# Patient Record
Sex: FEMALE | Race: WHITE | NOT HISPANIC OR LATINO | Employment: OTHER | URBAN - METROPOLITAN AREA
[De-identification: names, ages, dates, MRNs, and addresses within clinical notes are randomized per-mention and may not be internally consistent; named-entity substitution may affect disease eponyms.]

---

## 2017-07-17 ENCOUNTER — APPOINTMENT (EMERGENCY)
Dept: CT IMAGING | Facility: HOSPITAL | Age: 62
End: 2017-07-17
Payer: COMMERCIAL

## 2017-07-17 ENCOUNTER — APPOINTMENT (EMERGENCY)
Dept: RADIOLOGY | Facility: HOSPITAL | Age: 62
End: 2017-07-17
Payer: COMMERCIAL

## 2017-07-17 ENCOUNTER — HOSPITAL ENCOUNTER (EMERGENCY)
Facility: HOSPITAL | Age: 62
Discharge: HOME | End: 2017-07-18
Attending: EMERGENCY MEDICINE | Admitting: EMERGENCY MEDICINE
Payer: COMMERCIAL

## 2017-07-17 DIAGNOSIS — M62.830 MUSCLE SPASM OF BACK: ICD-10-CM
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
## 2017-07-17 LAB
ANION GAP SERPL CALCULATED.3IONS-SCNC: 6 MMOL/L (ref 4–13)
BUN SERPL-MCNC: 11 MG/DL (ref 5–25)
CALCIUM SERPL-MCNC: 10.2 MG/DL (ref 8.3–10.1)
CHLORIDE SERPL-SCNC: 100 MMOL/L (ref 100–108)
CO2 SERPL-SCNC: 28 MMOL/L (ref 21–32)
CREAT SERPL-MCNC: 0.57 MG/DL (ref 0.6–1.3)
GFR SERPL CREATININE-BSD FRML MDRD: >60 ML/MIN/1.73SQ M
GLUCOSE SERPL-MCNC: 109 MG/DL (ref 65–140)
POTASSIUM SERPL-SCNC: 3.4 MMOL/L (ref 3.5–5.3)
SODIUM SERPL-SCNC: 134 MMOL/L (ref 136–145)

## 2017-07-17 PROCEDURE — 72072 X-RAY EXAM THORAC SPINE 3VWS: CPT

## 2017-07-17 PROCEDURE — 70496 CT ANGIOGRAPHY HEAD: CPT

## 2017-07-17 PROCEDURE — 96375 TX/PRO/DX INJ NEW DRUG ADDON: CPT

## 2017-07-17 PROCEDURE — 36415 COLL VENOUS BLD VENIPUNCTURE: CPT | Performed by: EMERGENCY MEDICINE

## 2017-07-17 PROCEDURE — 80048 BASIC METABOLIC PNL TOTAL CA: CPT | Performed by: EMERGENCY MEDICINE

## 2017-07-17 PROCEDURE — 70498 CT ANGIOGRAPHY NECK: CPT

## 2017-07-17 PROCEDURE — 96374 THER/PROPH/DIAG INJ IV PUSH: CPT

## 2017-07-17 PROCEDURE — 72100 X-RAY EXAM L-S SPINE 2/3 VWS: CPT

## 2017-07-17 RX ORDER — ONDANSETRON 2 MG/ML
4 INJECTION INTRAMUSCULAR; INTRAVENOUS ONCE
Status: COMPLETED | OUTPATIENT
Start: 2017-07-17 | End: 2017-07-17

## 2017-07-17 RX ORDER — MORPHINE SULFATE 4 MG/ML
4 INJECTION, SOLUTION INTRAMUSCULAR; INTRAVENOUS ONCE
Status: COMPLETED | OUTPATIENT
Start: 2017-07-17 | End: 2017-07-17

## 2017-07-17 RX ORDER — GABAPENTIN 100 MG/1
100 CAPSULE ORAL DAILY
COMMUNITY
End: 2018-07-05 | Stop reason: SDUPTHER

## 2017-07-17 RX ORDER — LOSARTAN POTASSIUM 100 MG/1
100 TABLET ORAL DAILY
COMMUNITY
End: 2018-07-05 | Stop reason: SDUPTHER

## 2017-07-17 RX ADMIN — IOHEXOL 85 ML: 350 INJECTION, SOLUTION INTRAVENOUS at 22:28

## 2017-07-17 RX ADMIN — ONDANSETRON 4 MG: 2 INJECTION INTRAMUSCULAR; INTRAVENOUS at 21:45

## 2017-07-17 RX ADMIN — MORPHINE SULFATE 4 MG: 4 INJECTION, SOLUTION INTRAMUSCULAR; INTRAVENOUS at 21:47

## 2017-07-18 VITALS
OXYGEN SATURATION: 99 % | TEMPERATURE: 99 F | BODY MASS INDEX: 27.89 KG/M2 | WEIGHT: 188.27 LBS | HEART RATE: 78 BPM | DIASTOLIC BLOOD PRESSURE: 67 MMHG | HEIGHT: 69 IN | SYSTOLIC BLOOD PRESSURE: 146 MMHG | RESPIRATION RATE: 19 BRPM

## 2017-07-18 PROCEDURE — 99284 EMERGENCY DEPT VISIT MOD MDM: CPT

## 2017-07-18 RX ORDER — DIAZEPAM 5 MG/1
5 TABLET ORAL ONCE
Status: COMPLETED | OUTPATIENT
Start: 2017-07-18 | End: 2017-07-18

## 2017-07-18 RX ORDER — DIAZEPAM 5 MG/1
5 TABLET ORAL EVERY 8 HOURS PRN
Qty: 10 TABLET | Refills: 0 | Status: SHIPPED | OUTPATIENT
Start: 2017-07-18 | End: 2018-07-05

## 2017-07-18 RX ORDER — NAPROXEN 500 MG/1
500 TABLET ORAL 2 TIMES DAILY WITH MEALS
Qty: 15 TABLET | Refills: 0 | Status: SHIPPED | OUTPATIENT
Start: 2017-07-18 | End: 2018-07-05

## 2017-07-18 RX ADMIN — DIAZEPAM 5 MG: 5 TABLET ORAL at 00:54

## 2018-07-05 ENCOUNTER — OFFICE VISIT (OUTPATIENT)
Dept: INTERNAL MEDICINE CLINIC | Facility: CLINIC | Age: 63
End: 2018-07-05
Payer: COMMERCIAL

## 2018-07-05 VITALS
HEIGHT: 65 IN | BODY MASS INDEX: 31.59 KG/M2 | WEIGHT: 189.6 LBS | DIASTOLIC BLOOD PRESSURE: 90 MMHG | SYSTOLIC BLOOD PRESSURE: 150 MMHG | HEART RATE: 80 BPM | RESPIRATION RATE: 12 BRPM

## 2018-07-05 DIAGNOSIS — M79.10 MYALGIA: ICD-10-CM

## 2018-07-05 DIAGNOSIS — R25.2 MUSCLE CRAMPS: ICD-10-CM

## 2018-07-05 DIAGNOSIS — E66.9 OBESITY (BMI 30-39.9): ICD-10-CM

## 2018-07-05 DIAGNOSIS — E78.2 MIXED HYPERLIPIDEMIA: ICD-10-CM

## 2018-07-05 DIAGNOSIS — I10 ESSENTIAL HYPERTENSION: Primary | ICD-10-CM

## 2018-07-05 DIAGNOSIS — R60.0 BILATERAL EDEMA OF LOWER EXTREMITY: ICD-10-CM

## 2018-07-05 PROBLEM — M17.2 BILATERAL POST-TRAUMATIC OSTEOARTHRITIS OF KNEE: Status: ACTIVE | Noted: 2018-07-05

## 2018-07-05 PROCEDURE — 99204 OFFICE O/P NEW MOD 45 MIN: CPT | Performed by: INTERNAL MEDICINE

## 2018-07-05 PROCEDURE — 1036F TOBACCO NON-USER: CPT | Performed by: INTERNAL MEDICINE

## 2018-07-05 PROCEDURE — 3008F BODY MASS INDEX DOCD: CPT | Performed by: INTERNAL MEDICINE

## 2018-07-05 RX ORDER — FUROSEMIDE 20 MG/1
20 TABLET ORAL DAILY
Qty: 90 TABLET | Refills: 3 | Status: SHIPPED | OUTPATIENT
Start: 2018-07-05 | End: 2019-01-17 | Stop reason: SDUPTHER

## 2018-07-05 RX ORDER — FUROSEMIDE 20 MG/1
20 TABLET ORAL DAILY
COMMUNITY
End: 2018-07-05 | Stop reason: SDUPTHER

## 2018-07-05 RX ORDER — LOSARTAN POTASSIUM 100 MG/1
100 TABLET ORAL DAILY
Qty: 90 TABLET | Refills: 3 | Status: SHIPPED | OUTPATIENT
Start: 2018-07-05 | End: 2019-01-17 | Stop reason: SDUPTHER

## 2018-07-05 RX ORDER — GABAPENTIN 100 MG/1
100 CAPSULE ORAL DAILY
Qty: 90 CAPSULE | Refills: 3 | Status: SHIPPED | OUTPATIENT
Start: 2018-07-05 | End: 2019-01-17

## 2018-07-05 RX ORDER — ROSUVASTATIN CALCIUM 20 MG/1
20 TABLET, COATED ORAL DAILY
Qty: 90 TABLET | Refills: 3 | Status: SHIPPED | OUTPATIENT
Start: 2018-07-05 | End: 2019-01-17 | Stop reason: SDUPTHER

## 2018-07-05 RX ORDER — ROSUVASTATIN CALCIUM 20 MG/1
20 TABLET, COATED ORAL DAILY
COMMUNITY
End: 2018-07-05 | Stop reason: SDUPTHER
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
## 2018-07-09 ENCOUNTER — APPOINTMENT (OUTPATIENT)
Dept: LAB | Facility: CLINIC | Age: 63
End: 2018-07-09
Payer: COMMERCIAL

## 2018-07-09 DIAGNOSIS — M79.10 MYALGIA: ICD-10-CM

## 2018-07-09 DIAGNOSIS — E78.2 MIXED HYPERLIPIDEMIA: ICD-10-CM

## 2018-07-09 DIAGNOSIS — I10 ESSENTIAL HYPERTENSION: ICD-10-CM

## 2018-07-09 LAB
25(OH)D3 SERPL-MCNC: 27.4 NG/ML (ref 30–100)
ALBUMIN SERPL BCP-MCNC: 3.7 G/DL (ref 3.5–5)
ALP SERPL-CCNC: 77 U/L (ref 46–116)
ALT SERPL W P-5'-P-CCNC: 31 U/L (ref 12–78)
ANION GAP SERPL CALCULATED.3IONS-SCNC: 6 MMOL/L (ref 4–13)
AST SERPL W P-5'-P-CCNC: 19 U/L (ref 5–45)
BASOPHILS # BLD AUTO: 0.06 THOUSANDS/ΜL (ref 0–0.1)
BASOPHILS NFR BLD AUTO: 1 % (ref 0–1)
BILIRUB SERPL-MCNC: 0.58 MG/DL (ref 0.2–1)
BUN SERPL-MCNC: 18 MG/DL (ref 5–25)
CALCIUM SERPL-MCNC: 9.4 MG/DL (ref 8.3–10.1)
CHLORIDE SERPL-SCNC: 105 MMOL/L (ref 100–108)
CHOLEST SERPL-MCNC: 172 MG/DL (ref 50–200)
CO2 SERPL-SCNC: 27 MMOL/L (ref 21–32)
CREAT SERPL-MCNC: 0.69 MG/DL (ref 0.6–1.3)
EOSINOPHIL # BLD AUTO: 0.19 THOUSAND/ΜL (ref 0–0.61)
EOSINOPHIL NFR BLD AUTO: 4 % (ref 0–6)
ERYTHROCYTE [DISTWIDTH] IN BLOOD BY AUTOMATED COUNT: 12.8 % (ref 11.6–15.1)
GFR SERPL CREATININE-BSD FRML MDRD: 94 ML/MIN/1.73SQ M
GLUCOSE P FAST SERPL-MCNC: 87 MG/DL (ref 65–99)
HCT VFR BLD AUTO: 39 % (ref 34.8–46.1)
HDLC SERPL-MCNC: 52 MG/DL (ref 40–60)
HGB BLD-MCNC: 12.6 G/DL (ref 11.5–15.4)
IMM GRANULOCYTES # BLD AUTO: 0.01 THOUSAND/UL (ref 0–0.2)
IMM GRANULOCYTES NFR BLD AUTO: 0 % (ref 0–2)
LDLC SERPL CALC-MCNC: 94 MG/DL (ref 0–100)
LYMPHOCYTES # BLD AUTO: 1.58 THOUSANDS/ΜL (ref 0.6–4.47)
LYMPHOCYTES NFR BLD AUTO: 32 % (ref 14–44)
MCH RBC QN AUTO: 28.8 PG (ref 26.8–34.3)
MCHC RBC AUTO-ENTMCNC: 32.3 G/DL (ref 31.4–37.4)
MCV RBC AUTO: 89 FL (ref 82–98)
MONOCYTES # BLD AUTO: 0.52 THOUSAND/ΜL (ref 0.17–1.22)
MONOCYTES NFR BLD AUTO: 11 % (ref 4–12)
NEUTROPHILS # BLD AUTO: 2.6 THOUSANDS/ΜL (ref 1.85–7.62)
NEUTS SEG NFR BLD AUTO: 52 % (ref 43–75)
NONHDLC SERPL-MCNC: 120 MG/DL
NRBC BLD AUTO-RTO: 0 /100 WBCS
PLATELET # BLD AUTO: 262 THOUSANDS/UL (ref 149–390)
PMV BLD AUTO: 10.8 FL (ref 8.9–12.7)
POTASSIUM SERPL-SCNC: 4 MMOL/L (ref 3.5–5.3)
PROT SERPL-MCNC: 7.4 G/DL (ref 6.4–8.2)
RBC # BLD AUTO: 4.37 MILLION/UL (ref 3.81–5.12)
SODIUM SERPL-SCNC: 138 MMOL/L (ref 136–145)
TRIGL SERPL-MCNC: 131 MG/DL
TSH SERPL DL<=0.05 MIU/L-ACNC: 2.25 UIU/ML (ref 0.36–3.74)
WBC # BLD AUTO: 4.96 THOUSAND/UL (ref 4.31–10.16)

## 2018-07-09 PROCEDURE — 36415 COLL VENOUS BLD VENIPUNCTURE: CPT

## 2018-07-09 PROCEDURE — 84443 ASSAY THYROID STIM HORMONE: CPT

## 2018-07-09 PROCEDURE — 80061 LIPID PANEL: CPT

## 2018-07-09 PROCEDURE — 82306 VITAMIN D 25 HYDROXY: CPT

## 2018-07-09 PROCEDURE — 80053 COMPREHEN METABOLIC PANEL: CPT

## 2018-07-09 PROCEDURE — 85025 COMPLETE CBC W/AUTO DIFF WBC: CPT

## 2018-07-23 ENCOUNTER — TELEPHONE (OUTPATIENT)
Dept: INTERNAL MEDICINE CLINIC | Facility: CLINIC | Age: 63
End: 2018-07-23

## 2018-07-23 NOTE — TELEPHONE ENCOUNTER
Patient calling about her lab work done on Monday July 9- the results- results in chart    Informed her that Dr Nasreen Clark was out of the office for 2 wks      Please call with results      P#: [phone]

## 2018-11-29 ENCOUNTER — TELEPHONE (OUTPATIENT)
Dept: INTERNAL MEDICINE CLINIC | Facility: CLINIC | Age: 63
End: 2018-11-29

## 2019-01-04 ENCOUNTER — APPOINTMENT (OUTPATIENT)
Dept: LAB | Facility: CLINIC | Age: 64
End: 2019-01-04
Payer: COMMERCIAL

## 2019-01-04 DIAGNOSIS — E78.2 MIXED HYPERLIPIDEMIA: ICD-10-CM

## 2019-01-04 DIAGNOSIS — I10 ESSENTIAL HYPERTENSION: ICD-10-CM

## 2019-01-04 LAB
ALBUMIN SERPL BCP-MCNC: 3.4 G/DL (ref 3.5–5)
ALP SERPL-CCNC: 73 U/L (ref 46–116)
ALT SERPL W P-5'-P-CCNC: 35 U/L (ref 12–78)
ANION GAP SERPL CALCULATED.3IONS-SCNC: 5 MMOL/L (ref 4–13)
AST SERPL W P-5'-P-CCNC: 19 U/L (ref 5–45)
BASOPHILS # BLD AUTO: 0.06 THOUSANDS/ΜL (ref 0–0.1)
BASOPHILS NFR BLD AUTO: 1 % (ref 0–1)
BILIRUB SERPL-MCNC: 0.4 MG/DL (ref 0.2–1)
BUN SERPL-MCNC: 16 MG/DL (ref 5–25)
CALCIUM SERPL-MCNC: 9.9 MG/DL (ref 8.3–10.1)
CHLORIDE SERPL-SCNC: 107 MMOL/L (ref 100–108)
CHOLEST SERPL-MCNC: 155 MG/DL (ref 50–200)
CO2 SERPL-SCNC: 26 MMOL/L (ref 21–32)
CREAT SERPL-MCNC: 0.67 MG/DL (ref 0.6–1.3)
EOSINOPHIL # BLD AUTO: 0.15 THOUSAND/ΜL (ref 0–0.61)
EOSINOPHIL NFR BLD AUTO: 3 % (ref 0–6)
ERYTHROCYTE [DISTWIDTH] IN BLOOD BY AUTOMATED COUNT: 12.9 % (ref 11.6–15.1)
GFR SERPL CREATININE-BSD FRML MDRD: 95 ML/MIN/1.73SQ M
GLUCOSE P FAST SERPL-MCNC: 88 MG/DL (ref 65–99)
HCT VFR BLD AUTO: 40.2 % (ref 34.8–46.1)
HDLC SERPL-MCNC: 57 MG/DL (ref 40–60)
HGB BLD-MCNC: 13.3 G/DL (ref 11.5–15.4)
IMM GRANULOCYTES # BLD AUTO: 0.01 THOUSAND/UL (ref 0–0.2)
IMM GRANULOCYTES NFR BLD AUTO: 0 % (ref 0–2)
LDLC SERPL CALC-MCNC: 69 MG/DL (ref 0–100)
LYMPHOCYTES # BLD AUTO: 1.31 THOUSANDS/ΜL (ref 0.6–4.47)
LYMPHOCYTES NFR BLD AUTO: 28 % (ref 14–44)
MCH RBC QN AUTO: 29.3 PG (ref 26.8–34.3)
MCHC RBC AUTO-ENTMCNC: 33.1 G/DL (ref 31.4–37.4)
MCV RBC AUTO: 89 FL (ref 82–98)
MONOCYTES # BLD AUTO: 0.48 THOUSAND/ΜL (ref 0.17–1.22)
MONOCYTES NFR BLD AUTO: 10 % (ref 4–12)
NEUTROPHILS # BLD AUTO: 2.75 THOUSANDS/ΜL (ref 1.85–7.62)
NEUTS SEG NFR BLD AUTO: 58 % (ref 43–75)
NONHDLC SERPL-MCNC: 98 MG/DL
NRBC BLD AUTO-RTO: 0 /100 WBCS
PLATELET # BLD AUTO: 253 THOUSANDS/UL (ref 149–390)
PMV BLD AUTO: 11.5 FL (ref 8.9–12.7)
POTASSIUM SERPL-SCNC: 4 MMOL/L (ref 3.5–5.3)
PROT SERPL-MCNC: 7.3 G/DL (ref 6.4–8.2)
RBC # BLD AUTO: 4.54 MILLION/UL (ref 3.81–5.12)
SODIUM SERPL-SCNC: 138 MMOL/L (ref 136–145)
TRIGL SERPL-MCNC: 145 MG/DL
TSH SERPL DL<=0.05 MIU/L-ACNC: 1.87 UIU/ML (ref 0.36–3.74)
WBC # BLD AUTO: 4.76 THOUSAND/UL (ref 4.31–10.16)

## 2019-01-04 PROCEDURE — 80061 LIPID PANEL: CPT

## 2019-01-04 PROCEDURE — 85025 COMPLETE CBC W/AUTO DIFF WBC: CPT

## 2019-01-04 PROCEDURE — 36415 COLL VENOUS BLD VENIPUNCTURE: CPT

## 2019-01-04 PROCEDURE — 84443 ASSAY THYROID STIM HORMONE: CPT

## 2019-01-04 PROCEDURE — 80053 COMPREHEN METABOLIC PANEL: CPT

## 2019-01-17 ENCOUNTER — OFFICE VISIT (OUTPATIENT)
Dept: INTERNAL MEDICINE CLINIC | Facility: CLINIC | Age: 64
End: 2019-01-17
Payer: COMMERCIAL

## 2019-01-17 VITALS
SYSTOLIC BLOOD PRESSURE: 160 MMHG | HEART RATE: 66 BPM | OXYGEN SATURATION: 96 % | DIASTOLIC BLOOD PRESSURE: 96 MMHG | HEIGHT: 66 IN | WEIGHT: 195.4 LBS | BODY MASS INDEX: 31.4 KG/M2

## 2019-01-17 DIAGNOSIS — G60.9 IDIOPATHIC PERIPHERAL NEUROPATHY: ICD-10-CM

## 2019-01-17 DIAGNOSIS — Z00.00 HEALTHCARE MAINTENANCE: ICD-10-CM

## 2019-01-17 DIAGNOSIS — E66.9 OBESITY (BMI 30-39.9): ICD-10-CM

## 2019-01-17 DIAGNOSIS — Z12.39 SCREENING FOR MALIGNANT NEOPLASM OF BREAST: ICD-10-CM

## 2019-01-17 DIAGNOSIS — J01.01 ACUTE RECURRENT MAXILLARY SINUSITIS: ICD-10-CM

## 2019-01-17 DIAGNOSIS — Z00.00 MEDICARE ANNUAL WELLNESS VISIT, SUBSEQUENT: ICD-10-CM

## 2019-01-17 DIAGNOSIS — I10 ESSENTIAL HYPERTENSION: Primary | ICD-10-CM

## 2019-01-17 DIAGNOSIS — Z12.11 SCREEN FOR COLON CANCER: ICD-10-CM

## 2019-01-17 DIAGNOSIS — E78.2 MIXED HYPERLIPIDEMIA: ICD-10-CM

## 2019-01-17 DIAGNOSIS — R60.0 BILATERAL EDEMA OF LOWER EXTREMITY: ICD-10-CM

## 2019-01-17 PROCEDURE — 1036F TOBACCO NON-USER: CPT | Performed by: INTERNAL MEDICINE

## 2019-01-17 PROCEDURE — 3008F BODY MASS INDEX DOCD: CPT | Performed by: INTERNAL MEDICINE

## 2019-01-17 PROCEDURE — 99214 OFFICE O/P EST MOD 30 MIN: CPT | Performed by: INTERNAL MEDICINE

## 2019-01-17 RX ORDER — FUROSEMIDE 20 MG/1
20 TABLET ORAL DAILY
Qty: 90 TABLET | Refills: 1 | Status: SHIPPED | OUTPATIENT
Start: 2019-01-17 | End: 2019-07-03 | Stop reason: SDUPTHER

## 2019-01-17 RX ORDER — AMOXICILLIN 875 MG/1
875 TABLET, COATED ORAL 2 TIMES DAILY
Qty: 14 TABLET | Refills: 0 | Status: SHIPPED | OUTPATIENT
Start: 2019-01-17 | End: 2019-01-24

## 2019-01-17 RX ORDER — LOSARTAN POTASSIUM 100 MG/1
100 TABLET ORAL DAILY
Qty: 90 TABLET | Refills: 1 | Status: SHIPPED | OUTPATIENT
Start: 2019-01-17 | End: 2019-07-03 | Stop reason: SDUPTHER

## 2019-01-17 RX ORDER — ROSUVASTATIN CALCIUM 20 MG/1
20 TABLET, COATED ORAL DAILY
Qty: 90 TABLET | Refills: 1 | Status: SHIPPED | OUTPATIENT
Start: 2019-01-17 | End: 2019-07-03 | Stop reason: SDUPTHER

## 2019-01-17 RX ORDER — GABAPENTIN 600 MG/1
600 TABLET ORAL 2 TIMES DAILY
Qty: 180 TABLET | Refills: 1 | Status: SHIPPED | OUTPATIENT
Start: 2019-01-17 | End: 2020-03-19
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
## 2019-03-20 DIAGNOSIS — Z12.39 SCREENING FOR MALIGNANT NEOPLASM OF BREAST: ICD-10-CM

## 2019-05-14 ENCOUNTER — CONSULT (OUTPATIENT)
Dept: INTERNAL MEDICINE CLINIC | Facility: CLINIC | Age: 64
End: 2019-05-14
Payer: COMMERCIAL

## 2019-05-14 VITALS
HEIGHT: 66 IN | BODY MASS INDEX: 30.86 KG/M2 | DIASTOLIC BLOOD PRESSURE: 94 MMHG | SYSTOLIC BLOOD PRESSURE: 140 MMHG | OXYGEN SATURATION: 97 % | HEART RATE: 66 BPM | WEIGHT: 192 LBS

## 2019-05-14 DIAGNOSIS — Z01.818 PRE-OP EXAMINATION: Primary | ICD-10-CM

## 2019-05-14 DIAGNOSIS — E78.2 MIXED HYPERLIPIDEMIA: ICD-10-CM

## 2019-05-14 DIAGNOSIS — G60.9 IDIOPATHIC PERIPHERAL NEUROPATHY: ICD-10-CM

## 2019-05-14 DIAGNOSIS — I10 ESSENTIAL HYPERTENSION: ICD-10-CM

## 2019-05-14 DIAGNOSIS — H25.9 AGE-RELATED CATARACT OF BOTH EYES, UNSPECIFIED AGE-RELATED CATARACT TYPE: ICD-10-CM

## 2019-05-14 PROCEDURE — 99214 OFFICE O/P EST MOD 30 MIN: CPT | Performed by: INTERNAL MEDICINE

## 2019-06-19 ENCOUNTER — TELEPHONE (OUTPATIENT)
Dept: INTERNAL MEDICINE CLINIC | Facility: CLINIC | Age: 64
End: 2019-06-19

## 2019-06-20 ENCOUNTER — TELEPHONE (OUTPATIENT)
Dept: INTERNAL MEDICINE CLINIC | Facility: CLINIC | Age: 64
End: 2019-06-20

## 2019-06-21 DIAGNOSIS — R19.5 POSITIVE COLORECTAL CANCER SCREENING USING COLOGUARD TEST: Primary | ICD-10-CM

## 2019-06-24 ENCOUNTER — OFFICE VISIT (OUTPATIENT)
Dept: OBGYN CLINIC | Facility: CLINIC | Age: 64
End: 2019-06-24
Payer: COMMERCIAL

## 2019-06-24 VITALS
DIASTOLIC BLOOD PRESSURE: 92 MMHG | BODY MASS INDEX: 30.76 KG/M2 | HEIGHT: 66 IN | WEIGHT: 191.38 LBS | SYSTOLIC BLOOD PRESSURE: 140 MMHG

## 2019-06-24 DIAGNOSIS — Z01.419 ENCOUNTER FOR GYNECOLOGICAL EXAMINATION WITHOUT ABNORMAL FINDING: Primary | ICD-10-CM

## 2019-06-24 DIAGNOSIS — Z12.31 ENCOUNTER FOR SCREENING MAMMOGRAM FOR MALIGNANT NEOPLASM OF BREAST: ICD-10-CM

## 2019-06-24 PROCEDURE — G0145 SCR C/V CYTO,THINLAYER,RESCR: HCPCS | Performed by: OBSTETRICS & GYNECOLOGY

## 2019-06-24 PROCEDURE — S0610 ANNUAL GYNECOLOGICAL EXAMINA: HCPCS | Performed by: OBSTETRICS & GYNECOLOGY

## 2019-06-24 PROCEDURE — 87624 HPV HI-RISK TYP POOLED RSLT: CPT | Performed by: OBSTETRICS & GYNECOLOGY

## 2019-06-25 ENCOUNTER — APPOINTMENT (OUTPATIENT)
Dept: LAB | Facility: CLINIC | Age: 64
End: 2019-06-25
Payer: COMMERCIAL

## 2019-06-25 DIAGNOSIS — I10 ESSENTIAL HYPERTENSION: ICD-10-CM

## 2019-06-25 DIAGNOSIS — E78.2 MIXED HYPERLIPIDEMIA: ICD-10-CM

## 2019-06-25 LAB
ALBUMIN SERPL BCP-MCNC: 3.8 G/DL (ref 3.5–5)
ALP SERPL-CCNC: 74 U/L (ref 46–116)
ALT SERPL W P-5'-P-CCNC: 31 U/L (ref 12–78)
ANION GAP SERPL CALCULATED.3IONS-SCNC: 5 MMOL/L (ref 4–13)
AST SERPL W P-5'-P-CCNC: 21 U/L (ref 5–45)
BASOPHILS # BLD AUTO: 0.05 THOUSANDS/ΜL (ref 0–0.1)
BASOPHILS NFR BLD AUTO: 1 % (ref 0–1)
BILIRUB SERPL-MCNC: 0.72 MG/DL (ref 0.2–1)
BUN SERPL-MCNC: 23 MG/DL (ref 5–25)
CALCIUM ALBUM COR SERPL-MCNC: 10.5 MG/DL (ref 8.3–10.1)
CALCIUM SERPL-MCNC: 10.3 MG/DL (ref 8.3–10.1)
CHLORIDE SERPL-SCNC: 108 MMOL/L (ref 100–108)
CHOLEST SERPL-MCNC: 245 MG/DL (ref 50–200)
CO2 SERPL-SCNC: 26 MMOL/L (ref 21–32)
CREAT SERPL-MCNC: 0.74 MG/DL (ref 0.6–1.3)
EOSINOPHIL # BLD AUTO: 0.24 THOUSAND/ΜL (ref 0–0.61)
EOSINOPHIL NFR BLD AUTO: 5 % (ref 0–6)
ERYTHROCYTE [DISTWIDTH] IN BLOOD BY AUTOMATED COUNT: 12.9 % (ref 11.6–15.1)
GFR SERPL CREATININE-BSD FRML MDRD: 86 ML/MIN/1.73SQ M
GLUCOSE P FAST SERPL-MCNC: 82 MG/DL (ref 65–99)
HCT VFR BLD AUTO: 40.8 % (ref 34.8–46.1)
HDLC SERPL-MCNC: 57 MG/DL (ref 40–60)
HGB BLD-MCNC: 13.3 G/DL (ref 11.5–15.4)
IMM GRANULOCYTES # BLD AUTO: 0.01 THOUSAND/UL (ref 0–0.2)
IMM GRANULOCYTES NFR BLD AUTO: 0 % (ref 0–2)
LDLC SERPL CALC-MCNC: 165 MG/DL (ref 0–100)
LYMPHOCYTES # BLD AUTO: 1.92 THOUSANDS/ΜL (ref 0.6–4.47)
LYMPHOCYTES NFR BLD AUTO: 36 % (ref 14–44)
MCH RBC QN AUTO: 29.8 PG (ref 26.8–34.3)
MCHC RBC AUTO-ENTMCNC: 32.6 G/DL (ref 31.4–37.4)
MCV RBC AUTO: 92 FL (ref 82–98)
MONOCYTES # BLD AUTO: 0.56 THOUSAND/ΜL (ref 0.17–1.22)
MONOCYTES NFR BLD AUTO: 11 % (ref 4–12)
NEUTROPHILS # BLD AUTO: 2.56 THOUSANDS/ΜL (ref 1.85–7.62)
NEUTS SEG NFR BLD AUTO: 47 % (ref 43–75)
NONHDLC SERPL-MCNC: 188 MG/DL
NRBC BLD AUTO-RTO: 0 /100 WBCS
PLATELET # BLD AUTO: 289 THOUSANDS/UL (ref 149–390)
PMV BLD AUTO: 10.5 FL (ref 8.9–12.7)
POTASSIUM SERPL-SCNC: 4.2 MMOL/L (ref 3.5–5.3)
PROT SERPL-MCNC: 7.5 G/DL (ref 6.4–8.2)
RBC # BLD AUTO: 4.46 MILLION/UL (ref 3.81–5.12)
SODIUM SERPL-SCNC: 139 MMOL/L (ref 136–145)
TRIGL SERPL-MCNC: 117 MG/DL
TSH SERPL DL<=0.05 MIU/L-ACNC: 2.23 UIU/ML (ref 0.36–3.74)
WBC # BLD AUTO: 5.34 THOUSAND/UL (ref 4.31–10.16)

## 2019-06-25 PROCEDURE — 80053 COMPREHEN METABOLIC PANEL: CPT

## 2019-06-25 PROCEDURE — 84443 ASSAY THYROID STIM HORMONE: CPT

## 2019-06-25 PROCEDURE — 80061 LIPID PANEL: CPT

## 2019-06-25 PROCEDURE — 36415 COLL VENOUS BLD VENIPUNCTURE: CPT

## 2019-06-25 PROCEDURE — 85025 COMPLETE CBC W/AUTO DIFF WBC: CPT

## 2019-06-27 LAB
HPV HR 12 DNA CVX QL NAA+PROBE: NEGATIVE
HPV16 DNA CVX QL NAA+PROBE: NEGATIVE
HPV18 DNA CVX QL NAA+PROBE: NEGATIVE
LAB AP GYN PRIMARY INTERPRETATION: NORMAL
Lab: NORMAL

## 2019-06-28 ENCOUNTER — TELEPHONE (OUTPATIENT)
Dept: OBGYN CLINIC | Facility: CLINIC | Age: 64
End: 2019-06-28

## 2019-07-03 ENCOUNTER — OFFICE VISIT (OUTPATIENT)
Dept: INTERNAL MEDICINE CLINIC | Facility: CLINIC | Age: 64
End: 2019-07-03
Payer: COMMERCIAL

## 2019-07-03 VITALS
SYSTOLIC BLOOD PRESSURE: 130 MMHG | WEIGHT: 189.6 LBS | BODY MASS INDEX: 30.47 KG/M2 | OXYGEN SATURATION: 96 % | HEART RATE: 74 BPM | HEIGHT: 66 IN | DIASTOLIC BLOOD PRESSURE: 94 MMHG

## 2019-07-03 DIAGNOSIS — G60.9 IDIOPATHIC PERIPHERAL NEUROPATHY: ICD-10-CM

## 2019-07-03 DIAGNOSIS — I10 ESSENTIAL HYPERTENSION: Primary | ICD-10-CM

## 2019-07-03 DIAGNOSIS — R60.0 BILATERAL EDEMA OF LOWER EXTREMITY: ICD-10-CM

## 2019-07-03 DIAGNOSIS — E78.2 MIXED HYPERLIPIDEMIA: ICD-10-CM

## 2019-07-03 DIAGNOSIS — R19.5 POSITIVE COLORECTAL CANCER SCREENING USING COLOGUARD TEST: ICD-10-CM

## 2019-07-03 DIAGNOSIS — Z11.59 NEED FOR HEPATITIS C SCREENING TEST: ICD-10-CM

## 2019-07-03 PROCEDURE — 99214 OFFICE O/P EST MOD 30 MIN: CPT | Performed by: INTERNAL MEDICINE

## 2019-07-03 RX ORDER — FUROSEMIDE 20 MG/1
20 TABLET ORAL DAILY
Qty: 90 TABLET | Refills: 1 | Status: SHIPPED | OUTPATIENT
Start: 2019-07-03 | End: 2020-06-19 | Stop reason: SDUPTHER

## 2019-07-03 RX ORDER — ROSUVASTATIN CALCIUM 20 MG/1
20 TABLET, COATED ORAL DAILY
Qty: 90 TABLET | Refills: 1 | Status: SHIPPED | OUTPATIENT
Start: 2019-07-03 | End: 2020-06-19 | Stop reason: SDUPTHER

## 2019-07-03 RX ORDER — LOSARTAN POTASSIUM 100 MG/1
100 TABLET ORAL DAILY
Qty: 90 TABLET | Refills: 1 | Status: SHIPPED | OUTPATIENT
Start: 2019-07-03 | End: 2019-12-30 | Stop reason: SDUPTHER
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
## 2019-07-08 ENCOUNTER — OFFICE VISIT (OUTPATIENT)
Dept: INTERNAL MEDICINE CLINIC | Facility: CLINIC | Age: 64
End: 2019-07-08
Payer: COMMERCIAL

## 2019-07-08 VITALS
BODY MASS INDEX: 30.7 KG/M2 | HEIGHT: 66 IN | OXYGEN SATURATION: 98 % | TEMPERATURE: 98.1 F | HEART RATE: 67 BPM | SYSTOLIC BLOOD PRESSURE: 142 MMHG | DIASTOLIC BLOOD PRESSURE: 80 MMHG | WEIGHT: 191 LBS

## 2019-07-08 DIAGNOSIS — J06.9 UPPER RESPIRATORY TRACT INFECTION, UNSPECIFIED TYPE: Primary | ICD-10-CM

## 2019-07-08 PROCEDURE — 99213 OFFICE O/P EST LOW 20 MIN: CPT | Performed by: INTERNAL MEDICINE

## 2019-07-08 PROCEDURE — 3008F BODY MASS INDEX DOCD: CPT | Performed by: INTERNAL MEDICINE

## 2019-07-08 RX ORDER — AMOXICILLIN 875 MG/1
875 TABLET, COATED ORAL 2 TIMES DAILY
Qty: 14 TABLET | Refills: 0 | Status: SHIPPED | OUTPATIENT
Start: 2019-07-08 | End: 2019-07-15

## 2019-07-08 RX ORDER — FLUTICASONE PROPIONATE 50 MCG
2 SPRAY, SUSPENSION (ML) NASAL DAILY
Qty: 16 G | Refills: 3 | Status: SHIPPED | OUTPATIENT
Start: 2019-07-08 | End: 2020-01-13

## 2019-07-08 NOTE — PROGRESS NOTES
INTERNAL MEDICINE FOLLOW-UP OFFICE VISIT  [facility]    NAME: David Shabazz  AGE: 62 y o  SEX: female  : 1955   MRN: [id number]    DATE: 2019  TIME: 11:56 AM    Assessment and Plan     Diagnoses and all orders for this visit:    Upper respiratory tract infection, unspecified type  -     amoxicillin (AMOXIL) 875 mg tablet; Take 1 tablet (875 mg total) by mouth 2 (two) times a day for 7 days  -     fluticasone (FLONASE) 50 mcg/act nasal spray; 2 sprays into each nostril daily        - Counseling Documentation: patient was counseled regarding: instructions for management, risk factor reductions, prognosis, patient and family education, impressions, risks and benefits of treatment options and importance of compliance with treatment  - Medication Side Effects: Adverse side effects of medications were reviewed with the patient/guardian today  Return to office in: as needed    Chief Complaint     Chief Complaint   Patient presents with    Cold Like Symptoms     since saturday  History of Present Illness     URI    This is a new problem  The current episode started in the past 7 days  The problem has been gradually worsening  There has been no fever  Associated symptoms include congestion, a plugged ear sensation, rhinorrhea, sneezing and a sore throat  Pertinent negatives include no abdominal pain, chest pain, coughing, diarrhea, dysuria, ear pain, headaches, nausea, neck pain, rash, sinus pain, vomiting or wheezing  She has tried nothing for the symptoms  The following portions of the patient's history were reviewed and updated as appropriate: allergies, current medications, past family history, past medical history, past social history, past surgical history and problem list     Review of Systems     Review of Systems   Constitutional: Negative for chills, diaphoresis, fatigue and fever     HENT: Positive for congestion, rhinorrhea, sneezing and sore throat  Negative for ear discharge, ear pain, hearing loss, postnasal drip, sinus pressure, sinus pain and voice change  Eyes: Negative for pain, discharge, redness and visual disturbance  Respiratory: Negative for cough, chest tightness, shortness of breath and wheezing  Cardiovascular: Negative for chest pain, palpitations and leg swelling  Gastrointestinal: Negative for abdominal distention, abdominal pain, blood in stool, constipation, diarrhea, nausea and vomiting  Endocrine: Negative for cold intolerance, heat intolerance, polydipsia, polyphagia and polyuria  Genitourinary: Negative for dysuria, flank pain, frequency, hematuria and urgency  Musculoskeletal: Negative for arthralgias, back pain, gait problem, joint swelling, myalgias, neck pain and neck stiffness  Skin: Negative for rash  Neurological: Negative for dizziness, tremors, syncope, facial asymmetry, speech difficulty, weakness, light-headedness, numbness and headaches  Hematological: Does not bruise/bleed easily  Psychiatric/Behavioral: Negative for behavioral problems, confusion and sleep disturbance  The patient is not nervous/anxious  Active Problem List     Patient Active Problem List   Diagnosis    Essential hypertension    Mixed hyperlipidemia    Bilateral edema of lower extremity    Muscle cramps    Obesity (BMI 30-39  9)    Bilateral post-traumatic osteoarthritis of knee    Idiopathic peripheral neuropathy    Encounter for gynecological examination without abnormal finding       Objective     /80 (BP Location: Left arm, Patient Position: Sitting, Cuff Size: Standard)   Pulse 67   Temp 98 1 °F (36 7 °C) (Tympanic)   Ht 5' 5 5" (1 664 m)   Wt 86 6 kg (191 lb)   LMP  (LMP Unknown)   SpO2 98%   BMI 31 30 kg/m²     Physical Exam   Constitutional: She is oriented to person, place, and time  She appears well-developed and well-nourished  No distress  HENT:   Head: Normocephalic and atraumatic  Right Ear: External ear normal    Left Ear: External ear normal    Nose: Nose normal     Throat congested   Eyes: Conjunctivae and EOM are normal  Right eye exhibits no discharge  Left eye exhibits no discharge  No scleral icterus  Neck: Normal range of motion  Neck supple  No JVD present  No tracheal deviation present  No thyromegaly present  Cardiovascular: Normal rate, regular rhythm, normal heart sounds and intact distal pulses  Exam reveals no gallop and no friction rub  No murmur heard  Pulmonary/Chest: Effort normal and breath sounds normal  No respiratory distress  She has no wheezes  She has no rales  She exhibits no tenderness  Abdominal: Soft  Bowel sounds are normal  She exhibits no distension  There is no tenderness  There is no rebound and no guarding  Musculoskeletal: Normal range of motion  She exhibits no edema or tenderness  Lymphadenopathy:     She has no cervical adenopathy  Neurological: She is alert and oriented to person, place, and time  No cranial nerve deficit  She exhibits normal muscle tone  Coordination normal    Skin: Skin is warm and dry  No rash noted  She is not diaphoretic  No erythema  Psychiatric: She has a normal mood and affect   Judgment normal            Current Medications       Current Outpatient Medications:     B Complex Vitamins (B COMPLEX 1 PO), Take by mouth, Disp: , Rfl:     furosemide (LASIX) 20 mg tablet, Take 1 tablet (20 mg total) by mouth daily, Disp: 90 tablet, Rfl: 1    gabapentin (NEURONTIN) 600 MG tablet, Take 1 tablet (600 mg total) by mouth 2 (two) times a day, Disp: 180 tablet, Rfl: 1    losartan (COZAAR) 100 MG tablet, Take 1 tablet (100 mg total) by mouth daily, Disp: 90 tablet, Rfl: 1    rosuvastatin (CRESTOR) 20 MG tablet, Take 1 tablet (20 mg total) by mouth daily, Disp: 90 tablet, Rfl: 1    amoxicillin (AMOXIL) 875 mg tablet, Take 1 tablet (875 mg total) by mouth 2 (two) times a day for 7 days, Disp: 14 tablet, Rfl: 0   fluticasone (FLONASE) 50 mcg/act nasal spray, 2 sprays into each nostril daily, Disp: 16 g, Rfl: 3    Health Maintenance     Health Maintenance   Topic Date Due    Hepatitis C Screening  1955    CRC Screening: Colonoscopy  1955    DTaP,Tdap,and Td Vaccines (1 - Tdap) 02/24/1977    INFLUENZA VACCINE  07/01/2019    DXA SCAN  01/10/2020    Depression Screening PHQ  01/17/2020    BMI: Followup Plan  01/17/2020    MAMMOGRAM  03/19/2020    BMI: Adult  07/03/2020    Pneumococcal Vaccine: 65+ Years (1 of 2 - PCV13) 02/24/2021    CRC Screening: Cologuard  06/10/2022    PAP SMEAR  06/24/2022    Pneumococcal Vaccine: Pediatrics (0 to 5 Years) and At-Risk Patients (6 to 59 Years)  Aged Out    HEPATITIS B VACCINES  Aged Out       There is no immunization history on file for this patient        Jonathan Landon MD  [address] [facility] [place]

## 2019-08-02 ENCOUNTER — OFFICE VISIT (OUTPATIENT)
Dept: GASTROENTEROLOGY | Facility: CLINIC | Age: 64
End: 2019-08-02
Payer: COMMERCIAL

## 2019-08-02 VITALS
BODY MASS INDEX: 31.63 KG/M2 | DIASTOLIC BLOOD PRESSURE: 90 MMHG | WEIGHT: 193 LBS | HEART RATE: 77 BPM | SYSTOLIC BLOOD PRESSURE: 162 MMHG

## 2019-08-02 DIAGNOSIS — R19.5 POSITIVE COLORECTAL CANCER SCREENING USING COLOGUARD TEST: Primary | ICD-10-CM

## 2019-08-02 DIAGNOSIS — K62.5 RECTAL BLEEDING: ICD-10-CM

## 2019-08-02 PROCEDURE — 99204 OFFICE O/P NEW MOD 45 MIN: CPT | Performed by: INTERNAL MEDICINE
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
## 2019-08-04 ENCOUNTER — ANESTHESIA EVENT (OUTPATIENT)
Dept: GASTROENTEROLOGY | Facility: HOSPITAL | Age: 64
End: 2019-08-04

## 2019-08-05 ENCOUNTER — HOSPITAL ENCOUNTER (OUTPATIENT)
Dept: GASTROENTEROLOGY | Facility: HOSPITAL | Age: 64
Setting detail: OUTPATIENT SURGERY
Discharge: HOME | End: 2019-08-05
Attending: INTERNAL MEDICINE | Admitting: INTERNAL MEDICINE
Payer: COMMERCIAL

## 2019-08-05 ENCOUNTER — ANESTHESIA (OUTPATIENT)
Dept: GASTROENTEROLOGY | Facility: HOSPITAL | Age: 64
End: 2019-08-05

## 2019-08-05 VITALS
HEART RATE: 53 BPM | WEIGHT: 184.75 LBS | RESPIRATION RATE: 16 BRPM | DIASTOLIC BLOOD PRESSURE: 57 MMHG | TEMPERATURE: 97.9 F | SYSTOLIC BLOOD PRESSURE: 112 MMHG | HEIGHT: 67 IN | OXYGEN SATURATION: 96 % | BODY MASS INDEX: 29 KG/M2

## 2019-08-05 DIAGNOSIS — R19.5 POSITIVE COLORECTAL CANCER SCREENING USING COLOGUARD TEST: ICD-10-CM

## 2019-08-05 DIAGNOSIS — K62.5 RECTAL BLEEDING: ICD-10-CM

## 2019-08-05 PROCEDURE — 88342 IMHCHEM/IMCYTCHM 1ST ANTB: CPT | Performed by: PATHOLOGY

## 2019-08-05 PROCEDURE — 88305 TISSUE EXAM BY PATHOLOGIST: CPT | Performed by: PATHOLOGY

## 2019-08-05 PROCEDURE — 45384 COLONOSCOPY W/LESION REMOVAL: CPT | Performed by: INTERNAL MEDICINE

## 2019-08-05 PROCEDURE — 88341 IMHCHEM/IMCYTCHM EA ADD ANTB: CPT | Performed by: PATHOLOGY

## 2019-08-05 PROCEDURE — 45385 COLONOSCOPY W/LESION REMOVAL: CPT | Performed by: INTERNAL MEDICINE

## 2019-08-05 RX ORDER — LIDOCAINE HYDROCHLORIDE 10 MG/ML
INJECTION, SOLUTION INFILTRATION; PERINEURAL AS NEEDED
Status: DISCONTINUED | OUTPATIENT
Start: 2019-08-05 | End: 2019-08-05 | Stop reason: SURG

## 2019-08-05 RX ORDER — PROPOFOL 10 MG/ML
INJECTION, EMULSION INTRAVENOUS AS NEEDED
Status: DISCONTINUED | OUTPATIENT
Start: 2019-08-05 | End: 2019-08-05 | Stop reason: SURG

## 2019-08-05 RX ORDER — SODIUM CHLORIDE, SODIUM LACTATE, POTASSIUM CHLORIDE, CALCIUM CHLORIDE 600; 310; 30; 20 MG/100ML; MG/100ML; MG/100ML; MG/100ML
125 INJECTION, SOLUTION INTRAVENOUS CONTINUOUS
Status: DISCONTINUED | OUTPATIENT
Start: 2019-08-05 | End: 2019-08-09 | Stop reason: HOSPADM

## 2019-08-05 RX ORDER — MELATONIN
1000 DAILY
COMMUNITY

## 2019-08-05 RX ORDER — SODIUM CHLORIDE, SODIUM LACTATE, POTASSIUM CHLORIDE, CALCIUM CHLORIDE 600; 310; 30; 20 MG/100ML; MG/100ML; MG/100ML; MG/100ML
INJECTION, SOLUTION INTRAVENOUS CONTINUOUS PRN
Status: DISCONTINUED | OUTPATIENT
Start: 2019-08-05 | End: 2019-08-05 | Stop reason: SURG

## 2019-08-05 RX ADMIN — PROPOFOL 100 MG: 10 INJECTION, EMULSION INTRAVENOUS at 11:21

## 2019-08-05 RX ADMIN — PROPOFOL 50 MG: 10 INJECTION, EMULSION INTRAVENOUS at 11:31

## 2019-08-05 RX ADMIN — LIDOCAINE HYDROCHLORIDE 30 MG: 10 INJECTION, SOLUTION INFILTRATION; PERINEURAL at 11:21

## 2019-08-05 RX ADMIN — PROPOFOL 50 MG: 10 INJECTION, EMULSION INTRAVENOUS at 11:24

## 2019-08-05 RX ADMIN — SODIUM CHLORIDE, SODIUM LACTATE, POTASSIUM CHLORIDE, AND CALCIUM CHLORIDE: .6; .31; .03; .02 INJECTION, SOLUTION INTRAVENOUS at 11:03

## 2019-08-05 RX ADMIN — PROPOFOL 50 MG: 10 INJECTION, EMULSION INTRAVENOUS at 11:26

## 2019-08-05 NOTE — DISCHARGE INSTRUCTIONS
Colonoscopy   WHAT YOU NEED TO KNOW:   A colonoscopy is a procedure to examine the inside of your colon (intestine) with a scope  Polyps or tissue growths may have been removed during your colonoscopy  It is normal to feel bloated and to have some abdominal discomfort  You should be passing gas  If you have hemorrhoids or you had polyps removed, you may have a small amount of bleeding  DISCHARGE INSTRUCTIONS:   Seek care immediately if:   · You have a large amount of bright red blood in your bowel movements  · Your abdomen is hard and firm and you have severe pain  · You have sudden trouble breathing  Contact your healthcare provider if:   · You develop a rash or hives  · You have a fever within 24 hours of your procedure  · You have not had a bowel movement for 3 days after your procedure  · You have questions or concerns about your condition or care  Activity:   · Do not lift, strain, or run  for 3 days after your procedure  · Rest after your procedure  You have been given medicine to relax you  Do not  drive or make important decisions until the day after your procedure  Return to your normal activity as directed  · Relieve gas and discomfort from bloating  by lying on your right side with a heating pad on your abdomen  You may need to take short walks to help the gas move out  Eat small meals until bloating is relieved  If you had polyps removed: For 7 days after your procedure:  · Do not  take aspirin  · Do not  go on long car rides  Help prevent constipation:   · Eat a variety of healthy foods  Healthy foods include fruit, vegetables, whole-grain breads, low-fat dairy products, beans, lean meat, and fish  Ask if you need to be on a special diet  Your healthcare provider may recommend that you eat high-fiber foods such as cooked beans  Fiber helps you have regular bowel movements  · Drink liquids as directed    Adults should drink between 9 and 13 eight-ounce cups of liquid every day  Ask what amount is best for you  For most people, good liquids to drink are water, juice, and milk  · Exercise as directed  Talk to your healthcare provider about the best exercise plan for you  Exercise can help prevent constipation, decrease your blood pressure and improve your health  Follow up with your healthcare provider as directed:  Write down your questions so you remember to ask them during your visits  © 2017 [employer] Information is for End User's use only and may not be sold, redistributed or otherwise used for commercial purposes  All illustrations and images included in CareNotes® are the copyrighted property of [employer]  or [employer]  The above information is an  only  It is not intended as medical advice for individual conditions or treatments  Talk to your doctor, nurse or pharmacist before following any medical regimen to see if it is safe and effective for you

## 2019-08-05 NOTE — ANESTHESIA POSTPROCEDURE EVALUATION
Post-Op Assessment Note    CV Status:  Stable       Mental Status:  Arousable   Hydration Status:  Stable   PONV Controlled:  None   Airway Patency:  Patent   Post Op Vitals Reviewed: Yes      Staff: CRNA           BP   117/60   Temp      Pulse  54   Resp   22   SpO2   97%

## 2019-08-13 ENCOUNTER — TELEPHONE (OUTPATIENT)
Dept: GASTROENTEROLOGY | Facility: CLINIC | Age: 64
End: 2019-08-13

## 2019-10-14 ENCOUNTER — OFFICE VISIT (OUTPATIENT)
Dept: INTERNAL MEDICINE CLINIC | Facility: CLINIC | Age: 64
End: 2019-10-14
Payer: COMMERCIAL

## 2019-10-14 VITALS
HEART RATE: 62 BPM | DIASTOLIC BLOOD PRESSURE: 80 MMHG | OXYGEN SATURATION: 98 % | WEIGHT: 185 LBS | HEIGHT: 67 IN | BODY MASS INDEX: 29.03 KG/M2 | SYSTOLIC BLOOD PRESSURE: 142 MMHG

## 2019-10-14 DIAGNOSIS — R21 RASH: ICD-10-CM

## 2019-10-14 DIAGNOSIS — J01.11 ACUTE RECURRENT FRONTAL SINUSITIS: Primary | ICD-10-CM

## 2019-10-14 PROCEDURE — 99213 OFFICE O/P EST LOW 20 MIN: CPT | Performed by: INTERNAL MEDICINE

## 2019-10-14 PROCEDURE — 3008F BODY MASS INDEX DOCD: CPT | Performed by: INTERNAL MEDICINE

## 2019-10-14 RX ORDER — AMOXICILLIN 875 MG/1
875 TABLET, COATED ORAL 2 TIMES DAILY
Qty: 20 TABLET | Refills: 0 | Status: SHIPPED | OUTPATIENT
Start: 2019-10-14 | End: 2019-10-24

## 2019-10-14 RX ORDER — FLUTICASONE PROPIONATE 0.05 %
CREAM (GRAM) TOPICAL 2 TIMES DAILY
Qty: 30 G | Refills: 0 | Status: SHIPPED | OUTPATIENT
Start: 2019-10-14 | End: 2020-01-13

## 2019-10-14 NOTE — PROGRESS NOTES
INTERNAL MEDICINE FOLLOW-UP OFFICE VISIT  [facility]    NAME: Winnie Shabazz  AGE: 62 y o  SEX: female  : 1955   MRN: [id number]    DATE: 10/14/2019  TIME: 11:27 AM    Assessment and Plan     Diagnoses and all orders for this visit:    Acute recurrent frontal sinusitis  -     amoxicillin (AMOXIL) 875 mg tablet; Take 1 tablet (875 mg total) by mouth 2 (two) times a day for 10 days   she was told to continue the Flonase nasal spray once a day along with Claritin  Rash  -     fluticasone (CUTIVATE) 0 05 % cream; Apply topically 2 (two) times a day        - Counseling Documentation: patient was counseled regarding: instructions for management, risk factor reductions, prognosis, patient and family education, impressions, risks and benefits of treatment options and importance of compliance with treatment  - Medication Side Effects: Adverse side effects of medications were reviewed with the patient/guardian today  Return to office in: as needed    Chief Complaint     Chief Complaint   Patient presents with    Cough    Foot Injury     right       History of Present Illness     Sinusitis   This is a recurrent problem  The current episode started in the past 7 days  The problem has been gradually worsening since onset  There has been no fever  Her pain is at a severity of 3/10  The pain is mild  Associated symptoms include congestion, ear pain, headaches and sinus pressure  Pertinent negatives include no chills, coughing, diaphoresis, neck pain, shortness of breath, sneezing or sore throat  Past treatments include saline sprays  The treatment provided mild relief         The following portions of the patient's history were reviewed and updated as appropriate: allergies, current medications, past family history, past medical history, past social history, past surgical history and problem list     Review of Systems     Review of Systems   Constitutional: Negative for chills, diaphoresis, fatigue and fever  HENT: Positive for congestion, ear pain and sinus pressure  Negative for ear discharge, hearing loss, postnasal drip, rhinorrhea, sinus pain, sneezing, sore throat and voice change  Eyes: Negative for pain, discharge, redness and visual disturbance  Respiratory: Negative for cough, chest tightness, shortness of breath and wheezing  Cardiovascular: Negative for chest pain, palpitations and leg swelling  Gastrointestinal: Negative for abdominal distention, abdominal pain, blood in stool, constipation, diarrhea, nausea and vomiting  Endocrine: Negative for cold intolerance, heat intolerance, polydipsia, polyphagia and polyuria  Genitourinary: Negative for dysuria, flank pain, frequency, hematuria and urgency  Musculoskeletal: Negative for arthralgias, back pain, gait problem, joint swelling, myalgias, neck pain and neck stiffness  Skin: Negative for rash  Neurological: Positive for headaches  Negative for dizziness, tremors, syncope, facial asymmetry, speech difficulty, weakness, light-headedness and numbness  Hematological: Does not bruise/bleed easily  Psychiatric/Behavioral: Negative for behavioral problems, confusion and sleep disturbance  The patient is not nervous/anxious  Active Problem List     Patient Active Problem List   Diagnosis    Essential hypertension    Mixed hyperlipidemia    Bilateral edema of lower extremity    Muscle cramps    Obesity (BMI 30-39  9)    Bilateral post-traumatic osteoarthritis of knee    Idiopathic peripheral neuropathy    Encounter for gynecological examination without abnormal finding       Objective     /80 (BP Location: Left arm, Patient Position: Sitting, Cuff Size: Standard)   Pulse 62   Ht 5' 7" (1 702 m)   Wt 83 9 kg (185 lb)   LMP  (LMP Unknown)   SpO2 98%   BMI 28 98 kg/m²     Physical Exam   Constitutional: She is oriented to person, place, and time   She appears well-developed and well-nourished  No distress  HENT:   Head: Normocephalic and atraumatic  Right Ear: External ear normal    Left Ear: External ear normal    Nose: Nose normal     Has bilateral frontal sinus tenderness   Eyes: Conjunctivae and EOM are normal  Right eye exhibits no discharge  Left eye exhibits no discharge  No scleral icterus  Neck: Normal range of motion  Neck supple  No JVD present  No tracheal deviation present  No thyromegaly present  Cardiovascular: Normal rate, regular rhythm, normal heart sounds and intact distal pulses  Exam reveals no gallop and no friction rub  No murmur heard  Pulmonary/Chest: Effort normal and breath sounds normal  No respiratory distress  She has no wheezes  She has no rales  She exhibits no tenderness  Abdominal: Soft  Bowel sounds are normal  She exhibits no distension  There is no tenderness  There is no rebound and no guarding  Musculoskeletal: Normal range of motion  She exhibits no edema or tenderness  Lymphadenopathy:     She has no cervical adenopathy  Neurological: She is alert and oriented to person, place, and time  No cranial nerve deficit  She exhibits normal muscle tone  Coordination normal    Skin: Skin is warm and dry  No rash noted  She is not diaphoretic  No erythema  Psychiatric: She has a normal mood and affect   Judgment normal          Current Medications       Current Outpatient Medications:     B Complex Vitamins (B COMPLEX 1 PO), Take by mouth, Disp: , Rfl:     cholecalciferol (VITAMIN D3) 1,000 units tablet, Take 1,000 Units by mouth daily, Disp: , Rfl:     fluticasone (FLONASE) 50 mcg/act nasal spray, 2 sprays into each nostril daily, Disp: 16 g, Rfl: 3    furosemide (LASIX) 20 mg tablet, Take 1 tablet (20 mg total) by mouth daily, Disp: 90 tablet, Rfl: 1    gabapentin (NEURONTIN) 600 MG tablet, Take 1 tablet (600 mg total) by mouth 2 (two) times a day, Disp: 180 tablet, Rfl: 1    losartan (COZAAR) 100 MG tablet, Take 1 tablet (100 mg total) by mouth daily, Disp: 90 tablet, Rfl: 1    rosuvastatin (CRESTOR) 20 MG tablet, Take 1 tablet (20 mg total) by mouth daily, Disp: 90 tablet, Rfl: 1    amoxicillin (AMOXIL) 875 mg tablet, Take 1 tablet (875 mg total) by mouth 2 (two) times a day for 10 days, Disp: 20 tablet, Rfl: 0    fluticasone (CUTIVATE) 0 05 % cream, Apply topically 2 (two) times a day, Disp: 30 g, Rfl: 0    Health Maintenance     Health Maintenance   Topic Date Due    Hepatitis C Screening  1955    DTaP,Tdap,and Td Vaccines (1 - Tdap) 02/24/1977    INFLUENZA VACCINE  07/01/2019    DXA SCAN  01/10/2020    Depression Screening PHQ  01/17/2020    Medicare Annual Wellness Visit (AWV)  01/17/2020    BMI: Followup Plan  01/17/2020    MAMMOGRAM  03/19/2020    BMI: Adult  08/05/2020    Pneumococcal Vaccine: 65+ Years (1 of 2 - PCV13) 02/24/2021    Cervical Cancer Screening  06/24/2022    CRC Screening: Colonoscopy  08/05/2022    Pneumococcal Vaccine: Pediatrics (0 to 5 Years) and At-Risk Patients (6 to 59 Years)  Aged Out    HEPATITIS B VACCINES  Aged Out       There is no immunization history on file for this patient        Susanne Mcnally MD  [facility] [place] [facility] [place]

## 2019-12-24 ENCOUNTER — OFFICE VISIT (OUTPATIENT)
Dept: DERMATOLOGY | Facility: CLINIC | Age: 64
End: 2019-12-24
Payer: COMMERCIAL

## 2019-12-24 DIAGNOSIS — L23.2 ALLERGIC CONTACT DERMATITIS DUE TO COSMETICS: Primary | ICD-10-CM

## 2019-12-24 DIAGNOSIS — Z13.89 SCREENING FOR SKIN CONDITION: ICD-10-CM

## 2019-12-24 DIAGNOSIS — G90.523 COMPLEX REGIONAL PAIN SYNDROME TYPE 1 OF BOTH LOWER EXTREMITIES: ICD-10-CM

## 2019-12-24 PROCEDURE — 99204 OFFICE O/P NEW MOD 45 MIN: CPT | Performed by: DERMATOLOGY
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
## 2019-12-30 DIAGNOSIS — I10 ESSENTIAL HYPERTENSION: ICD-10-CM

## 2019-12-30 RX ORDER — LOSARTAN POTASSIUM 100 MG/1
TABLET ORAL
Qty: 90 TABLET | Refills: 4 | Status: SHIPPED | OUTPATIENT
Start: 2019-12-30 | End: 2020-06-19 | Stop reason: SDUPTHER

## 2020-01-03 ENCOUNTER — APPOINTMENT (OUTPATIENT)
Dept: LAB | Facility: CLINIC | Age: 65
End: 2020-01-03
Payer: COMMERCIAL

## 2020-01-03 DIAGNOSIS — E78.2 MIXED HYPERLIPIDEMIA: ICD-10-CM

## 2020-01-03 DIAGNOSIS — I10 ESSENTIAL HYPERTENSION: ICD-10-CM

## 2020-01-03 DIAGNOSIS — Z11.59 NEED FOR HEPATITIS C SCREENING TEST: ICD-10-CM

## 2020-01-03 LAB
ALBUMIN SERPL BCP-MCNC: 4 G/DL (ref 3.5–5)
ALP SERPL-CCNC: 79 U/L (ref 46–116)
ALT SERPL W P-5'-P-CCNC: 35 U/L (ref 12–78)
ANION GAP SERPL CALCULATED.3IONS-SCNC: 4 MMOL/L (ref 4–13)
AST SERPL W P-5'-P-CCNC: 21 U/L (ref 5–45)
BACTERIA UR QL AUTO: NORMAL /HPF
BASOPHILS # BLD AUTO: 0.06 THOUSANDS/ΜL (ref 0–0.1)
BASOPHILS NFR BLD AUTO: 1 % (ref 0–1)
BILIRUB SERPL-MCNC: 0.46 MG/DL (ref 0.2–1)
BILIRUB UR QL STRIP: NEGATIVE
BUN SERPL-MCNC: 23 MG/DL (ref 5–25)
CALCIUM ALBUM COR SERPL-MCNC: 10.6 MG/DL (ref 8.3–10.1)
CALCIUM SERPL-MCNC: 10.6 MG/DL (ref 8.3–10.1)
CHLORIDE SERPL-SCNC: 110 MMOL/L (ref 100–108)
CHOLEST SERPL-MCNC: 195 MG/DL (ref 50–200)
CLARITY UR: CLEAR
CO2 SERPL-SCNC: 29 MMOL/L (ref 21–32)
COLOR UR: YELLOW
CREAT SERPL-MCNC: 0.67 MG/DL (ref 0.6–1.3)
EOSINOPHIL # BLD AUTO: 0.17 THOUSAND/ΜL (ref 0–0.61)
EOSINOPHIL NFR BLD AUTO: 2 % (ref 0–6)
ERYTHROCYTE [DISTWIDTH] IN BLOOD BY AUTOMATED COUNT: 13.3 % (ref 11.6–15.1)
GFR SERPL CREATININE-BSD FRML MDRD: 94 ML/MIN/1.73SQ M
GLUCOSE P FAST SERPL-MCNC: 89 MG/DL (ref 65–99)
GLUCOSE UR STRIP-MCNC: NEGATIVE MG/DL
HCT VFR BLD AUTO: 39.2 % (ref 34.8–46.1)
HCV AB SER QL: NORMAL
HDLC SERPL-MCNC: 61 MG/DL
HGB BLD-MCNC: 12.7 G/DL (ref 11.5–15.4)
HGB UR QL STRIP.AUTO: NEGATIVE
HYALINE CASTS #/AREA URNS LPF: NORMAL /LPF
IMM GRANULOCYTES # BLD AUTO: 0.02 THOUSAND/UL (ref 0–0.2)
IMM GRANULOCYTES NFR BLD AUTO: 0 % (ref 0–2)
KETONES UR STRIP-MCNC: NEGATIVE MG/DL
LDLC SERPL CALC-MCNC: 105 MG/DL (ref 0–100)
LEUKOCYTE ESTERASE UR QL STRIP: NEGATIVE
LYMPHOCYTES # BLD AUTO: 1.73 THOUSANDS/ΜL (ref 0.6–4.47)
LYMPHOCYTES NFR BLD AUTO: 21 % (ref 14–44)
MCH RBC QN AUTO: 29.5 PG (ref 26.8–34.3)
MCHC RBC AUTO-ENTMCNC: 32.4 G/DL (ref 31.4–37.4)
MCV RBC AUTO: 91 FL (ref 82–98)
MONOCYTES # BLD AUTO: 0.64 THOUSAND/ΜL (ref 0.17–1.22)
MONOCYTES NFR BLD AUTO: 8 % (ref 4–12)
NEUTROPHILS # BLD AUTO: 5.76 THOUSANDS/ΜL (ref 1.85–7.62)
NEUTS SEG NFR BLD AUTO: 68 % (ref 43–75)
NITRITE UR QL STRIP: NEGATIVE
NON-SQ EPI CELLS URNS QL MICRO: NORMAL /HPF
NONHDLC SERPL-MCNC: 134 MG/DL
NRBC BLD AUTO-RTO: 0 /100 WBCS
PH UR STRIP.AUTO: 6 [PH]
PLATELET # BLD AUTO: 266 THOUSANDS/UL (ref 149–390)
PMV BLD AUTO: 11.6 FL (ref 8.9–12.7)
POTASSIUM SERPL-SCNC: 4.3 MMOL/L (ref 3.5–5.3)
PROT SERPL-MCNC: 7.5 G/DL (ref 6.4–8.2)
PROT UR STRIP-MCNC: NEGATIVE MG/DL
RBC # BLD AUTO: 4.3 MILLION/UL (ref 3.81–5.12)
RBC #/AREA URNS AUTO: NORMAL /HPF
SODIUM SERPL-SCNC: 143 MMOL/L (ref 136–145)
SP GR UR STRIP.AUTO: 1.01 (ref 1–1.03)
TRIGL SERPL-MCNC: 144 MG/DL
TSH SERPL DL<=0.05 MIU/L-ACNC: 2.19 UIU/ML (ref 0.36–3.74)
UROBILINOGEN UR QL STRIP.AUTO: 0.2 E.U./DL
WBC # BLD AUTO: 8.38 THOUSAND/UL (ref 4.31–10.16)
WBC #/AREA URNS AUTO: NORMAL /HPF

## 2020-01-03 PROCEDURE — 85025 COMPLETE CBC W/AUTO DIFF WBC: CPT

## 2020-01-03 PROCEDURE — 80053 COMPREHEN METABOLIC PANEL: CPT

## 2020-01-03 PROCEDURE — 86803 HEPATITIS C AB TEST: CPT

## 2020-01-03 PROCEDURE — 84443 ASSAY THYROID STIM HORMONE: CPT

## 2020-01-03 PROCEDURE — 36415 COLL VENOUS BLD VENIPUNCTURE: CPT

## 2020-01-03 PROCEDURE — 80061 LIPID PANEL: CPT

## 2020-01-03 PROCEDURE — 81001 URINALYSIS AUTO W/SCOPE: CPT

## 2020-01-13 ENCOUNTER — OFFICE VISIT (OUTPATIENT)
Dept: INTERNAL MEDICINE CLINIC | Facility: CLINIC | Age: 65
End: 2020-01-13
Payer: COMMERCIAL

## 2020-01-13 VITALS
BODY MASS INDEX: 29.03 KG/M2 | SYSTOLIC BLOOD PRESSURE: 132 MMHG | HEIGHT: 67 IN | DIASTOLIC BLOOD PRESSURE: 84 MMHG | WEIGHT: 185 LBS | OXYGEN SATURATION: 97 % | HEART RATE: 67 BPM

## 2020-01-13 DIAGNOSIS — E78.2 MIXED HYPERLIPIDEMIA: ICD-10-CM

## 2020-01-13 DIAGNOSIS — R60.0 BILATERAL EDEMA OF LOWER EXTREMITY: ICD-10-CM

## 2020-01-13 DIAGNOSIS — G60.9 IDIOPATHIC PERIPHERAL NEUROPATHY: ICD-10-CM

## 2020-01-13 DIAGNOSIS — E28.39 MENOPAUSE OVARIAN FAILURE: ICD-10-CM

## 2020-01-13 DIAGNOSIS — E55.9 VITAMIN D DEFICIENCY: ICD-10-CM

## 2020-01-13 DIAGNOSIS — E66.3 OVERWEIGHT (BMI 25.0-29.9): ICD-10-CM

## 2020-01-13 DIAGNOSIS — I10 ESSENTIAL HYPERTENSION: Primary | ICD-10-CM

## 2020-01-13 DIAGNOSIS — Z11.4 ENCOUNTER FOR SCREENING FOR HUMAN IMMUNODEFICIENCY VIRUS (HIV): ICD-10-CM

## 2020-01-13 PROBLEM — Z01.419 ENCOUNTER FOR GYNECOLOGICAL EXAMINATION WITHOUT ABNORMAL FINDING: Status: RESOLVED | Noted: 2019-06-24 | Resolved: 2020-01-13

## 2020-01-13 PROBLEM — R25.2 MUSCLE CRAMPS: Status: RESOLVED | Noted: 2018-07-05 | Resolved: 2020-01-13

## 2020-01-13 PROBLEM — E66.9 OBESITY (BMI 30-39.9): Status: RESOLVED | Noted: 2018-07-05 | Resolved: 2020-01-13

## 2020-01-13 PROCEDURE — 3079F DIAST BP 80-89 MM HG: CPT | Performed by: INTERNAL MEDICINE

## 2020-01-13 PROCEDURE — 99214 OFFICE O/P EST MOD 30 MIN: CPT | Performed by: INTERNAL MEDICINE

## 2020-01-13 PROCEDURE — 3075F SYST BP GE 130 - 139MM HG: CPT | Performed by: INTERNAL MEDICINE

## 2020-01-13 PROCEDURE — 3008F BODY MASS INDEX DOCD: CPT | Performed by: INTERNAL MEDICINE

## 2020-01-13 PROCEDURE — 1036F TOBACCO NON-USER: CPT | Performed by: INTERNAL MEDICINE
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
## 2020-03-18 DIAGNOSIS — G60.9 IDIOPATHIC PERIPHERAL NEUROPATHY: ICD-10-CM

## 2020-03-19 RX ORDER — GABAPENTIN 600 MG/1
TABLET ORAL
Qty: 180 TABLET | Refills: 3 | Status: SHIPPED | OUTPATIENT
Start: 2020-03-19 | End: 2020-06-19 | Stop reason: SDUPTHER

## 2020-05-29 ENCOUNTER — APPOINTMENT (OUTPATIENT)
Dept: LAB | Facility: HOSPITAL | Age: 65
End: 2020-05-29
Attending: INTERNAL MEDICINE
Payer: COMMERCIAL

## 2020-05-29 DIAGNOSIS — Z11.4 ENCOUNTER FOR SCREENING FOR HUMAN IMMUNODEFICIENCY VIRUS (HIV): ICD-10-CM

## 2020-05-29 DIAGNOSIS — E55.9 VITAMIN D DEFICIENCY: ICD-10-CM

## 2020-05-29 DIAGNOSIS — E78.2 MIXED HYPERLIPIDEMIA: ICD-10-CM

## 2020-05-29 DIAGNOSIS — I10 ESSENTIAL HYPERTENSION: ICD-10-CM

## 2020-05-29 LAB
25(OH)D3 SERPL-MCNC: 27.7 NG/ML (ref 30–100)
ALBUMIN SERPL BCP-MCNC: 3.7 G/DL (ref 3.5–5)
ALP SERPL-CCNC: 76 U/L (ref 46–116)
ALT SERPL W P-5'-P-CCNC: 51 U/L (ref 12–78)
ANION GAP SERPL CALCULATED.3IONS-SCNC: 9 MMOL/L (ref 4–13)
AST SERPL W P-5'-P-CCNC: 28 U/L (ref 5–45)
BASOPHILS # BLD AUTO: 0.07 THOUSANDS/ΜL (ref 0–0.1)
BASOPHILS NFR BLD AUTO: 1 % (ref 0–1)
BILIRUB SERPL-MCNC: 0.6 MG/DL (ref 0.2–1)
BUN SERPL-MCNC: 13 MG/DL (ref 5–25)
CALCIUM SERPL-MCNC: 9.6 MG/DL (ref 8.3–10.1)
CHLORIDE SERPL-SCNC: 109 MMOL/L (ref 100–108)
CHOLEST SERPL-MCNC: 133 MG/DL (ref 50–200)
CO2 SERPL-SCNC: 26 MMOL/L (ref 21–32)
CREAT SERPL-MCNC: 0.63 MG/DL (ref 0.6–1.3)
EOSINOPHIL # BLD AUTO: 0.23 THOUSAND/ΜL (ref 0–0.61)
EOSINOPHIL NFR BLD AUTO: 4 % (ref 0–6)
ERYTHROCYTE [DISTWIDTH] IN BLOOD BY AUTOMATED COUNT: 12.8 % (ref 11.6–15.1)
GFR SERPL CREATININE-BSD FRML MDRD: 95 ML/MIN/1.73SQ M
GLUCOSE P FAST SERPL-MCNC: 91 MG/DL (ref 65–99)
HCT VFR BLD AUTO: 40.1 % (ref 34.8–46.1)
HDLC SERPL-MCNC: 64 MG/DL
HGB BLD-MCNC: 13.2 G/DL (ref 11.5–15.4)
IMM GRANULOCYTES # BLD AUTO: 0.01 THOUSAND/UL (ref 0–0.2)
IMM GRANULOCYTES NFR BLD AUTO: 0 % (ref 0–2)
LDLC SERPL CALC-MCNC: 51 MG/DL (ref 0–100)
LYMPHOCYTES # BLD AUTO: 1.73 THOUSANDS/ΜL (ref 0.6–4.47)
LYMPHOCYTES NFR BLD AUTO: 30 % (ref 14–44)
MCH RBC QN AUTO: 29.3 PG (ref 26.8–34.3)
MCHC RBC AUTO-ENTMCNC: 32.9 G/DL (ref 31.4–37.4)
MCV RBC AUTO: 89 FL (ref 82–98)
MONOCYTES # BLD AUTO: 0.47 THOUSAND/ΜL (ref 0.17–1.22)
MONOCYTES NFR BLD AUTO: 8 % (ref 4–12)
NEUTROPHILS # BLD AUTO: 3.21 THOUSANDS/ΜL (ref 1.85–7.62)
NEUTS SEG NFR BLD AUTO: 57 % (ref 43–75)
NONHDLC SERPL-MCNC: 69 MG/DL
NRBC BLD AUTO-RTO: 0 /100 WBCS
PLATELET # BLD AUTO: 269 THOUSANDS/UL (ref 149–390)
PMV BLD AUTO: 10.3 FL (ref 8.9–12.7)
POTASSIUM SERPL-SCNC: 4.2 MMOL/L (ref 3.5–5.3)
PROT SERPL-MCNC: 7.4 G/DL (ref 6.4–8.2)
RBC # BLD AUTO: 4.51 MILLION/UL (ref 3.81–5.12)
SODIUM SERPL-SCNC: 144 MMOL/L (ref 136–145)
TRIGL SERPL-MCNC: 91 MG/DL
TSH SERPL DL<=0.05 MIU/L-ACNC: 2.01 UIU/ML (ref 0.36–3.74)
WBC # BLD AUTO: 5.72 THOUSAND/UL (ref 4.31–10.16)

## 2020-05-29 PROCEDURE — 84443 ASSAY THYROID STIM HORMONE: CPT

## 2020-05-29 PROCEDURE — 85025 COMPLETE CBC W/AUTO DIFF WBC: CPT

## 2020-05-29 PROCEDURE — 82306 VITAMIN D 25 HYDROXY: CPT

## 2020-05-29 PROCEDURE — 80053 COMPREHEN METABOLIC PANEL: CPT

## 2020-05-29 PROCEDURE — 80061 LIPID PANEL: CPT

## 2020-05-29 PROCEDURE — 87389 HIV-1 AG W/HIV-1&-2 AB AG IA: CPT

## 2020-05-29 PROCEDURE — 36415 COLL VENOUS BLD VENIPUNCTURE: CPT

## 2020-05-31 LAB — HIV 1+2 AB+HIV1 P24 AG SERPL QL IA: NORMAL

## 2020-06-01 DIAGNOSIS — E28.39 MENOPAUSE OVARIAN FAILURE: ICD-10-CM

## 2020-06-19 ENCOUNTER — OFFICE VISIT (OUTPATIENT)
Dept: INTERNAL MEDICINE CLINIC | Facility: CLINIC | Age: 65
End: 2020-06-19
Payer: COMMERCIAL

## 2020-06-19 ENCOUNTER — APPOINTMENT (OUTPATIENT)
Dept: LAB | Facility: CLINIC | Age: 65
End: 2020-06-19
Payer: COMMERCIAL

## 2020-06-19 VITALS
HEART RATE: 74 BPM | HEIGHT: 67 IN | BODY MASS INDEX: 30.26 KG/M2 | TEMPERATURE: 99.1 F | WEIGHT: 192.8 LBS | DIASTOLIC BLOOD PRESSURE: 84 MMHG | SYSTOLIC BLOOD PRESSURE: 134 MMHG | OXYGEN SATURATION: 97 %

## 2020-06-19 DIAGNOSIS — G60.9 IDIOPATHIC PERIPHERAL NEUROPATHY: ICD-10-CM

## 2020-06-19 DIAGNOSIS — Z20.822 EXPOSURE TO COVID-19 VIRUS: ICD-10-CM

## 2020-06-19 DIAGNOSIS — E55.9 VITAMIN D DEFICIENCY: ICD-10-CM

## 2020-06-19 DIAGNOSIS — E78.2 MIXED HYPERLIPIDEMIA: ICD-10-CM

## 2020-06-19 DIAGNOSIS — R60.0 BILATERAL EDEMA OF LOWER EXTREMITY: ICD-10-CM

## 2020-06-19 DIAGNOSIS — I10 ESSENTIAL HYPERTENSION: Primary | ICD-10-CM

## 2020-06-19 PROBLEM — M79.609 PAIN IN LIMB: Status: ACTIVE | Noted: 2020-06-19

## 2020-06-19 PROBLEM — E78.5 DYSLIPIDEMIA, GOAL TO BE DETERMINED: Status: ACTIVE | Noted: 2020-06-19

## 2020-06-19 PROCEDURE — 86769 SARS-COV-2 COVID-19 ANTIBODY: CPT

## 2020-06-19 PROCEDURE — 3008F BODY MASS INDEX DOCD: CPT | Performed by: INTERNAL MEDICINE

## 2020-06-19 PROCEDURE — 3075F SYST BP GE 130 - 139MM HG: CPT | Performed by: INTERNAL MEDICINE

## 2020-06-19 PROCEDURE — 99214 OFFICE O/P EST MOD 30 MIN: CPT | Performed by: INTERNAL MEDICINE

## 2020-06-19 PROCEDURE — 36415 COLL VENOUS BLD VENIPUNCTURE: CPT

## 2020-06-19 PROCEDURE — 3079F DIAST BP 80-89 MM HG: CPT | Performed by: INTERNAL MEDICINE

## 2020-06-19 PROCEDURE — 1036F TOBACCO NON-USER: CPT | Performed by: INTERNAL MEDICINE

## 2020-06-19 RX ORDER — GABAPENTIN 600 MG/1
600 TABLET ORAL 2 TIMES DAILY
Qty: 180 TABLET | Refills: 3 | Status: SHIPPED | OUTPATIENT
Start: 2020-06-19 | End: 2021-01-13 | Stop reason: SDUPTHER

## 2020-06-19 RX ORDER — LOSARTAN POTASSIUM 100 MG/1
100 TABLET ORAL DAILY
Qty: 90 TABLET | Refills: 3 | Status: SHIPPED | OUTPATIENT
Start: 2020-06-19 | End: 2021-01-12

## 2020-06-19 RX ORDER — FUROSEMIDE 20 MG/1
20 TABLET ORAL DAILY
Qty: 90 TABLET | Refills: 1 | Status: SHIPPED | OUTPATIENT
Start: 2020-06-19 | End: 2021-01-13 | Stop reason: SDUPTHER

## 2020-06-19 RX ORDER — ROSUVASTATIN CALCIUM 20 MG/1
20 TABLET, COATED ORAL DAILY
Qty: 90 TABLET | Refills: 3 | Status: SHIPPED | OUTPATIENT
Start: 2020-06-19 | End: 2021-01-13 | Stop reason: SDUPTHER

## 2020-06-20 ENCOUNTER — TELEPHONE (OUTPATIENT)
Dept: INTERNAL MEDICINE CLINIC | Facility: CLINIC | Age: 65
End: 2020-06-20

## 2020-06-20 LAB
SARS-COV-2 IGG SERPL QL IA: NEGATIVE
SARS-COV-2 IGM SERPL QL IA: NEGATIVE

## 2020-06-23 ENCOUNTER — TELEPHONE (OUTPATIENT)
Dept: ADMINISTRATIVE | Facility: OTHER | Age: 65
End: 2020-06-23

## 2020-06-23 ENCOUNTER — TELEPHONE (OUTPATIENT)
Dept: OBGYN CLINIC | Facility: CLINIC | Age: 65
End: 2020-06-23

## 2021-01-05 ENCOUNTER — LAB (OUTPATIENT)
Dept: LAB | Facility: CLINIC | Age: 66
End: 2021-01-05
Payer: MEDICARE

## 2021-01-05 DIAGNOSIS — I10 ESSENTIAL HYPERTENSION: ICD-10-CM

## 2021-01-05 DIAGNOSIS — E78.2 MIXED HYPERLIPIDEMIA: ICD-10-CM

## 2021-01-05 DIAGNOSIS — E55.9 VITAMIN D DEFICIENCY: ICD-10-CM

## 2021-01-05 LAB
25(OH)D3 SERPL-MCNC: 30.3 NG/ML (ref 30–100)
ALBUMIN SERPL BCP-MCNC: 4 G/DL (ref 3.5–5)
ALP SERPL-CCNC: 83 U/L (ref 46–116)
ALT SERPL W P-5'-P-CCNC: 35 U/L (ref 12–78)
ANION GAP SERPL CALCULATED.3IONS-SCNC: 3 MMOL/L (ref 4–13)
AST SERPL W P-5'-P-CCNC: 22 U/L (ref 5–45)
BASOPHILS # BLD AUTO: 0.07 THOUSANDS/ΜL (ref 0–0.1)
BASOPHILS NFR BLD AUTO: 1 % (ref 0–1)
BILIRUB SERPL-MCNC: 0.8 MG/DL (ref 0.2–1)
BUN SERPL-MCNC: 20 MG/DL (ref 5–25)
CALCIUM SERPL-MCNC: 10.9 MG/DL (ref 8.3–10.1)
CHLORIDE SERPL-SCNC: 111 MMOL/L (ref 100–108)
CHOLEST SERPL-MCNC: 150 MG/DL (ref 50–200)
CO2 SERPL-SCNC: 27 MMOL/L (ref 21–32)
CREAT SERPL-MCNC: 0.7 MG/DL (ref 0.6–1.3)
EOSINOPHIL # BLD AUTO: 0.2 THOUSAND/ΜL (ref 0–0.61)
EOSINOPHIL NFR BLD AUTO: 3 % (ref 0–6)
ERYTHROCYTE [DISTWIDTH] IN BLOOD BY AUTOMATED COUNT: 13.3 % (ref 11.6–15.1)
GFR SERPL CREATININE-BSD FRML MDRD: 92 ML/MIN/1.73SQ M
GLUCOSE P FAST SERPL-MCNC: 91 MG/DL (ref 65–99)
HCT VFR BLD AUTO: 41.8 % (ref 34.8–46.1)
HDLC SERPL-MCNC: 63 MG/DL
HGB BLD-MCNC: 13.4 G/DL (ref 11.5–15.4)
IMM GRANULOCYTES # BLD AUTO: 0.01 THOUSAND/UL (ref 0–0.2)
IMM GRANULOCYTES NFR BLD AUTO: 0 % (ref 0–2)
LDLC SERPL CALC-MCNC: 62 MG/DL (ref 0–100)
LYMPHOCYTES # BLD AUTO: 1.88 THOUSANDS/ΜL (ref 0.6–4.47)
LYMPHOCYTES NFR BLD AUTO: 29 % (ref 14–44)
MCH RBC QN AUTO: 28.9 PG (ref 26.8–34.3)
MCHC RBC AUTO-ENTMCNC: 32.1 G/DL (ref 31.4–37.4)
MCV RBC AUTO: 90 FL (ref 82–98)
MONOCYTES # BLD AUTO: 0.59 THOUSAND/ΜL (ref 0.17–1.22)
MONOCYTES NFR BLD AUTO: 9 % (ref 4–12)
NEUTROPHILS # BLD AUTO: 3.78 THOUSANDS/ΜL (ref 1.85–7.62)
NEUTS SEG NFR BLD AUTO: 58 % (ref 43–75)
NONHDLC SERPL-MCNC: 87 MG/DL
NRBC BLD AUTO-RTO: 0 /100 WBCS
PLATELET # BLD AUTO: 376 THOUSANDS/UL (ref 149–390)
PMV BLD AUTO: 10.9 FL (ref 8.9–12.7)
POTASSIUM SERPL-SCNC: 4.3 MMOL/L (ref 3.5–5.3)
PROT SERPL-MCNC: 8.2 G/DL (ref 6.4–8.2)
RBC # BLD AUTO: 4.64 MILLION/UL (ref 3.81–5.12)
SODIUM SERPL-SCNC: 141 MMOL/L (ref 136–145)
TRIGL SERPL-MCNC: 127 MG/DL
TSH SERPL DL<=0.05 MIU/L-ACNC: 2.08 UIU/ML (ref 0.36–3.74)
WBC # BLD AUTO: 6.53 THOUSAND/UL (ref 4.31–10.16)

## 2021-01-05 PROCEDURE — 80061 LIPID PANEL: CPT

## 2021-01-05 PROCEDURE — 36415 COLL VENOUS BLD VENIPUNCTURE: CPT

## 2021-01-05 PROCEDURE — 85025 COMPLETE CBC W/AUTO DIFF WBC: CPT

## 2021-01-05 PROCEDURE — 84443 ASSAY THYROID STIM HORMONE: CPT

## 2021-01-05 PROCEDURE — 82306 VITAMIN D 25 HYDROXY: CPT

## 2021-01-05 PROCEDURE — 80053 COMPREHEN METABOLIC PANEL: CPT

## 2021-01-12 ENCOUNTER — APPOINTMENT (OUTPATIENT)
Dept: LAB | Facility: CLINIC | Age: 66
End: 2021-01-12
Payer: MEDICARE

## 2021-01-12 ENCOUNTER — OFFICE VISIT (OUTPATIENT)
Dept: INTERNAL MEDICINE CLINIC | Facility: CLINIC | Age: 66
End: 2021-01-12
Payer: MEDICARE

## 2021-01-12 ENCOUNTER — TELEPHONE (OUTPATIENT)
Dept: INTERNAL MEDICINE CLINIC | Facility: CLINIC | Age: 66
End: 2021-01-12

## 2021-01-12 VITALS
WEIGHT: 193 LBS | SYSTOLIC BLOOD PRESSURE: 160 MMHG | HEIGHT: 67 IN | DIASTOLIC BLOOD PRESSURE: 70 MMHG | HEART RATE: 78 BPM | BODY MASS INDEX: 30.29 KG/M2 | TEMPERATURE: 97.1 F | OXYGEN SATURATION: 97 %

## 2021-01-12 DIAGNOSIS — E83.52 HYPERCALCEMIA: ICD-10-CM

## 2021-01-12 DIAGNOSIS — E55.9 VITAMIN D DEFICIENCY: ICD-10-CM

## 2021-01-12 DIAGNOSIS — I10 ESSENTIAL HYPERTENSION: Primary | ICD-10-CM

## 2021-01-12 DIAGNOSIS — R79.89 ELEVATED PTHRP LEVEL: Primary | ICD-10-CM

## 2021-01-12 DIAGNOSIS — E78.2 MIXED HYPERLIPIDEMIA: ICD-10-CM

## 2021-01-12 DIAGNOSIS — E66.9 OBESITY (BMI 30-39.9): ICD-10-CM

## 2021-01-12 PROBLEM — E66.3 OVERWEIGHT (BMI 25.0-29.9): Status: RESOLVED | Noted: 2020-01-13 | Resolved: 2021-01-12

## 2021-01-12 LAB
CA-I BLD-SCNC: 1.39 MMOL/L (ref 1.12–1.32)
PHOSPHATE SERPL-MCNC: 2.4 MG/DL (ref 2.3–4.1)
PTH-INTACT SERPL-MCNC: 87.4 PG/ML (ref 18.4–80.1)

## 2021-01-12 PROCEDURE — 36415 COLL VENOUS BLD VENIPUNCTURE: CPT

## 2021-01-12 PROCEDURE — 83970 ASSAY OF PARATHORMONE: CPT

## 2021-01-12 PROCEDURE — 82330 ASSAY OF CALCIUM: CPT

## 2021-01-12 PROCEDURE — 99214 OFFICE O/P EST MOD 30 MIN: CPT | Performed by: INTERNAL MEDICINE

## 2021-01-12 PROCEDURE — 84100 ASSAY OF PHOSPHORUS: CPT

## 2021-01-12 RX ORDER — OLMESARTAN MEDOXOMIL 40 MG/1
40 TABLET ORAL DAILY
Qty: 90 TABLET | Refills: 1 | Status: SHIPPED | OUTPATIENT
Start: 2021-01-12 | End: 2021-04-20 | Stop reason: SDUPTHER

## 2021-01-12 NOTE — TELEPHONE ENCOUNTER
----- Message from Misty Garcia MD sent at 1/12/2021  6:03 PM EST -----  Labs done today were abnormal   Please inform the patient to get an ultrasound of the parathyroid gland done, ordered
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
## 2021-01-13 ENCOUNTER — TELEPHONE (OUTPATIENT)
Dept: INTERNAL MEDICINE CLINIC | Facility: CLINIC | Age: 66
End: 2021-01-13

## 2021-01-13 ENCOUNTER — OFFICE VISIT (OUTPATIENT)
Dept: DERMATOLOGY | Facility: CLINIC | Age: 66
End: 2021-01-13
Payer: MEDICARE

## 2021-01-13 VITALS — TEMPERATURE: 96.1 F

## 2021-01-13 DIAGNOSIS — R60.0 BILATERAL EDEMA OF LOWER EXTREMITY: ICD-10-CM

## 2021-01-13 DIAGNOSIS — B35.3 TINEA PEDIS OF BOTH FEET: Primary | ICD-10-CM

## 2021-01-13 DIAGNOSIS — E78.2 MIXED HYPERLIPIDEMIA: ICD-10-CM

## 2021-01-13 DIAGNOSIS — G60.9 IDIOPATHIC PERIPHERAL NEUROPATHY: ICD-10-CM

## 2021-01-13 PROCEDURE — 99212 OFFICE O/P EST SF 10 MIN: CPT | Performed by: DERMATOLOGY

## 2021-01-13 RX ORDER — GABAPENTIN 600 MG/1
600 TABLET ORAL 2 TIMES DAILY
Qty: 180 TABLET | Refills: 3 | Status: SHIPPED | OUTPATIENT
Start: 2021-01-13 | End: 2022-06-06

## 2021-01-13 RX ORDER — PRENATAL VIT 91/IRON/FOLIC/DHA 28-975-200
COMBINATION PACKAGE (EA) ORAL 2 TIMES DAILY
Qty: 30 G | Refills: 2 | Status: SHIPPED | OUTPATIENT
Start: 2021-01-13 | End: 2021-04-20

## 2021-01-13 RX ORDER — FUROSEMIDE 20 MG/1
20 TABLET ORAL DAILY
Qty: 90 TABLET | Refills: 1 | Status: SHIPPED | OUTPATIENT
Start: 2021-01-13 | End: 2021-04-20

## 2021-01-13 RX ORDER — ROSUVASTATIN CALCIUM 20 MG/1
20 TABLET, COATED ORAL DAILY
Qty: 90 TABLET | Refills: 3 | Status: SHIPPED | OUTPATIENT
Start: 2021-01-13 | End: 2022-01-03

## 2021-01-13 NOTE — TELEPHONE ENCOUNTER
PT SA[facility] DR Fred Dubose YESTERDAY - 1/12/21    NEEDS REFILLS SENT TO [facility] -     FUROSEMIDE 20MG TABS  90 DAY W/REFILL  GABAPENTIN 600 MG TABS  90 DAY W/REFILL  ROSUVASTATIN 20MG TABS  90 DAY W/REFILL    ANY PROBS - CALL PT
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
## 2021-01-13 NOTE — PATIENT INSTRUCTIONS
At present patient does have a dermatophyte infection go ahead and treat with terbinafine cream if no improvement patient is to let me know I am not sure if the symptomatology that she has at this time is related to this are not but will see if this will improve with treatment

## 2021-01-19 ENCOUNTER — HOSPITAL ENCOUNTER (OUTPATIENT)
Dept: ULTRASOUND IMAGING | Facility: HOSPITAL | Age: 66
Discharge: HOME | End: 2021-01-19
Attending: INTERNAL MEDICINE
Payer: MEDICARE

## 2021-01-19 DIAGNOSIS — R79.89 ELEVATED PTHRP LEVEL: ICD-10-CM

## 2021-01-19 PROCEDURE — 76536 US EXAM OF HEAD AND NECK: CPT

## 2021-01-25 ENCOUNTER — TELEPHONE (OUTPATIENT)
Dept: INTERNAL MEDICINE CLINIC | Facility: CLINIC | Age: 66
End: 2021-01-25

## 2021-01-25 DIAGNOSIS — R79.89 ELEVATED PTHRP LEVEL: Primary | ICD-10-CM

## 2021-01-25 NOTE — TELEPHONE ENCOUNTER
Spoke to the patient and gave her the ultrasound results    Also told her to get a CT of the neck done find out about the parathyroid gland

## 2021-01-25 NOTE — TELEPHONE ENCOUNTER
ANALI Calderon finding on US of head and neck    Carlos La Place from [address] [place] [address] Radiology [phone]

## 2021-02-15 ENCOUNTER — HOSPITAL ENCOUNTER (OUTPATIENT)
Dept: CT IMAGING | Facility: HOSPITAL | Age: 66
Discharge: HOME | End: 2021-02-15
Attending: INTERNAL MEDICINE
Payer: MEDICARE

## 2021-02-15 DIAGNOSIS — R79.89 ELEVATED PTHRP LEVEL: ICD-10-CM

## 2021-02-15 PROCEDURE — G1004 CDSM NDSC: HCPCS

## 2021-02-15 PROCEDURE — 70492 CT SFT TSUE NCK W/O & W/DYE: CPT

## 2021-02-15 RX ADMIN — IOHEXOL 85 ML: 350 INJECTION, SOLUTION INTRAVENOUS at 14:09

## 2021-02-16 ENCOUNTER — TELEPHONE (OUTPATIENT)
Dept: INTERNAL MEDICINE CLINIC | Facility: CLINIC | Age: 66
End: 2021-02-16

## 2021-02-16 DIAGNOSIS — D35.1 PARATHYROID ADENOMA: Primary | ICD-10-CM

## 2021-02-16 NOTE — TELEPHONE ENCOUNTER
----- Message from Enoch Estrada MD sent at 2/16/2021 12:40 PM EST -----  Spoke to the patient about the abnormal CT results  She wants to follow up with Dr Yakov Bernal, ENT    I told her that there is a referral for Dr Foreign Branham in her chart if she wants to consult him

## 2021-03-02 ENCOUNTER — TRANSCRIBE ORDERS (OUTPATIENT)
Dept: ADMINISTRATIVE | Facility: HOSPITAL | Age: 66
End: 2021-03-02

## 2021-03-02 ENCOUNTER — TELEPHONE (OUTPATIENT)
Dept: INTERNAL MEDICINE CLINIC | Facility: CLINIC | Age: 66
End: 2021-03-02

## 2021-03-02 DIAGNOSIS — E21.5 PARATHYROID DISEASE (HCC): Primary | ICD-10-CM

## 2021-03-02 NOTE — TELEPHONE ENCOUNTER
Patient is at [facility] office ENT--  Requesting we fax over 1/3 labs    Fax # [phone]    cb # [phone]

## 2021-03-22 ENCOUNTER — HOSPITAL ENCOUNTER (OUTPATIENT)
Dept: NUCLEAR MEDICINE | Facility: HOSPITAL | Age: 66
Discharge: HOME | End: 2021-03-22
Payer: MEDICARE

## 2021-03-22 DIAGNOSIS — E21.5 PARATHYROID DISEASE (HCC): ICD-10-CM

## 2021-03-22 PROCEDURE — A9500 TC99M SESTAMIBI: HCPCS

## 2021-03-22 PROCEDURE — 78071 PARATHYRD PLANAR W/WO SUBTRJ: CPT

## 2021-03-22 PROCEDURE — G1004 CDSM NDSC: HCPCS

## 2021-03-24 ENCOUNTER — APPOINTMENT (OUTPATIENT)
Dept: LAB | Facility: CLINIC | Age: 66
End: 2021-03-24
Payer: MEDICARE

## 2021-03-24 DIAGNOSIS — E21.5 PARATHYROID DISEASE (HCC): ICD-10-CM

## 2021-03-24 LAB — PTH-INTACT SERPL-MCNC: 101.3 PG/ML (ref 18.4–80.1)

## 2021-03-24 PROCEDURE — 83970 ASSAY OF PARATHORMONE: CPT

## 2021-03-24 PROCEDURE — 36415 COLL VENOUS BLD VENIPUNCTURE: CPT

## 2021-04-19 ENCOUNTER — APPOINTMENT (OUTPATIENT)
Dept: LAB | Facility: CLINIC | Age: 66
End: 2021-04-19
Payer: MEDICARE

## 2021-04-19 ENCOUNTER — TRANSCRIBE ORDERS (OUTPATIENT)
Dept: LAB | Facility: CLINIC | Age: 66
End: 2021-04-19

## 2021-04-19 DIAGNOSIS — E21.5 PARATHYROID DISEASE (HCC): ICD-10-CM

## 2021-04-19 DIAGNOSIS — E21.5 PARATHYROID DISEASE (HCC): Primary | ICD-10-CM

## 2021-04-19 LAB
CALCIUM SERPL-MCNC: 9.2 MG/DL (ref 8.3–10.1)
PTH-INTACT SERPL-MCNC: 45.2 PG/ML (ref 18.4–80.1)

## 2021-04-19 PROCEDURE — 36415 COLL VENOUS BLD VENIPUNCTURE: CPT

## 2021-04-19 PROCEDURE — 83970 ASSAY OF PARATHORMONE: CPT

## 2021-04-19 PROCEDURE — 82310 ASSAY OF CALCIUM: CPT

## 2021-04-20 ENCOUNTER — OFFICE VISIT (OUTPATIENT)
Dept: INTERNAL MEDICINE CLINIC | Facility: CLINIC | Age: 66
End: 2021-04-20
Payer: MEDICARE

## 2021-04-20 VITALS
DIASTOLIC BLOOD PRESSURE: 100 MMHG | HEIGHT: 67 IN | TEMPERATURE: 97 F | HEART RATE: 78 BPM | WEIGHT: 192 LBS | BODY MASS INDEX: 30.13 KG/M2 | OXYGEN SATURATION: 97 % | SYSTOLIC BLOOD PRESSURE: 158 MMHG

## 2021-04-20 DIAGNOSIS — R60.0 BILATERAL EDEMA OF LOWER EXTREMITY: ICD-10-CM

## 2021-04-20 DIAGNOSIS — E21.3 HYPERPARATHYROIDISM (HCC): ICD-10-CM

## 2021-04-20 DIAGNOSIS — I10 ESSENTIAL HYPERTENSION: Primary | ICD-10-CM

## 2021-04-20 PROCEDURE — 99214 OFFICE O/P EST MOD 30 MIN: CPT | Performed by: INTERNAL MEDICINE

## 2021-04-20 RX ORDER — HYDROCHLOROTHIAZIDE 25 MG/1
25 TABLET ORAL DAILY
Qty: 90 TABLET | Refills: 3 | Status: SHIPPED | OUTPATIENT
Start: 2021-04-20 | End: 2021-11-01 | Stop reason: SDUPTHER

## 2021-04-20 RX ORDER — LATANOPROST 50 UG/ML
SOLUTION/ DROPS OPHTHALMIC
COMMUNITY
Start: 2021-04-15

## 2021-04-20 RX ORDER — OLMESARTAN MEDOXOMIL 40 MG/1
40 TABLET ORAL DAILY
Qty: 90 TABLET | Refills: 3 | Status: SHIPPED | OUTPATIENT
Start: 2021-04-20 | End: 2022-03-30
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
## 2021-04-27 ENCOUNTER — TELEPHONE (OUTPATIENT)
Dept: INTERNAL MEDICINE CLINIC | Facility: CLINIC | Age: 66
End: 2021-04-27

## 2021-04-27 DIAGNOSIS — G60.9 IDIOPATHIC PERIPHERAL NEUROPATHY: Primary | ICD-10-CM

## 2021-04-27 NOTE — TELEPHONE ENCOUNTER
PT HAS APPT W/DR HERO BARAHONA  - [facility]    APPT IS 5/22/21    PLEASE PUT DR RSOS IN - CALL PT WHEN READY TO

## 2021-05-06 ENCOUNTER — TELEPHONE (OUTPATIENT)
Dept: INTERNAL MEDICINE CLINIC | Facility: CLINIC | Age: 66
End: 2021-05-06

## 2021-05-06 ENCOUNTER — OFFICE VISIT (OUTPATIENT)
Dept: INTERNAL MEDICINE CLINIC | Facility: CLINIC | Age: 66
End: 2021-05-06
Payer: MEDICARE

## 2021-05-06 ENCOUNTER — HOSPITAL ENCOUNTER (OUTPATIENT)
Dept: RADIOLOGY | Facility: HOSPITAL | Age: 66
Discharge: HOME | End: 2021-05-06
Attending: INTERNAL MEDICINE
Payer: MEDICARE

## 2021-05-06 VITALS
DIASTOLIC BLOOD PRESSURE: 78 MMHG | OXYGEN SATURATION: 97 % | TEMPERATURE: 97 F | SYSTOLIC BLOOD PRESSURE: 122 MMHG | HEIGHT: 67 IN | WEIGHT: 189 LBS | BODY MASS INDEX: 29.66 KG/M2 | HEART RATE: 91 BPM

## 2021-05-06 DIAGNOSIS — M79.642 BILATERAL HAND PAIN: ICD-10-CM

## 2021-05-06 DIAGNOSIS — G89.29 CHRONIC PAIN OF BOTH SHOULDERS: ICD-10-CM

## 2021-05-06 DIAGNOSIS — M25.511 CHRONIC PAIN OF BOTH SHOULDERS: ICD-10-CM

## 2021-05-06 DIAGNOSIS — M79.641 BILATERAL HAND PAIN: ICD-10-CM

## 2021-05-06 DIAGNOSIS — I10 ESSENTIAL HYPERTENSION: ICD-10-CM

## 2021-05-06 DIAGNOSIS — M79.642 BILATERAL HAND PAIN: Primary | ICD-10-CM

## 2021-05-06 DIAGNOSIS — M17.2 BILATERAL POST-TRAUMATIC OSTEOARTHRITIS OF KNEE: ICD-10-CM

## 2021-05-06 DIAGNOSIS — M79.641 BILATERAL HAND PAIN: Primary | ICD-10-CM

## 2021-05-06 DIAGNOSIS — M19.90 ARTHRITIS: Primary | ICD-10-CM

## 2021-05-06 DIAGNOSIS — M25.512 CHRONIC PAIN OF BOTH SHOULDERS: ICD-10-CM

## 2021-05-06 PROCEDURE — 73030 X-RAY EXAM OF SHOULDER: CPT

## 2021-05-06 PROCEDURE — 99214 OFFICE O/P EST MOD 30 MIN: CPT | Performed by: INTERNAL MEDICINE

## 2021-05-06 PROCEDURE — 73130 X-RAY EXAM OF HAND: CPT
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
## 2021-05-06 NOTE — TELEPHONE ENCOUNTER
Josye Bradley is requesting an order for OT for the hand  They will keep the PT order for Shoulder and Knee      FAX to: [phone]

## 2021-05-07 ENCOUNTER — APPOINTMENT (OUTPATIENT)
Dept: LAB | Facility: HOSPITAL | Age: 66
End: 2021-05-07
Attending: INTERNAL MEDICINE
Payer: MEDICARE

## 2021-05-07 DIAGNOSIS — I10 ESSENTIAL HYPERTENSION: ICD-10-CM

## 2021-05-07 DIAGNOSIS — M19.90 ARTHRITIS: ICD-10-CM

## 2021-05-07 LAB
ALBUMIN SERPL BCP-MCNC: 3.5 G/DL (ref 3.5–5)
ALP SERPL-CCNC: 81 U/L (ref 46–116)
ALT SERPL W P-5'-P-CCNC: 24 U/L (ref 12–78)
ANION GAP SERPL CALCULATED.3IONS-SCNC: 9 MMOL/L (ref 4–13)
AST SERPL W P-5'-P-CCNC: 17 U/L (ref 5–45)
BASOPHILS # BLD AUTO: 0.05 THOUSANDS/ΜL (ref 0–0.1)
BASOPHILS NFR BLD AUTO: 1 % (ref 0–1)
BILIRUB SERPL-MCNC: 0.43 MG/DL (ref 0.2–1)
BUN SERPL-MCNC: 16 MG/DL (ref 5–25)
CALCIUM SERPL-MCNC: 9.2 MG/DL (ref 8.3–10.1)
CHLORIDE SERPL-SCNC: 106 MMOL/L (ref 100–108)
CO2 SERPL-SCNC: 28 MMOL/L (ref 21–32)
CREAT SERPL-MCNC: 0.72 MG/DL (ref 0.6–1.3)
EOSINOPHIL # BLD AUTO: 0.17 THOUSAND/ΜL (ref 0–0.61)
EOSINOPHIL NFR BLD AUTO: 2 % (ref 0–6)
ERYTHROCYTE [DISTWIDTH] IN BLOOD BY AUTOMATED COUNT: 12.6 % (ref 11.6–15.1)
GFR SERPL CREATININE-BSD FRML MDRD: 88 ML/MIN/1.73SQ M
GLUCOSE P FAST SERPL-MCNC: 96 MG/DL (ref 65–99)
HCT VFR BLD AUTO: 39.2 % (ref 34.8–46.1)
HGB BLD-MCNC: 12.9 G/DL (ref 11.5–15.4)
IMM GRANULOCYTES # BLD AUTO: 0.03 THOUSAND/UL (ref 0–0.2)
IMM GRANULOCYTES NFR BLD AUTO: 0 % (ref 0–2)
LYMPHOCYTES # BLD AUTO: 1.23 THOUSANDS/ΜL (ref 0.6–4.47)
LYMPHOCYTES NFR BLD AUTO: 17 % (ref 14–44)
MCH RBC QN AUTO: 28.6 PG (ref 26.8–34.3)
MCHC RBC AUTO-ENTMCNC: 32.9 G/DL (ref 31.4–37.4)
MCV RBC AUTO: 87 FL (ref 82–98)
MONOCYTES # BLD AUTO: 0.73 THOUSAND/ΜL (ref 0.17–1.22)
MONOCYTES NFR BLD AUTO: 10 % (ref 4–12)
NEUTROPHILS # BLD AUTO: 5.07 THOUSANDS/ΜL (ref 1.85–7.62)
NEUTS SEG NFR BLD AUTO: 70 % (ref 43–75)
NRBC BLD AUTO-RTO: 0 /100 WBCS
PLATELET # BLD AUTO: 340 THOUSANDS/UL (ref 149–390)
PMV BLD AUTO: 10.1 FL (ref 8.9–12.7)
POTASSIUM SERPL-SCNC: 3.9 MMOL/L (ref 3.5–5.3)
PROT SERPL-MCNC: 8 G/DL (ref 6.4–8.2)
RBC # BLD AUTO: 4.51 MILLION/UL (ref 3.81–5.12)
SODIUM SERPL-SCNC: 143 MMOL/L (ref 136–145)
URATE SERPL-MCNC: 6.3 MG/DL (ref 2–6.8)
WBC # BLD AUTO: 7.28 THOUSAND/UL (ref 4.31–10.16)

## 2021-05-07 PROCEDURE — 86200 CCP ANTIBODY: CPT

## 2021-05-07 PROCEDURE — 86618 LYME DISEASE ANTIBODY: CPT

## 2021-05-07 PROCEDURE — 86430 RHEUMATOID FACTOR TEST QUAL: CPT

## 2021-05-07 PROCEDURE — 86225 DNA ANTIBODY NATIVE: CPT

## 2021-05-07 PROCEDURE — 80053 COMPREHEN METABOLIC PANEL: CPT

## 2021-05-07 PROCEDURE — 85025 COMPLETE CBC W/AUTO DIFF WBC: CPT

## 2021-05-07 PROCEDURE — 36415 COLL VENOUS BLD VENIPUNCTURE: CPT

## 2021-05-07 PROCEDURE — 84550 ASSAY OF BLOOD/URIC ACID: CPT

## 2021-05-07 PROCEDURE — 86235 NUCLEAR ANTIGEN ANTIBODY: CPT

## 2021-05-07 PROCEDURE — 86038 ANTINUCLEAR ANTIBODIES: CPT

## 2021-05-08 LAB
B BURGDOR IGG+IGM SER-ACNC: 80
DSDNA AB SER-ACNC: 12 IU/ML (ref 0–9)
ENA SS-A AB SER-ACNC: <0.2 AI (ref 0–0.9)
ENA SS-B AB SER-ACNC: <0.2 AI (ref 0–0.9)

## 2021-05-10 ENCOUNTER — TELEPHONE (OUTPATIENT)
Dept: INTERNAL MEDICINE CLINIC | Facility: CLINIC | Age: 66
End: 2021-05-10

## 2021-05-10 DIAGNOSIS — M19.90 ARTHRITIS: Primary | ICD-10-CM

## 2021-05-10 LAB
RHEUMATOID FACT SER QL LA: NEGATIVE
RYE IGE QN: NEGATIVE

## 2021-05-10 NOTE — TELEPHONE ENCOUNTER
There are some labs that are still pending    One of tests for lupus is suspicious, please make an appointment with the rheumatologist

## 2021-05-11 ENCOUNTER — TELEPHONE (OUTPATIENT)
Dept: INTERNAL MEDICINE CLINIC | Facility: CLINIC | Age: 66
End: 2021-05-11

## 2021-05-11 LAB — CCP AB SER IA-ACNC: 4.7

## 2021-05-11 NOTE — TELEPHONE ENCOUNTER
----- Message from Eric Martínez MD sent at 5/11/2021 12:36 PM EDT -----  Labs do not show rheumatoid arthritis    Please make an appointment with the rheumatologist to find out about the other abnormal labs that I told you before

## 2021-05-12 ENCOUNTER — APPOINTMENT (OUTPATIENT)
Dept: LAB | Facility: CLINIC | Age: 66
End: 2021-05-12
Payer: MEDICARE

## 2021-05-12 ENCOUNTER — TELEPHONE (OUTPATIENT)
Dept: NEUROLOGY | Facility: CLINIC | Age: 66
End: 2021-05-12

## 2021-05-12 ENCOUNTER — OFFICE VISIT (OUTPATIENT)
Dept: INTERNAL MEDICINE CLINIC | Facility: CLINIC | Age: 66
End: 2021-05-12
Payer: MEDICARE

## 2021-05-12 VITALS
HEIGHT: 67 IN | SYSTOLIC BLOOD PRESSURE: 132 MMHG | TEMPERATURE: 96.8 F | BODY MASS INDEX: 29.79 KG/M2 | HEART RATE: 75 BPM | OXYGEN SATURATION: 97 % | WEIGHT: 189.8 LBS | DIASTOLIC BLOOD PRESSURE: 84 MMHG

## 2021-05-12 DIAGNOSIS — M19.90 ARTHRITIS: Primary | ICD-10-CM

## 2021-05-12 DIAGNOSIS — E55.9 VITAMIN D DEFICIENCY: ICD-10-CM

## 2021-05-12 DIAGNOSIS — M19.90 ARTHRITIS: ICD-10-CM

## 2021-05-12 LAB
25(OH)D3 SERPL-MCNC: 25.4 NG/ML (ref 30–100)
ALBUMIN SERPL BCP-MCNC: 3.6 G/DL (ref 3.5–5)
ALP SERPL-CCNC: 77 U/L (ref 46–116)
ALT SERPL W P-5'-P-CCNC: 22 U/L (ref 12–78)
AST SERPL W P-5'-P-CCNC: 11 U/L (ref 5–45)
BILIRUB DIRECT SERPL-MCNC: 0.1 MG/DL (ref 0–0.2)
BILIRUB SERPL-MCNC: 0.27 MG/DL (ref 0.2–1)
C3 SERPL-MCNC: 142 MG/DL (ref 90–180)
C4 SERPL-MCNC: 43 MG/DL (ref 10–40)
CA-I BLD-SCNC: 1.22 MMOL/L (ref 1.12–1.32)
CK SERPL-CCNC: 128 U/L (ref 26–192)
CRP SERPL QL: 16.1 MG/L
ERYTHROCYTE [SEDIMENTATION RATE] IN BLOOD: 45 MM/HOUR (ref 0–29)
PHOSPHATE SERPL-MCNC: 3.4 MG/DL (ref 2.3–4.1)
PROT SERPL-MCNC: 8.1 G/DL (ref 6.4–8.2)
PTH-INTACT SERPL-MCNC: 48.5 PG/ML (ref 18.4–80.1)

## 2021-05-12 PROCEDURE — 86235 NUCLEAR ANTIGEN ANTIBODY: CPT

## 2021-05-12 PROCEDURE — 86038 ANTINUCLEAR ANTIBODIES: CPT

## 2021-05-12 PROCEDURE — 86140 C-REACTIVE PROTEIN: CPT

## 2021-05-12 PROCEDURE — 84100 ASSAY OF PHOSPHORUS: CPT

## 2021-05-12 PROCEDURE — 99213 OFFICE O/P EST LOW 20 MIN: CPT | Performed by: INTERNAL MEDICINE

## 2021-05-12 PROCEDURE — 83615 LACTATE (LD) (LDH) ENZYME: CPT

## 2021-05-12 PROCEDURE — 36415 COLL VENOUS BLD VENIPUNCTURE: CPT

## 2021-05-12 PROCEDURE — 86592 SYPHILIS TEST NON-TREP QUAL: CPT

## 2021-05-12 PROCEDURE — 83970 ASSAY OF PARATHORMONE: CPT

## 2021-05-12 PROCEDURE — 85652 RBC SED RATE AUTOMATED: CPT

## 2021-05-12 PROCEDURE — 82330 ASSAY OF CALCIUM: CPT

## 2021-05-12 PROCEDURE — 83625 ASSAY OF LDH ENZYMES: CPT

## 2021-05-12 PROCEDURE — 82306 VITAMIN D 25 HYDROXY: CPT

## 2021-05-12 PROCEDURE — 80076 HEPATIC FUNCTION PANEL: CPT

## 2021-05-12 PROCEDURE — 82550 ASSAY OF CK (CPK): CPT

## 2021-05-12 PROCEDURE — 86160 COMPLEMENT ANTIGEN: CPT

## 2021-05-12 PROCEDURE — 86225 DNA ANTIBODY NATIVE: CPT

## 2021-05-12 NOTE — TELEPHONE ENCOUNTER
Spoke with patient states she no longer needs appointment will follow with Dr Taye Avalos  05-12-21 if appointment needed she will call back to schedule      Shreya Aragon / Neuropathy / Medicare A &  B

## 2021-05-12 NOTE — PROGRESS NOTES
INTERNAL MEDICINE FOLLOW-UP OFFICE VISIT  [facility]    NAME: Kaycee Shabazz  AGE: 73 y o  SEX: female  : 1955   MRN: [id number]    DATE: 2021  TIME: 11:08 AM    Assessment and Plan     Diagnoses and all orders for this visit:    Arthritis  -     BARB Screen w/ Reflex to Titer/Pattern; Future  -     Anti-DNA antibody, double-stranded; Future  -     Hepatic function panel; Future  -     Nuclear antigen antibody; Future  -     Sedimentation rate, automated; Future  -     RPR; Future  -     C-reactive protein; Future  -     CK (with reflex to MB); Future  -     Lactate dehydrogenase, isoenzymes; Future  -     Complement Comp C3 + C4; Future  -     Calcium, ionized; Future  -     PTH, intact; Future  -     Phosphorus; Future   was told to follow-up with rheumatology  The present appointment she has is in August and she thinks she cannot wait for that long  I offered to give her the steroids for relief of symptoms but she wants to find out the reason for the pains in her joints  Vitamin D deficiency  -     Vitamin D 25 hydroxy; Future        - Counseling Documentation: patient was counseled regarding: diagnostic results, instructions for management, risk factor reductions, prognosis, patient and family education, risks and benefits of treatment options and importance of compliance with treatment  - Medication Side Effects: Adverse side effects of medications were reviewed with the patient/guardian today  Return to office in:  As needed    Chief Complaint     Chief Complaint   Patient presents with    joints hurt       History of Present Illness     HPI  Patient has developed severe pain in all her joints including her hands, fingers, elbows, shoulder, knees  It is to the point where she is unable to do her activities without pain  I had multiple labs done and the only thing that was abnormal was the double-stranded DNA    All the other labs were within normal limits  She wants me to repeat the labs and do some more labs to find out why this is happening  The results of her x-rays of the hands and shoulders are pending  The following portions of the patient's history were reviewed and updated as appropriate: allergies, current medications, past family history, past medical history, past social history, past surgical history and problem list     Review of Systems     Review of Systems   Constitutional: Positive for fatigue  Negative for chills, diaphoresis and fever  HENT: Negative for congestion, ear discharge, ear pain, hearing loss, postnasal drip, rhinorrhea, sinus pressure, sinus pain, sneezing, sore throat and voice change  Eyes: Negative for pain, discharge, redness and visual disturbance  Respiratory: Negative for cough, chest tightness, shortness of breath and wheezing  Cardiovascular: Negative for chest pain, palpitations and leg swelling  Gastrointestinal: Negative for abdominal distention, abdominal pain, blood in stool, constipation, diarrhea, nausea and vomiting  Endocrine: Negative for cold intolerance, heat intolerance, polydipsia, polyphagia and polyuria  Genitourinary: Negative for dysuria, flank pain, frequency, hematuria and urgency  Musculoskeletal: Positive for arthralgias, gait problem and joint swelling  Negative for back pain, myalgias, neck pain and neck stiffness  Skin: Negative for rash  Neurological: Negative for dizziness, tremors, syncope, facial asymmetry, speech difficulty, weakness, light-headedness, numbness and headaches  Hematological: Does not bruise/bleed easily  Psychiatric/Behavioral: Negative for behavioral problems, confusion and sleep disturbance  The patient is not nervous/anxious  Active Problem List     Patient Active Problem List   Diagnosis    Essential hypertension    Mixed hyperlipidemia    Bilateral edema of lower extremity    Obesity (BMI 30-39  9)    Bilateral post-traumatic osteoarthritis of knee    Idiopathic peripheral neuropathy    Vitamin D deficiency    Pain in limb    Dyslipidemia, goal to be determined    Hypercalcemia    Hyperparathyroidism (HCC)    Arthritis    Bilateral hand pain    Chronic pain of both shoulders       Objective     /84 (BP Location: Left arm, Patient Position: Sitting) Comment: gdfgdf  Pulse 75   Temp (!) 96 8 °F (36 °C) (Temporal) Comment: fsdfsdf  Ht 5' 7" (1 702 m)   Wt 86 1 kg (189 lb 12 8 oz)   LMP  (LMP Unknown)   SpO2 97%   BMI 29 73 kg/m²     Physical Exam  Constitutional:       General: She is not in acute distress  Appearance: She is well-developed  She is not diaphoretic  HENT:      Head: Normocephalic and atraumatic  Right Ear: External ear normal       Left Ear: External ear normal       Nose: Nose normal    Eyes:      General: No scleral icterus  Right eye: No discharge  Left eye: No discharge  Conjunctiva/sclera: Conjunctivae normal    Neck:      Musculoskeletal: Normal range of motion and neck supple  Thyroid: No thyromegaly  Vascular: No JVD  Trachea: No tracheal deviation  Cardiovascular:      Rate and Rhythm: Normal rate and regular rhythm  Heart sounds: Normal heart sounds  No murmur  No friction rub  No gallop  Pulmonary:      Effort: Pulmonary effort is normal  No respiratory distress  Breath sounds: Normal breath sounds  No wheezing or rales  Chest:      Chest wall: No tenderness  Abdominal:      General: Bowel sounds are normal  There is no distension  Palpations: Abdomen is soft  Tenderness: There is no abdominal tenderness  There is no guarding or rebound  Musculoskeletal: Normal range of motion  General: Swelling, tenderness and deformity present  Comments: There is swelling and tenderness of all her finger joints with some deformity    There is severe restriction of movement of the joints   Lymphadenopathy: Cervical: No cervical adenopathy  Skin:     General: Skin is warm and dry  Findings: No erythema or rash  Neurological:      Mental Status: She is alert and oriented to person, place, and time  Cranial Nerves: No cranial nerve deficit  Motor: No abnormal muscle tone        Coordination: Coordination normal    Psychiatric:         Judgment: Judgment normal              Current Medications       Current Outpatient Medications:     B Complex Vitamins (B COMPLEX 1 PO), Take by mouth, Disp: , Rfl:     cholecalciferol (VITAMIN D3) 1,000 units tablet, Take 1,000 Units by mouth daily, Disp: , Rfl:     gabapentin (NEURONTIN) 600 MG tablet, Take 1 tablet (600 mg total) by mouth 2 (two) times a day (Patient taking differently: Take 600 mg by mouth daily at bedtime ), Disp: 180 tablet, Rfl: 3    hydrochlorothiazide (HYDRODIURIL) 25 mg tablet, Take 1 tablet (25 mg total) by mouth daily, Disp: 90 tablet, Rfl: 3    latanoprost (XALATAN) 0 005 % ophthalmic solution, INSTILL 1 DROP IN BOTH EYES EVERY DAY AT BEDTIME, Disp: , Rfl:     olmesartan (BENICAR) 40 mg tablet, Take 1 tablet (40 mg total) by mouth daily, Disp: 90 tablet, Rfl: 3    rosuvastatin (CRESTOR) 20 MG tablet, Take 1 tablet (20 mg total) by mouth daily (Patient not taking: Reported on 5/6/2021), Disp: 90 tablet, Rfl: 3    Health Maintenance     Health Maintenance   Topic Date Due    COVID-19 Vaccine (1) Never done    DTaP,Tdap,and Td Vaccines (1 - Tdap) Never done    Pneumococcal Vaccine: 65+ Years (1 of 1 - PPSV23) Never done    MAMMOGRAM  06/01/2021    Fall Risk  06/19/2021    Medicare Annual Wellness Visit (AWV)  06/19/2021    Influenza Vaccine (Season Ended) 09/01/2021    Depression Screening PHQ  01/12/2022    BMI: Followup Plan  01/12/2022    BMI: Adult  05/12/2022    DXA SCAN  05/29/2022    Colonoscopy Surveillance  08/05/2022    Colorectal Cancer Screening  08/05/2029    HIV Screening  Completed    Hepatitis C Screening  Completed    HIB Vaccine  Aged Out    Hepatitis B Vaccine  Aged Out    IPV Vaccine  Aged Out    Hepatitis A Vaccine  Aged Out    Meningococcal ACWY Vaccine  Aged Out    HPV Vaccine  Aged Out       There is no immunization history on file for this patient        Jose Orantes MD  [address] [facility] of BEHAVIORAL MEDICINE AT [facility]

## 2021-05-13 LAB
DSDNA AB SER-ACNC: 15 IU/ML (ref 0–9)
ENA RNP AB SER-ACNC: <0.2 AI (ref 0–0.9)
ENA SM AB SER-ACNC: <0.2 AI (ref 0–0.9)
LDH SERPL-CCNC: 193 IU/L (ref 119–226)
LDH1 CFR SERPL ELPH: 22 % (ref 17–32)
LDH2 CFR SERPL ELPH: 35 % (ref 25–40)
LDH3 CFR SERPL ELPH: 22 % (ref 17–27)
LDH4 CFR SERPL ELPH: 10 % (ref 5–13)
LDH5 CFR SERPL ELPH: 11 % (ref 4–20)
RPR SER QL: NORMAL
RYE IGE QN: NEGATIVE

## 2021-05-14 ENCOUNTER — IMMUNIZATIONS (OUTPATIENT)
Dept: FAMILY MEDICINE CLINIC | Facility: HOSPITAL | Age: 66
End: 2021-05-14

## 2021-05-14 DIAGNOSIS — Z23 ENCOUNTER FOR IMMUNIZATION: Primary | ICD-10-CM

## 2021-05-14 PROCEDURE — 91300 SARS-COV-2 / COVID-19 MRNA VACCINE (PFIZER-BIONTECH) 30 MCG: CPT

## 2021-05-14 PROCEDURE — 0001A SARS-COV-2 / COVID-19 MRNA VACCINE (PFIZER-BIONTECH) 30 MCG: CPT

## 2021-05-17 ENCOUNTER — TELEPHONE (OUTPATIENT)
Dept: INTERNAL MEDICINE CLINIC | Facility: CLINIC | Age: 66
End: 2021-05-17

## 2021-05-17 NOTE — TELEPHONE ENCOUNTER
Patient is asking for lab & x ray results    said she was suppose to call today    is asking to speak to Dr Mary Olson

## 2021-06-04 ENCOUNTER — IMMUNIZATIONS (OUTPATIENT)
Dept: FAMILY MEDICINE CLINIC | Facility: HOSPITAL | Age: 66
End: 2021-06-04

## 2021-06-04 DIAGNOSIS — Z23 ENCOUNTER FOR IMMUNIZATION: Primary | ICD-10-CM

## 2021-06-04 PROCEDURE — 0002A SARS-COV-2 / COVID-19 MRNA VACCINE (PFIZER-BIONTECH) 30 MCG: CPT

## 2021-06-04 PROCEDURE — 91300 SARS-COV-2 / COVID-19 MRNA VACCINE (PFIZER-BIONTECH) 30 MCG: CPT

## 2021-07-06 ENCOUNTER — APPOINTMENT (OUTPATIENT)
Dept: LAB | Facility: CLINIC | Age: 66
End: 2021-07-06
Payer: MEDICARE

## 2021-07-06 DIAGNOSIS — E55.9 VITAMIN D DEFICIENCY: ICD-10-CM

## 2021-07-06 DIAGNOSIS — E78.2 MIXED HYPERLIPIDEMIA: ICD-10-CM

## 2021-07-06 LAB
25(OH)D3 SERPL-MCNC: 40.4 NG/ML (ref 30–100)
ALBUMIN SERPL BCP-MCNC: 3.3 G/DL (ref 3.5–5)
ALP SERPL-CCNC: 66 U/L (ref 46–116)
ALT SERPL W P-5'-P-CCNC: 27 U/L (ref 12–78)
ANION GAP SERPL CALCULATED.3IONS-SCNC: 5 MMOL/L (ref 4–13)
AST SERPL W P-5'-P-CCNC: 18 U/L (ref 5–45)
BASOPHILS # BLD AUTO: 0.05 THOUSANDS/ΜL (ref 0–0.1)
BASOPHILS NFR BLD AUTO: 1 % (ref 0–1)
BILIRUB SERPL-MCNC: 0.43 MG/DL (ref 0.2–1)
BUN SERPL-MCNC: 27 MG/DL (ref 5–25)
CALCIUM ALBUM COR SERPL-MCNC: 9.8 MG/DL (ref 8.3–10.1)
CALCIUM SERPL-MCNC: 9.2 MG/DL (ref 8.3–10.1)
CHLORIDE SERPL-SCNC: 108 MMOL/L (ref 100–108)
CHOLEST SERPL-MCNC: 250 MG/DL (ref 50–200)
CO2 SERPL-SCNC: 27 MMOL/L (ref 21–32)
CREAT SERPL-MCNC: 0.77 MG/DL (ref 0.6–1.3)
EOSINOPHIL # BLD AUTO: 0.29 THOUSAND/ΜL (ref 0–0.61)
EOSINOPHIL NFR BLD AUTO: 5 % (ref 0–6)
ERYTHROCYTE [DISTWIDTH] IN BLOOD BY AUTOMATED COUNT: 13.4 % (ref 11.6–15.1)
GFR SERPL CREATININE-BSD FRML MDRD: 81 ML/MIN/1.73SQ M
GLUCOSE P FAST SERPL-MCNC: 93 MG/DL (ref 65–99)
HCT VFR BLD AUTO: 36.9 % (ref 34.8–46.1)
HDLC SERPL-MCNC: 52 MG/DL
HGB BLD-MCNC: 12 G/DL (ref 11.5–15.4)
IMM GRANULOCYTES # BLD AUTO: 0.02 THOUSAND/UL (ref 0–0.2)
IMM GRANULOCYTES NFR BLD AUTO: 0 % (ref 0–2)
LDLC SERPL CALC-MCNC: 170 MG/DL (ref 0–100)
LYMPHOCYTES # BLD AUTO: 1.7 THOUSANDS/ΜL (ref 0.6–4.47)
LYMPHOCYTES NFR BLD AUTO: 28 % (ref 14–44)
MCH RBC QN AUTO: 29.6 PG (ref 26.8–34.3)
MCHC RBC AUTO-ENTMCNC: 32.5 G/DL (ref 31.4–37.4)
MCV RBC AUTO: 91 FL (ref 82–98)
MONOCYTES # BLD AUTO: 0.6 THOUSAND/ΜL (ref 0.17–1.22)
MONOCYTES NFR BLD AUTO: 10 % (ref 4–12)
NEUTROPHILS # BLD AUTO: 3.32 THOUSANDS/ΜL (ref 1.85–7.62)
NEUTS SEG NFR BLD AUTO: 56 % (ref 43–75)
NONHDLC SERPL-MCNC: 198 MG/DL
NRBC BLD AUTO-RTO: 0 /100 WBCS
PLATELET # BLD AUTO: 278 THOUSANDS/UL (ref 149–390)
PMV BLD AUTO: 10.6 FL (ref 8.9–12.7)
POTASSIUM SERPL-SCNC: 4.2 MMOL/L (ref 3.5–5.3)
PROT SERPL-MCNC: 7.7 G/DL (ref 6.4–8.2)
RBC # BLD AUTO: 4.06 MILLION/UL (ref 3.81–5.12)
SODIUM SERPL-SCNC: 140 MMOL/L (ref 136–145)
TRIGL SERPL-MCNC: 141 MG/DL
TSH SERPL DL<=0.05 MIU/L-ACNC: 4 UIU/ML (ref 0.36–3.74)
WBC # BLD AUTO: 5.98 THOUSAND/UL (ref 4.31–10.16)

## 2021-07-06 PROCEDURE — 80053 COMPREHEN METABOLIC PANEL: CPT

## 2021-07-06 PROCEDURE — 36415 COLL VENOUS BLD VENIPUNCTURE: CPT

## 2021-07-06 PROCEDURE — 85025 COMPLETE CBC W/AUTO DIFF WBC: CPT

## 2021-07-06 PROCEDURE — 84443 ASSAY THYROID STIM HORMONE: CPT

## 2021-07-06 PROCEDURE — 80061 LIPID PANEL: CPT

## 2021-07-06 PROCEDURE — 82306 VITAMIN D 25 HYDROXY: CPT

## 2021-07-07 ENCOUNTER — ANNUAL EXAM (OUTPATIENT)
Dept: OBGYN CLINIC | Facility: CLINIC | Age: 66
End: 2021-07-07
Payer: MEDICARE

## 2021-07-07 ENCOUNTER — TELEPHONE (OUTPATIENT)
Dept: INTERNAL MEDICINE CLINIC | Facility: CLINIC | Age: 66
End: 2021-07-07

## 2021-07-07 VITALS — SYSTOLIC BLOOD PRESSURE: 134 MMHG | DIASTOLIC BLOOD PRESSURE: 80 MMHG | BODY MASS INDEX: 29.54 KG/M2 | WEIGHT: 188.6 LBS

## 2021-07-07 DIAGNOSIS — Z01.419 ENCOUNTER FOR GYNECOLOGICAL EXAMINATION WITHOUT ABNORMAL FINDING: ICD-10-CM

## 2021-07-07 DIAGNOSIS — Z12.31 SCREENING MAMMOGRAM, ENCOUNTER FOR: ICD-10-CM

## 2021-07-07 DIAGNOSIS — Z01.419 ENCOUNTER FOR GYNECOLOGICAL EXAMINATION: Primary | ICD-10-CM

## 2021-07-07 PROCEDURE — G0101 CA SCREEN;PELVIC/BREAST EXAM: HCPCS | Performed by: OBSTETRICS & GYNECOLOGY

## 2021-07-07 NOTE — PATIENT INSTRUCTIONS
Osteopenia   AMBULATORY CARE:   Osteopenia  is a condition that causes bone loss and low bone mineral density (BMD)  Low BMD can weaken your bones and increase your risk for fractures  Osteopenia does not cause signs or symptoms  You may not know you have it until you break a bone  Osteopenia must be managed or treated so it does not worsen and become osteoporosis  Osteoporosis is a serious condition that causes bones to become weak, brittle, and easily fractured  Treatment for osteopenia  depends on your risk for fracture  If your risk is low, you may only need to make exercise, nutrition, and other lifestyle changes to manage osteopenia  The changes can help prevent more bone mineral loss and reduce your risk for fractures  If your risk is high, you may be given medicines to help strengthen your bones, prevent bone breakdown, or increase bone formation  Manage osteopenia:   · Exercise as directed  Do weight-bearing exercises, such as brisk walking, dancing, or yoga  Weight-bearing exercises help build or maintain bone  Exercises such as swimming and bike riding are non-weight-bearing and will not build or maintain bone  Weightlifting also helps strengthen bones and build muscle  Extra muscle can help protect your bones  Your healthcare provider may recommend weightlifting 3 times per week as part of your exercise routine  · Increase calcium and vitamin D as directed  Calcium and vitamin D work together to help build bone  The body deposits calcium into the bones until about age 27  You will then need to get enough calcium and vitamin D to maintain what your bones are storing  Your healthcare provider may tell you to eat more dairy products, such as milk and cheese, for calcium  Spinach, salmon, and dried beans are also good sources of calcium  Cereal, bread, and orange juice may be fortified with vitamin D  You also get vitamin D from exposure to sunlight   Your healthcare provider may also suggest a calcium or vitamin D supplement  Do not take supplements unless directed  · Limit or do not drink alcohol as directed  Alcohol can take calcium from your bones and increase your risk for fractures  Ask your healthcare provider if it is safe for you to drink alcohol  Women should limit alcohol to 1 drink per day  Men should limit alcohol to 2 drinks per day  A drink of alcohol is 12 ounces of beer, 5 ounces of wine, or 1½ ounces of liquor  · Do not smoke  Nicotine can damage blood vessels and make it more difficult to manage your osteopenia  Smoking also affects bone density and increases your risk for bone fractures  Do not use e-cigarettes or smokeless tobacco in place of cigarettes or to help you quit  They still contain nicotine  Ask your healthcare provider for information if you currently smoke and need help quitting  Ask your healthcare provider for information if you need help quitting  · Prevent falls  Decrease your risk for falling by removing items from the floor and removing loose carpets  Turn the lights on where you will be walking  Follow up with your healthcare provider as directed:  Write down your questions so you remember to ask them during your visits  © Copyright [address] Information is for End User's use only and may not be sold, redistributed or otherwise used for commercial purposes  All illustrations and images included in CareNotes® are the copyrighted property of A D A M , Inc  or [facility]   The above information is an  only  It is not intended as medical advice for individual conditions or treatments  Talk to your doctor, nurse or pharmacist before following any medical regimen to see if it is safe and effective for you

## 2021-07-07 NOTE — TELEPHONE ENCOUNTER
PLEASE REVIEW PTS LABS FROM 7/6/21 - PT NEEDS TO BRING COPY TO RHEUMOTOLOGIST APPT 7/12    PLEASE CALL PT
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
## 2021-07-07 NOTE — ASSESSMENT & PLAN NOTE
Pap/HPV current  Mammogram ordered  Colonoscopy current  DEXA current      Encourage healthy diet, exercise, Calcium 1200mg per day and at least 800 iu Vitamin D daily

## 2021-07-12 ENCOUNTER — APPOINTMENT (OUTPATIENT)
Dept: LAB | Facility: CLINIC | Age: 66
End: 2021-07-12
Payer: MEDICARE

## 2021-07-12 DIAGNOSIS — H04.129 DRY EYE SYNDROME, UNSPECIFIED LATERALITY: ICD-10-CM

## 2021-07-12 DIAGNOSIS — M06.4 INFLAMMATORY POLYARTHROPATHY (HCC): ICD-10-CM

## 2021-07-12 LAB — CRP SERPL QL: <3 MG/L

## 2021-07-12 PROCEDURE — 84432 ASSAY OF THYROGLOBULIN: CPT

## 2021-07-12 PROCEDURE — 86140 C-REACTIVE PROTEIN: CPT

## 2021-07-12 PROCEDURE — 86376 MICROSOMAL ANTIBODY EACH: CPT

## 2021-07-12 PROCEDURE — 86235 NUCLEAR ANTIGEN ANTIBODY: CPT

## 2021-07-12 PROCEDURE — 86038 ANTINUCLEAR ANTIBODIES: CPT

## 2021-07-12 PROCEDURE — 86800 THYROGLOBULIN ANTIBODY: CPT

## 2021-07-13 LAB
ENA SS-A AB SER-ACNC: <0.2 AI (ref 0–0.9)
ENA SS-B AB SER-ACNC: <0.2 AI (ref 0–0.9)
THYROGLOB AB SERPL-ACNC: <1 IU/ML (ref 0–0.9)
THYROGLOB SERPL-MCNC: 7.2 NG/ML (ref 1.5–38.5)
THYROPEROXIDASE AB SERPL-ACNC: 8 IU/ML (ref 0–34)

## 2021-07-14 LAB — RYE IGE QN: NEGATIVE

## 2021-07-22 ENCOUNTER — OFFICE VISIT (OUTPATIENT)
Dept: INTERNAL MEDICINE CLINIC | Facility: CLINIC | Age: 66
End: 2021-07-22
Payer: MEDICARE

## 2021-07-22 VITALS
SYSTOLIC BLOOD PRESSURE: 118 MMHG | WEIGHT: 188 LBS | TEMPERATURE: 97.9 F | HEIGHT: 67 IN | BODY MASS INDEX: 29.51 KG/M2 | DIASTOLIC BLOOD PRESSURE: 70 MMHG

## 2021-07-22 DIAGNOSIS — R53.82 CHRONIC FATIGUE: ICD-10-CM

## 2021-07-22 DIAGNOSIS — I10 ESSENTIAL HYPERTENSION: Primary | ICD-10-CM

## 2021-07-22 DIAGNOSIS — E21.3 HYPERPARATHYROIDISM (HCC): ICD-10-CM

## 2021-07-22 DIAGNOSIS — Z00.00 MEDICARE ANNUAL WELLNESS VISIT, SUBSEQUENT: ICD-10-CM

## 2021-07-22 DIAGNOSIS — M19.90 ARTHRITIS: ICD-10-CM

## 2021-07-22 DIAGNOSIS — E78.2 MIXED HYPERLIPIDEMIA: ICD-10-CM

## 2021-07-22 PROBLEM — Z01.419 ENCOUNTER FOR GYNECOLOGICAL EXAMINATION WITHOUT ABNORMAL FINDING: Status: RESOLVED | Noted: 2019-06-24 | Resolved: 2021-07-22

## 2021-07-22 PROBLEM — E78.5 DYSLIPIDEMIA, GOAL TO BE DETERMINED: Status: RESOLVED | Noted: 2020-06-19 | Resolved: 2021-07-22

## 2021-07-22 PROCEDURE — 1123F ACP DISCUSS/DSCN MKR DOCD: CPT | Performed by: INTERNAL MEDICINE

## 2021-07-22 PROCEDURE — G0439 PPPS, SUBSEQ VISIT: HCPCS | Performed by: INTERNAL MEDICINE

## 2021-07-22 PROCEDURE — 99214 OFFICE O/P EST MOD 30 MIN: CPT | Performed by: INTERNAL MEDICINE

## 2021-07-22 RX ORDER — PREDNISONE 1 MG/1
2.5 TABLET ORAL
COMMUNITY
Start: 2021-07-12 | End: 2021-09-09 | Stop reason: ALTCHOICE
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
## 2021-08-03 ENCOUNTER — TELEPHONE (OUTPATIENT)
Dept: INTERNAL MEDICINE CLINIC | Facility: CLINIC | Age: 66
End: 2021-08-03

## 2021-08-03 NOTE — TELEPHONE ENCOUNTER
Patient states the Rheumatologist is saying she needs to discontinue the cholesterol medication she is taking  It is what's causing the joint and muscle pain and the headaches she has been having  Pt made an appointment for 8/13 to discuss

## 2021-08-13 ENCOUNTER — OFFICE VISIT (OUTPATIENT)
Dept: INTERNAL MEDICINE CLINIC | Facility: CLINIC | Age: 66
End: 2021-08-13
Payer: MEDICARE

## 2021-08-13 VITALS
OXYGEN SATURATION: 97 % | HEIGHT: 67 IN | TEMPERATURE: 98.2 F | RESPIRATION RATE: 12 BRPM | BODY MASS INDEX: 29.38 KG/M2 | HEART RATE: 72 BPM | WEIGHT: 187.2 LBS | DIASTOLIC BLOOD PRESSURE: 82 MMHG | SYSTOLIC BLOOD PRESSURE: 130 MMHG

## 2021-08-13 DIAGNOSIS — M79.642 BILATERAL HAND PAIN: ICD-10-CM

## 2021-08-13 DIAGNOSIS — M17.2 BILATERAL POST-TRAUMATIC OSTEOARTHRITIS OF KNEE: ICD-10-CM

## 2021-08-13 DIAGNOSIS — M19.90 ARTHRITIS: Primary | ICD-10-CM

## 2021-08-13 DIAGNOSIS — R07.2 PRECORDIAL PAIN: ICD-10-CM

## 2021-08-13 DIAGNOSIS — M79.641 BILATERAL HAND PAIN: ICD-10-CM

## 2021-08-13 PROCEDURE — 99214 OFFICE O/P EST MOD 30 MIN: CPT | Performed by: INTERNAL MEDICINE
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
## 2021-08-25 ENCOUNTER — OFFICE VISIT (OUTPATIENT)
Dept: OBGYN CLINIC | Facility: CLINIC | Age: 66
End: 2021-08-25
Payer: MEDICARE

## 2021-08-25 ENCOUNTER — APPOINTMENT (OUTPATIENT)
Dept: RADIOLOGY | Facility: CLINIC | Age: 66
End: 2021-08-25
Payer: MEDICARE

## 2021-08-25 VITALS
BODY MASS INDEX: 29.47 KG/M2 | HEART RATE: 71 BPM | WEIGHT: 183.4 LBS | DIASTOLIC BLOOD PRESSURE: 72 MMHG | SYSTOLIC BLOOD PRESSURE: 122 MMHG | HEIGHT: 66 IN

## 2021-08-25 DIAGNOSIS — M54.41 CHRONIC RIGHT-SIDED LOW BACK PAIN WITH RIGHT-SIDED SCIATICA: ICD-10-CM

## 2021-08-25 DIAGNOSIS — M25.561 CHRONIC PAIN OF BOTH KNEES: ICD-10-CM

## 2021-08-25 DIAGNOSIS — G89.29 CHRONIC RIGHT-SIDED LOW BACK PAIN WITH RIGHT-SIDED SCIATICA: ICD-10-CM

## 2021-08-25 DIAGNOSIS — G89.29 CHRONIC PAIN OF BOTH KNEES: ICD-10-CM

## 2021-08-25 DIAGNOSIS — M25.562 CHRONIC PAIN OF BOTH KNEES: ICD-10-CM

## 2021-08-25 DIAGNOSIS — M17.0 BILATERAL PRIMARY OSTEOARTHRITIS OF KNEE: Primary | ICD-10-CM

## 2021-08-25 PROCEDURE — 99203 OFFICE O/P NEW LOW 30 MIN: CPT | Performed by: ORTHOPAEDIC SURGERY

## 2021-08-25 PROCEDURE — 73564 X-RAY EXAM KNEE 4 OR MORE: CPT
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
## 2021-08-30 ENCOUNTER — APPOINTMENT (OUTPATIENT)
Dept: LAB | Facility: HOSPITAL | Age: 66
End: 2021-08-30
Attending: INTERNAL MEDICINE
Payer: MEDICARE

## 2021-08-30 DIAGNOSIS — R53.82 CHRONIC FATIGUE: ICD-10-CM

## 2021-08-30 LAB
T3 SERPL-MCNC: 0.8 NG/ML (ref 0.6–1.8)
T4 FREE SERPL-MCNC: 1.09 NG/DL (ref 0.76–1.46)
TSH SERPL DL<=0.05 MIU/L-ACNC: 2.56 UIU/ML (ref 0.36–3.74)

## 2021-08-30 PROCEDURE — 84439 ASSAY OF FREE THYROXINE: CPT

## 2021-08-30 PROCEDURE — 84480 ASSAY TRIIODOTHYRONINE (T3): CPT

## 2021-08-30 PROCEDURE — 36415 COLL VENOUS BLD VENIPUNCTURE: CPT

## 2021-08-30 PROCEDURE — 84443 ASSAY THYROID STIM HORMONE: CPT

## 2021-09-01 ENCOUNTER — TELEPHONE (OUTPATIENT)
Dept: INTERNAL MEDICINE CLINIC | Facility: CLINIC | Age: 66
End: 2021-09-01

## 2021-09-01 ENCOUNTER — OFFICE VISIT (OUTPATIENT)
Dept: INTERNAL MEDICINE CLINIC | Facility: CLINIC | Age: 66
End: 2021-09-01
Payer: MEDICARE

## 2021-09-01 VITALS
SYSTOLIC BLOOD PRESSURE: 134 MMHG | TEMPERATURE: 97.5 F | HEIGHT: 66 IN | HEART RATE: 66 BPM | WEIGHT: 187.2 LBS | BODY MASS INDEX: 30.08 KG/M2 | OXYGEN SATURATION: 95 % | DIASTOLIC BLOOD PRESSURE: 80 MMHG

## 2021-09-01 DIAGNOSIS — I10 ESSENTIAL HYPERTENSION: Primary | ICD-10-CM

## 2021-09-01 DIAGNOSIS — M19.90 ARTHRITIS: Primary | ICD-10-CM

## 2021-09-01 PROCEDURE — 99213 OFFICE O/P EST LOW 20 MIN: CPT | Performed by: INTERNAL MEDICINE

## 2021-09-01 NOTE — PROGRESS NOTES
INTERNAL MEDICINE FOLLOW-UP OFFICE VISIT  [facility] BEHAVIORAL MEDICINE AT [facility]    NAME: Aguilar Shabazz  AGE: 73 y o  SEX: female  : 1955   MRN: [id number]    DATE: 2021  TIME: 1:52 PM    Assessment and Plan     Diagnoses and all orders for this visit:    Arthritis  -     Ambulatory referral to Rheumatology; Future  -     diclofenac sodium (VOLTAREN) 50 mg EC tablet; Take 1 tablet (50 mg total) by mouth 2 (two) times a day  -     Comprehensive metabolic panel; Future     was told to continue physical therapy and follow-up with orthopedics     - Counseling Documentation: patient was counseled regarding: diagnostic results, instructions for management, risk factor reductions, prognosis, patient and family education, risks and benefits of treatment options and importance of compliance with treatment  - Medication Side Effects: Adverse side effects of medications were reviewed with the patient/guardian today  Return to office in: 3 months    Chief Complaint     Chief Complaint   Patient presents with    Follow-up     discuss discontinueing medication, getting worse       History of Present Illness     HPI    Patient has been suffering from severe arthritis in her hands, shoulders and knees for almost 6 months now  She was sent to the rheumatologist and had all blood work done  The only thing that was abnormal was the anti double strand DNA and all the other blood work was normal   The rheumatologist started her on 5 mg of prednisone and stopped it in less than a month  She did get some relief from the prednisone but after that she was told to take ibuprofen only  Patient has been suffering from a long time and wants to see another rheumatologist now  She is following up with Orthopedics for the knee pain        The following portions of the patient's history were reviewed and updated as appropriate: allergies, current medications, past family history, past medical history, past social history, past surgical history and problem list     Review of Systems     Review of Systems   Constitutional: Negative for chills, diaphoresis, fatigue and fever  HENT: Negative for congestion, ear discharge, ear pain, hearing loss, postnasal drip, rhinorrhea, sinus pressure, sinus pain, sneezing, sore throat and voice change  Eyes: Negative for pain, discharge, redness and visual disturbance  Respiratory: Negative for cough, chest tightness, shortness of breath and wheezing  Cardiovascular: Negative for chest pain, palpitations and leg swelling  Gastrointestinal: Negative for abdominal distention, abdominal pain, blood in stool, constipation, diarrhea, nausea and vomiting  Endocrine: Negative for cold intolerance, heat intolerance, polydipsia, polyphagia and polyuria  Genitourinary: Negative for dysuria, flank pain, frequency, hematuria and urgency  Musculoskeletal: Positive for arthralgias and joint swelling  Negative for back pain, gait problem, myalgias, neck pain and neck stiffness  Skin: Negative for rash  Neurological: Negative for dizziness, tremors, syncope, facial asymmetry, speech difficulty, weakness, light-headedness, numbness and headaches  Hematological: Does not bruise/bleed easily  Psychiatric/Behavioral: Negative for behavioral problems, confusion and sleep disturbance  The patient is not nervous/anxious  Active Problem List     Patient Active Problem List   Diagnosis    Essential hypertension    Mixed hyperlipidemia    Obesity (BMI 30-39  9)    Bilateral post-traumatic osteoarthritis of knee    Idiopathic peripheral neuropathy    Vitamin D deficiency    Hypercalcemia    Hyperparathyroidism (HCC)    Arthritis    Bilateral hand pain    Chronic pain of both shoulders    Precordial pain       Objective     /80 (BP Location: Left arm, Patient Position: Sitting, Cuff Size: Standard)   Pulse 66   Temp 97 5 °F (36 4 °C) (Temporal)   Ht 5' 6" (1 676 m) Wt 84 9 kg (187 lb 3 2 oz)   LMP  (LMP Unknown)   SpO2 95%   BMI 30 21 kg/m²     Physical Exam  Constitutional:       General: She is not in acute distress  Appearance: She is well-developed  She is not diaphoretic  HENT:      Head: Normocephalic and atraumatic  Right Ear: External ear normal       Left Ear: External ear normal       Nose: Nose normal    Eyes:      General: No scleral icterus  Right eye: No discharge  Left eye: No discharge  Conjunctiva/sclera: Conjunctivae normal    Neck:      Thyroid: No thyromegaly  Vascular: No JVD  Trachea: No tracheal deviation  Cardiovascular:      Rate and Rhythm: Normal rate and regular rhythm  Heart sounds: Normal heart sounds  No murmur heard  No friction rub  No gallop  Pulmonary:      Effort: Pulmonary effort is normal  No respiratory distress  Breath sounds: Normal breath sounds  No wheezing or rales  Chest:      Chest wall: No tenderness  Abdominal:      General: Bowel sounds are normal  There is no distension  Palpations: Abdomen is soft  Tenderness: There is no abdominal tenderness  There is no guarding or rebound  Musculoskeletal:         General: Swelling, tenderness and deformity present  Normal range of motion  Cervical back: Normal range of motion and neck supple  Comments: Has severe tenderness, swelling and deformity of her finger joints and wrist joints  Has pain and swelling of the knee joints and shoulder joints   Lymphadenopathy:      Cervical: No cervical adenopathy  Skin:     General: Skin is warm and dry  Findings: No erythema or rash  Neurological:      Mental Status: She is alert and oriented to person, place, and time  Cranial Nerves: No cranial nerve deficit  Motor: No abnormal muscle tone        Coordination: Coordination normal    Psychiatric:         Judgment: Judgment normal              Current Medications       Current Outpatient Medications:     B Complex Vitamins (B COMPLEX 1 PO), Take by mouth, Disp: , Rfl:     cholecalciferol (VITAMIN D3) 1,000 units tablet, Take 1,000 Units by mouth daily, Disp: , Rfl:     gabapentin (NEURONTIN) 600 MG tablet, Take 1 tablet (600 mg total) by mouth 2 (two) times a day, Disp: 180 tablet, Rfl: 3    hydrochlorothiazide (HYDRODIURIL) 25 mg tablet, Take 1 tablet (25 mg total) by mouth daily, Disp: 90 tablet, Rfl: 3    latanoprost (XALATAN) 0 005 % ophthalmic solution, INSTILL 1 DROP IN BOTH EYES EVERY DAY AT BEDTIME, Disp: , Rfl:     olmesartan (BENICAR) 40 mg tablet, Take 1 tablet (40 mg total) by mouth daily, Disp: 90 tablet, Rfl: 3    rosuvastatin (CRESTOR) 20 MG tablet, Take 1 tablet (20 mg total) by mouth daily, Disp: 90 tablet, Rfl: 3    diclofenac sodium (VOLTAREN) 50 mg EC tablet, Take 1 tablet (50 mg total) by mouth 2 (two) times a day, Disp: 60 tablet, Rfl: 3    predniSONE 5 mg tablet, 2 5 mg  (Patient not taking: Reported on 8/25/2021), Disp: , Rfl:     Health Maintenance     Health Maintenance   Topic Date Due    DTaP,Tdap,and Td Vaccines (1 - Tdap) Never done    Pneumococcal Vaccine: 65+ Years (1 of 1 - PPSV23) Never done    Breast Cancer Screening: Mammogram  06/01/2021    Influenza Vaccine (1) 09/01/2021    BMI: Followup Plan  01/12/2022    DXA SCAN  05/29/2022    Fall Risk  07/22/2022    Depression Screening PHQ  07/22/2022    Medicare Annual Wellness Visit (AWV)  07/22/2022    Colorectal Cancer Screening  08/05/2022    BMI: Adult  08/25/2022    HIV Screening  Completed    Hepatitis C Screening  Completed    COVID-19 Vaccine  Completed    HIB Vaccine  Aged Out    Hepatitis B Vaccine  Aged Out    IPV Vaccine  Aged Out    Hepatitis A Vaccine  Aged Out    Meningococcal ACWY Vaccine  Aged Out    HPV Vaccine  Aged Out     Immunization History   Administered Date(s) Administered    SARS-CoV-2 / COVID-19 mRNA IM (Pfizer-BioNTech) 05/14/2021, 06/04/2021         Janett Corinne [address] 52 of BEHAVIORAL MEDICINE AT [facility]

## 2021-09-01 NOTE — TELEPHONE ENCOUNTER
PT checking out from todays appt w/ Dr Ruperto Delacruz  Would like to have labs in addition to the Sunitha Her[address]is [address] she received today for her November appt       "Whatever labs Dr Ruperto Delacruz needs"    PT Phone  [phone]

## 2021-09-06 ENCOUNTER — HOSPITAL ENCOUNTER (EMERGENCY)
Facility: HOSPITAL | Age: 66
Discharge: HOME | End: 2021-09-06
Attending: EMERGENCY MEDICINE
Payer: MEDICARE

## 2021-09-06 VITALS
RESPIRATION RATE: 18 BRPM | SYSTOLIC BLOOD PRESSURE: 157 MMHG | OXYGEN SATURATION: 96 % | DIASTOLIC BLOOD PRESSURE: 67 MMHG | TEMPERATURE: 98.5 F | HEART RATE: 78 BPM

## 2021-09-06 DIAGNOSIS — M54.50 LOW BACK PAIN: ICD-10-CM

## 2021-09-06 DIAGNOSIS — M54.30 SCIATICA: Primary | ICD-10-CM

## 2021-09-06 PROCEDURE — 99283 EMERGENCY DEPT VISIT LOW MDM: CPT

## 2021-09-06 PROCEDURE — 99284 EMERGENCY DEPT VISIT MOD MDM: CPT | Performed by: EMERGENCY MEDICINE

## 2021-09-06 RX ORDER — PREDNISONE 20 MG/1
40 TABLET ORAL DAILY
Qty: 10 TABLET | Refills: 0 | Status: SHIPPED | OUTPATIENT
Start: 2021-09-06 | End: 2021-09-11

## 2021-09-06 RX ORDER — PREDNISONE 20 MG/1
60 TABLET ORAL ONCE
Status: COMPLETED | OUTPATIENT
Start: 2021-09-06 | End: 2021-09-06

## 2021-09-06 RX ORDER — OXYCODONE HYDROCHLORIDE AND ACETAMINOPHEN 5; 325 MG/1; MG/1
1 TABLET ORAL ONCE
Status: COMPLETED | OUTPATIENT
Start: 2021-09-06 | End: 2021-09-06

## 2021-09-06 RX ORDER — OXYCODONE HYDROCHLORIDE AND ACETAMINOPHEN 5; 325 MG/1; MG/1
1 TABLET ORAL EVERY 6 HOURS PRN
Qty: 20 TABLET | Refills: 0 | Status: SHIPPED | OUTPATIENT
Start: 2021-09-06 | End: 2021-11-01

## 2021-09-06 RX ADMIN — OXYCODONE HYDROCHLORIDE AND ACETAMINOPHEN 1 TABLET: 5; 325 TABLET ORAL at 11:10

## 2021-09-06 RX ADMIN — PREDNISONE 60 MG: 20 TABLET ORAL at 11:10
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
## 2021-09-06 NOTE — DISCHARGE INSTRUCTIONS
Rest  Heat to the area  Follow-up with Physical therapy  Percocet 1 every 6 hours if needed for pain caution narcotics will make a sleepy  Prednisone daily reduce inflammation and swelling  Follow-up through your rheumatologist as planned

## 2021-09-09 ENCOUNTER — TELEPHONE (OUTPATIENT)
Dept: INTERNAL MEDICINE CLINIC | Facility: CLINIC | Age: 66
End: 2021-09-09

## 2021-09-09 ENCOUNTER — OFFICE VISIT (OUTPATIENT)
Dept: OBGYN CLINIC | Facility: CLINIC | Age: 66
End: 2021-09-09
Payer: MEDICARE

## 2021-09-09 ENCOUNTER — APPOINTMENT (OUTPATIENT)
Dept: RADIOLOGY | Facility: CLINIC | Age: 66
End: 2021-09-09
Payer: MEDICARE

## 2021-09-09 VITALS
SYSTOLIC BLOOD PRESSURE: 123 MMHG | DIASTOLIC BLOOD PRESSURE: 72 MMHG | WEIGHT: 180.8 LBS | BODY MASS INDEX: 29.06 KG/M2 | HEART RATE: 61 BPM | HEIGHT: 66 IN | RESPIRATION RATE: 18 BRPM

## 2021-09-09 DIAGNOSIS — G89.29 CHRONIC RIGHT-SIDED LOW BACK PAIN WITH RIGHT-SIDED SCIATICA: Primary | ICD-10-CM

## 2021-09-09 DIAGNOSIS — G89.29 CHRONIC RIGHT-SIDED LOW BACK PAIN WITH RIGHT-SIDED SCIATICA: ICD-10-CM

## 2021-09-09 DIAGNOSIS — M54.41 CHRONIC RIGHT-SIDED LOW BACK PAIN WITH RIGHT-SIDED SCIATICA: ICD-10-CM

## 2021-09-09 DIAGNOSIS — M54.41 CHRONIC RIGHT-SIDED LOW BACK PAIN WITH RIGHT-SIDED SCIATICA: Primary | ICD-10-CM

## 2021-09-09 PROCEDURE — 99213 OFFICE O/P EST LOW 20 MIN: CPT | Performed by: ORTHOPAEDIC SURGERY

## 2021-09-09 PROCEDURE — 72100 X-RAY EXAM L-S SPINE 2/3 VWS: CPT

## 2021-09-09 NOTE — PROGRESS NOTES
Patient Name:  Hayde Zimmerman  MRN:  [id number]    [address] [place] [address] [place]     1  Chronic right-sided low back pain with right-sided sciatica  -     XR spine lumbar 2 or 3 views injury; Future; Expected date: 09/09/2021  -     Ambulatory referral to Pain Management; Future  -     MRI lumbar spine wo contrast; Future; Expected date: 09/09/2021        73year old female with low back pain  X-rays of lumbar spine reviewed in office today with patient  At this time, advised patient to initiate physical therapy for lumbar and core strengthening, stretching bilateral lower extremities, proper lifting mechanics  In regards to medication prescribed by ED< advised patient to call PCP regarding possible taper of steroids as she is currently administering 40mg daily  Will not refill Rx of Percocet at this time; advised patient to continue Voltaren as previously prescribed  Do not administer NSAIDs with prednisone  Take medications with food  Patient verbalized understanding of the above  She has appointment for Rheumatology in November  Additional consult placed today for Pain Management for discussion of possible lumbar injections secondary to persistent radicular symptoms  History of the Present Illness   Hayde Zimmerman is a 73 y o  female with low back and bilateral knee pain  At last appointment, conservative treatment of bilateral knee osteoarthritis and patient requested orders for medial  braces  Per patient and chart, she reported to ED on 09/06/2021 secondary to low back pain; while in ED she was prescribed percocet and prednisone taper  Today, patient reports continued low back pain with radiation into Right lower extremity  She reports she was unable to obtain her Percocet prescription secondary to computer issues at [place]  She has been administering 40mg Prednisone, has two days until prescription completion   Reports difficulty sleeping secondary to Right lower extremity pain radiation  Review of Systems     Review of Systems   Constitutional: Negative for chills and fever  HENT: Negative for congestion  Respiratory: Negative for cough, chest tightness and shortness of breath  Cardiovascular: Negative for chest pain and palpitations  Gastrointestinal: Negative for abdominal pain  Endocrine: Negative for cold intolerance and heat intolerance  Musculoskeletal: Negative for arthralgias, back pain and gait problem  Neurological: Negative for syncope  Psychiatric/Behavioral: Negative for confusion  Physical Exam     /72   Pulse 61   Resp 18   Ht 5' 6" (1 676 m)   Wt 82 kg (180 lb 12 8 oz)   LMP  (LMP Unknown)   BMI 29 18 kg/m²     Lumbar spine: There is no midline tenderness present  There is no paraspinal tenderness  Sensation intact to light touch left L2 through S1 dermatomes  Sensation mildly decreased to light touch right L2 through S1 dermatomes  5/5 strength left iliopsoas, quadriceps, tibialis anterior, extensor hallucis longus, and gastrocsoleus  5/5 strength right iliopsoas, quadriceps, tibialis anterior, extensor hallucis longus, and gastrocsoleus  Negative clonus bilaterally  Negative Babinski bilaterally  Straight leg raise test is positive Right sided  The patient is well perfused distally  Data Review     I have personally reviewed pertinent films in PACS, and my interpretation follows  X-rays taken today 09/09/2021 of lumbar spine demonstrates straightening of lumbar lordosis and multilevel degenerative changes  No acute fractures noted       Social History     Tobacco Use    Smoking status: Never Smoker    Smokeless tobacco: Never Used   Vaping Use    Vaping Use: Never used   Substance Use Topics    Alcohol use: Not Currently     Alcohol/week: 1 0 standard drinks     Types: 1 Glasses of wine per week     Comment: on occasion    Drug use: No           Procedures  None     Lor Alcantara DO

## 2021-09-09 NOTE — TELEPHONE ENCOUNTER
Patient is currently taking Prednisone for 5 days  Saw Ortho doctor today who advised her to contact PCP for advice on how to taper the Prednisone      Advise patient at [phone]

## 2021-09-10 ENCOUNTER — TELEPHONE (OUTPATIENT)
Dept: PHYSICAL THERAPY | Facility: OTHER | Age: 66
End: 2021-09-10

## 2021-09-10 NOTE — TELEPHONE ENCOUNTER
Call placed to the patient per [facility] referral     Call was answered and voices heard in background but no one answered when I asked to speak to the patient  Call was then disconnected  This is the 1st attempt to reach the patient  Will defer per protocol

## 2021-09-24 ENCOUNTER — HOSPITAL ENCOUNTER (OUTPATIENT)
Dept: MRI IMAGING | Facility: HOSPITAL | Age: 66
Discharge: HOME | End: 2021-09-24
Payer: MEDICARE

## 2021-09-24 DIAGNOSIS — G89.29 CHRONIC RIGHT-SIDED LOW BACK PAIN WITH RIGHT-SIDED SCIATICA: ICD-10-CM

## 2021-09-24 DIAGNOSIS — M54.41 CHRONIC RIGHT-SIDED LOW BACK PAIN WITH RIGHT-SIDED SCIATICA: ICD-10-CM

## 2021-09-24 PROCEDURE — 72148 MRI LUMBAR SPINE W/O DYE: CPT

## 2021-09-29 ENCOUNTER — OFFICE VISIT (OUTPATIENT)
Dept: OBGYN CLINIC | Facility: CLINIC | Age: 66
End: 2021-09-29
Payer: MEDICARE

## 2021-09-29 VITALS
HEART RATE: 65 BPM | WEIGHT: 181 LBS | BODY MASS INDEX: 29.09 KG/M2 | SYSTOLIC BLOOD PRESSURE: 126 MMHG | HEIGHT: 66 IN | DIASTOLIC BLOOD PRESSURE: 74 MMHG

## 2021-09-29 DIAGNOSIS — G89.29 CHRONIC RIGHT-SIDED LOW BACK PAIN WITH RIGHT-SIDED SCIATICA: Primary | ICD-10-CM

## 2021-09-29 DIAGNOSIS — M54.41 CHRONIC RIGHT-SIDED LOW BACK PAIN WITH RIGHT-SIDED SCIATICA: Primary | ICD-10-CM

## 2021-09-29 PROCEDURE — 99213 OFFICE O/P EST LOW 20 MIN: CPT | Performed by: ORTHOPAEDIC SURGERY

## 2021-09-29 NOTE — PROGRESS NOTES
Patient Name:  Vidhi Hanna  MRN:  [id number]    [address] [place] [address]     1  Chronic right-sided low back pain with right-sided sciatica  -     Ambulatory referral to Physical Therapy; Future        The patient returned to the office today for follow up of her right sided lumbar spine pain and sciatica, which she states has remained unchanged despite formal physical therapy  The patient had not gone to pain management, as she expressed her desire to discuss other treatment options available to her  MRI of the lumbar spine was reviewed and discussed with the patient, which demonstrated multilevel degenerative spondylosis  The patient was advised that it may be beneficial to try formal aqua therapy at this time  The patient was in agreement and was provided with a script  The patient may also continue with massages and epsom salt soaks as she feels is helpful  The patient was encouraged to go to pain management, as spinal injections may provide her with the symptom relief she has been hoping for  The patient will return to the office in 6-8 weeks for recheck  If her symptoms have remained unchanged at that time, a referral to Dr Darci Martinez may be warranted  History of the Present Illness   Vidhi Hanna is a 73 y o  female presenting to the office for follow up of right sided lumbar spine pain with sciatica  At last office visit on 9/9/2021, the patient was provided with a script to begin formal physical therapy and a referral to pain management  Today the patient states that her pain has remained unchanged  She states that she has been going to physical therapy, her last session being this past Monday, but it does not provide her with relief  The patient admits that she has not gone to pain management, expressing her concerns with epidural injections and wishing instead to discuss further treatment options available to her   The patient states that she has been soaking her feet in epsom salts baths, which provides her with short term relief  She is also considering going for weekly massages  The patient denies any new injury or trauma  She describes the pain as a burning, ripping sensation that begins in the middle of the right buttock, with radiation down the lateral aspect of the right lower extremity  The pain worsens with sitting for long periods of time, and at night while trying to sleep  Review of Systems     Review of Systems   Constitutional: Negative for chills and fever  HENT: Negative for ear pain and sore throat  Eyes: Negative for pain and visual disturbance  Respiratory: Negative for cough and shortness of breath  Cardiovascular: Negative for chest pain and palpitations  Gastrointestinal: Negative for abdominal pain and vomiting  Genitourinary: Negative for dysuria and hematuria  Musculoskeletal: Positive for back pain (Lumbar)  Negative for arthralgias  Skin: Negative for color change and rash  Neurological: Negative for seizures and syncope  All other systems reviewed and are negative  Physical Exam     /74 (BP Location: Right arm, Patient Position: Sitting, Cuff Size: Adult)   Pulse 65   Ht 5' 6" (1 676 m)   Wt 82 1 kg (181 lb)   LMP  (LMP Unknown)   BMI 29 21 kg/m²      Lumbar spine: There is no midline tenderness present  There is no paraspinal tenderness  Sensation intact to light touch right L2 through S1 dermatomes4/5 strength right iliopsoas, quadriceps, tibialis anterior, extensor hallucis longus, and gastrocsoleus  Negative clonus bilaterally  Negative Babinski bilaterally  Straight leg raise test is negative  The patient is well perfused distally  Data Review     I have personally reviewed pertinent films in PACS, and my interpretation follows  MRI of the lumbar spine demonstrates multilevel degenerative spondylosis      Social History     Tobacco Use    Smoking status: Never Smoker    Smokeless tobacco: Never Used   Vaping Use    Vaping Use: Never used   Substance Use Topics    Alcohol use: Not Currently     Alcohol/week: 1 0 standard drinks     Types: 1 Glasses of wine per week     Comment: on occasion    Drug use: No         Procedures  No procedures performed today       Cecy Rosales PA-C

## 2021-10-15 ENCOUNTER — APPOINTMENT (OUTPATIENT)
Dept: LAB | Facility: CLINIC | Age: 66
End: 2021-10-15
Payer: MEDICARE

## 2021-10-15 DIAGNOSIS — M19.90 ARTHRITIS: ICD-10-CM

## 2021-10-15 DIAGNOSIS — I10 ESSENTIAL HYPERTENSION: ICD-10-CM

## 2021-10-15 LAB
ALBUMIN SERPL BCP-MCNC: 3 G/DL (ref 3.5–5)
ALP SERPL-CCNC: 59 U/L (ref 46–116)
ALT SERPL W P-5'-P-CCNC: 23 U/L (ref 12–78)
ANION GAP SERPL CALCULATED.3IONS-SCNC: 4 MMOL/L (ref 4–13)
AST SERPL W P-5'-P-CCNC: 12 U/L (ref 5–45)
BASOPHILS # BLD AUTO: 0.09 THOUSANDS/ΜL (ref 0–0.1)
BASOPHILS NFR BLD AUTO: 1 % (ref 0–1)
BILIRUB SERPL-MCNC: 0.33 MG/DL (ref 0.2–1)
BUN SERPL-MCNC: 34 MG/DL (ref 5–25)
CALCIUM ALBUM COR SERPL-MCNC: 10.3 MG/DL (ref 8.3–10.1)
CALCIUM SERPL-MCNC: 9.5 MG/DL (ref 8.3–10.1)
CHLORIDE SERPL-SCNC: 107 MMOL/L (ref 100–108)
CHOLEST SERPL-MCNC: 166 MG/DL (ref 50–200)
CO2 SERPL-SCNC: 27 MMOL/L (ref 21–32)
CREAT SERPL-MCNC: 0.87 MG/DL (ref 0.6–1.3)
EOSINOPHIL # BLD AUTO: 0.3 THOUSAND/ΜL (ref 0–0.61)
EOSINOPHIL NFR BLD AUTO: 3 % (ref 0–6)
ERYTHROCYTE [DISTWIDTH] IN BLOOD BY AUTOMATED COUNT: 12.8 % (ref 11.6–15.1)
GFR SERPL CREATININE-BSD FRML MDRD: 70 ML/MIN/1.73SQ M
GLUCOSE P FAST SERPL-MCNC: 88 MG/DL (ref 65–99)
HCT VFR BLD AUTO: 32.4 % (ref 34.8–46.1)
HDLC SERPL-MCNC: 43 MG/DL
HGB BLD-MCNC: 10.3 G/DL (ref 11.5–15.4)
IMM GRANULOCYTES # BLD AUTO: 0.03 THOUSAND/UL (ref 0–0.2)
IMM GRANULOCYTES NFR BLD AUTO: 0 % (ref 0–2)
LDLC SERPL CALC-MCNC: 94 MG/DL (ref 0–100)
LYMPHOCYTES # BLD AUTO: 1.46 THOUSANDS/ΜL (ref 0.6–4.47)
LYMPHOCYTES NFR BLD AUTO: 15 % (ref 14–44)
MCH RBC QN AUTO: 27.8 PG (ref 26.8–34.3)
MCHC RBC AUTO-ENTMCNC: 31.8 G/DL (ref 31.4–37.4)
MCV RBC AUTO: 88 FL (ref 82–98)
MONOCYTES # BLD AUTO: 1.02 THOUSAND/ΜL (ref 0.17–1.22)
MONOCYTES NFR BLD AUTO: 11 % (ref 4–12)
NEUTROPHILS # BLD AUTO: 6.64 THOUSANDS/ΜL (ref 1.85–7.62)
NEUTS SEG NFR BLD AUTO: 70 % (ref 43–75)
NONHDLC SERPL-MCNC: 123 MG/DL
NRBC BLD AUTO-RTO: 0 /100 WBCS
PLATELET # BLD AUTO: 538 THOUSANDS/UL (ref 149–390)
PMV BLD AUTO: 10.1 FL (ref 8.9–12.7)
POTASSIUM SERPL-SCNC: 3.9 MMOL/L (ref 3.5–5.3)
PROT SERPL-MCNC: 8 G/DL (ref 6.4–8.2)
RBC # BLD AUTO: 3.7 MILLION/UL (ref 3.81–5.12)
SODIUM SERPL-SCNC: 138 MMOL/L (ref 136–145)
TRIGL SERPL-MCNC: 144 MG/DL
TSH SERPL DL<=0.05 MIU/L-ACNC: 2.01 UIU/ML (ref 0.36–3.74)
WBC # BLD AUTO: 9.54 THOUSAND/UL (ref 4.31–10.16)

## 2021-10-15 PROCEDURE — 85025 COMPLETE CBC W/AUTO DIFF WBC: CPT

## 2021-10-15 PROCEDURE — 84443 ASSAY THYROID STIM HORMONE: CPT

## 2021-10-15 PROCEDURE — 80061 LIPID PANEL: CPT

## 2021-10-15 PROCEDURE — 80053 COMPREHEN METABOLIC PANEL: CPT

## 2021-10-15 PROCEDURE — 36415 COLL VENOUS BLD VENIPUNCTURE: CPT

## 2021-10-19 ENCOUNTER — CONSULT (OUTPATIENT)
Dept: PAIN MEDICINE | Facility: CLINIC | Age: 66
End: 2021-10-19
Payer: MEDICARE

## 2021-10-19 VITALS
HEART RATE: 86 BPM | HEIGHT: 66 IN | SYSTOLIC BLOOD PRESSURE: 130 MMHG | DIASTOLIC BLOOD PRESSURE: 73 MMHG | BODY MASS INDEX: 29.25 KG/M2 | WEIGHT: 182 LBS

## 2021-10-19 DIAGNOSIS — M54.41 CHRONIC RIGHT-SIDED LOW BACK PAIN WITH RIGHT-SIDED SCIATICA: Primary | ICD-10-CM

## 2021-10-19 DIAGNOSIS — M25.50 POLYARTHRALGIA: ICD-10-CM

## 2021-10-19 DIAGNOSIS — G89.29 CHRONIC RIGHT-SIDED LOW BACK PAIN WITH RIGHT-SIDED SCIATICA: Primary | ICD-10-CM

## 2021-10-19 PROCEDURE — 99204 OFFICE O/P NEW MOD 45 MIN: CPT | Performed by: STUDENT IN AN ORGANIZED HEALTH CARE EDUCATION/TRAINING PROGRAM

## 2021-10-19 RX ORDER — MELOXICAM 7.5 MG/1
7.5 TABLET ORAL DAILY PRN
Qty: 30 TABLET | Refills: 0 | Status: SHIPPED | OUTPATIENT
Start: 2021-10-19 | End: 2021-11-11 | Stop reason: ALTCHOICE

## 2021-10-20 ENCOUNTER — TELEPHONE (OUTPATIENT)
Dept: PAIN MEDICINE | Facility: CLINIC | Age: 66
End: 2021-10-20

## 2021-10-21 ENCOUNTER — HOSPITAL ENCOUNTER (OUTPATIENT)
Dept: RADIOLOGY | Facility: CLINIC | Age: 66
Discharge: HOME | End: 2021-10-21
Attending: STUDENT IN AN ORGANIZED HEALTH CARE EDUCATION/TRAINING PROGRAM
Payer: MEDICARE

## 2021-10-21 VITALS
SYSTOLIC BLOOD PRESSURE: 126 MMHG | DIASTOLIC BLOOD PRESSURE: 72 MMHG | HEART RATE: 74 BPM | OXYGEN SATURATION: 96 % | RESPIRATION RATE: 20 BRPM | TEMPERATURE: 97.8 F

## 2021-10-21 DIAGNOSIS — G89.29 CHRONIC RIGHT-SIDED LOW BACK PAIN WITH RIGHT-SIDED SCIATICA: ICD-10-CM

## 2021-10-21 DIAGNOSIS — M54.41 CHRONIC RIGHT-SIDED LOW BACK PAIN WITH RIGHT-SIDED SCIATICA: ICD-10-CM

## 2021-10-21 PROCEDURE — 62323 NJX INTERLAMINAR LMBR/SAC: CPT | Performed by: STUDENT IN AN ORGANIZED HEALTH CARE EDUCATION/TRAINING PROGRAM

## 2021-10-21 RX ORDER — METHYLPREDNISOLONE ACETATE 80 MG/ML
80 INJECTION, SUSPENSION INTRA-ARTICULAR; INTRALESIONAL; INTRAMUSCULAR; PARENTERAL; SOFT TISSUE ONCE
Status: COMPLETED | OUTPATIENT
Start: 2021-10-21 | End: 2021-10-21

## 2021-10-21 RX ADMIN — METHYLPREDNISOLONE ACETATE 80 MG: 80 INJECTION, SUSPENSION INTRA-ARTICULAR; INTRALESIONAL; INTRAMUSCULAR; PARENTERAL; SOFT TISSUE at 15:05

## 2021-10-21 RX ADMIN — IOHEXOL 1 ML: 300 INJECTION, SOLUTION INTRAVENOUS at 15:05

## 2021-10-28 ENCOUNTER — TELEPHONE (OUTPATIENT)
Dept: PAIN MEDICINE | Facility: CLINIC | Age: 66
End: 2021-10-28

## 2021-11-01 ENCOUNTER — APPOINTMENT (OUTPATIENT)
Dept: LAB | Facility: CLINIC | Age: 66
End: 2021-11-01
Payer: MEDICARE

## 2021-11-01 ENCOUNTER — OFFICE VISIT (OUTPATIENT)
Dept: INTERNAL MEDICINE CLINIC | Facility: CLINIC | Age: 66
End: 2021-11-01
Payer: MEDICARE

## 2021-11-01 VITALS
OXYGEN SATURATION: 95 % | SYSTOLIC BLOOD PRESSURE: 124 MMHG | DIASTOLIC BLOOD PRESSURE: 70 MMHG | HEART RATE: 86 BPM | TEMPERATURE: 98.1 F | WEIGHT: 175 LBS | BODY MASS INDEX: 28.12 KG/M2 | HEIGHT: 66 IN

## 2021-11-01 DIAGNOSIS — E78.2 MIXED HYPERLIPIDEMIA: ICD-10-CM

## 2021-11-01 DIAGNOSIS — G60.9 IDIOPATHIC PERIPHERAL NEUROPATHY: ICD-10-CM

## 2021-11-01 DIAGNOSIS — E55.9 VITAMIN D DEFICIENCY: ICD-10-CM

## 2021-11-01 DIAGNOSIS — I10 ESSENTIAL HYPERTENSION: Primary | ICD-10-CM

## 2021-11-01 DIAGNOSIS — D50.9 IRON DEFICIENCY ANEMIA, UNSPECIFIED IRON DEFICIENCY ANEMIA TYPE: ICD-10-CM

## 2021-11-01 DIAGNOSIS — M79.641 BILATERAL HAND PAIN: ICD-10-CM

## 2021-11-01 DIAGNOSIS — M79.642 BILATERAL HAND PAIN: ICD-10-CM

## 2021-11-01 LAB
25(OH)D3 SERPL-MCNC: 44.2 NG/ML (ref 30–100)
ALBUMIN SERPL BCP-MCNC: 3.3 G/DL (ref 3.5–5)
ALP SERPL-CCNC: 63 U/L (ref 46–116)
ALT SERPL W P-5'-P-CCNC: 22 U/L (ref 12–78)
ANION GAP SERPL CALCULATED.3IONS-SCNC: 4 MMOL/L (ref 4–13)
AST SERPL W P-5'-P-CCNC: 12 U/L (ref 5–45)
BASOPHILS # BLD AUTO: 0.07 THOUSANDS/ΜL (ref 0–0.1)
BASOPHILS NFR BLD AUTO: 1 % (ref 0–1)
BILIRUB SERPL-MCNC: 0.46 MG/DL (ref 0.2–1)
BUN SERPL-MCNC: 19 MG/DL (ref 5–25)
CALCIUM ALBUM COR SERPL-MCNC: 10.4 MG/DL (ref 8.3–10.1)
CALCIUM SERPL-MCNC: 9.8 MG/DL (ref 8.3–10.1)
CHLORIDE SERPL-SCNC: 107 MMOL/L (ref 100–108)
CHOLEST SERPL-MCNC: 152 MG/DL (ref 50–200)
CO2 SERPL-SCNC: 26 MMOL/L (ref 21–32)
CREAT SERPL-MCNC: 0.9 MG/DL (ref 0.6–1.3)
EOSINOPHIL # BLD AUTO: 0.28 THOUSAND/ΜL (ref 0–0.61)
EOSINOPHIL NFR BLD AUTO: 3 % (ref 0–6)
ERYTHROCYTE [DISTWIDTH] IN BLOOD BY AUTOMATED COUNT: 13.9 % (ref 11.6–15.1)
GFR SERPL CREATININE-BSD FRML MDRD: 67 ML/MIN/1.73SQ M
GLUCOSE P FAST SERPL-MCNC: 84 MG/DL (ref 65–99)
HCT VFR BLD AUTO: 34.9 % (ref 34.8–46.1)
HDLC SERPL-MCNC: 50 MG/DL
HGB BLD-MCNC: 11 G/DL (ref 11.5–15.4)
IMM GRANULOCYTES # BLD AUTO: 0.03 THOUSAND/UL (ref 0–0.2)
IMM GRANULOCYTES NFR BLD AUTO: 0 % (ref 0–2)
LDLC SERPL CALC-MCNC: 73 MG/DL (ref 0–100)
LYMPHOCYTES # BLD AUTO: 1.45 THOUSANDS/ΜL (ref 0.6–4.47)
LYMPHOCYTES NFR BLD AUTO: 15 % (ref 14–44)
MCH RBC QN AUTO: 28 PG (ref 26.8–34.3)
MCHC RBC AUTO-ENTMCNC: 31.5 G/DL (ref 31.4–37.4)
MCV RBC AUTO: 89 FL (ref 82–98)
MONOCYTES # BLD AUTO: 0.89 THOUSAND/ΜL (ref 0.17–1.22)
MONOCYTES NFR BLD AUTO: 9 % (ref 4–12)
NEUTROPHILS # BLD AUTO: 6.83 THOUSANDS/ΜL (ref 1.85–7.62)
NEUTS SEG NFR BLD AUTO: 72 % (ref 43–75)
NONHDLC SERPL-MCNC: 102 MG/DL
NRBC BLD AUTO-RTO: 0 /100 WBCS
PLATELET # BLD AUTO: 423 THOUSANDS/UL (ref 149–390)
PMV BLD AUTO: 10.5 FL (ref 8.9–12.7)
POTASSIUM SERPL-SCNC: 4 MMOL/L (ref 3.5–5.3)
PROT SERPL-MCNC: 8.2 G/DL (ref 6.4–8.2)
RBC # BLD AUTO: 3.93 MILLION/UL (ref 3.81–5.12)
SODIUM SERPL-SCNC: 137 MMOL/L (ref 136–145)
TRIGL SERPL-MCNC: 147 MG/DL
TSH SERPL DL<=0.05 MIU/L-ACNC: 2.4 UIU/ML (ref 0.36–3.74)
WBC # BLD AUTO: 9.55 THOUSAND/UL (ref 4.31–10.16)

## 2021-11-01 PROCEDURE — 84443 ASSAY THYROID STIM HORMONE: CPT

## 2021-11-01 PROCEDURE — 80053 COMPREHEN METABOLIC PANEL: CPT

## 2021-11-01 PROCEDURE — 80061 LIPID PANEL: CPT

## 2021-11-01 PROCEDURE — 82306 VITAMIN D 25 HYDROXY: CPT

## 2021-11-01 PROCEDURE — 36415 COLL VENOUS BLD VENIPUNCTURE: CPT

## 2021-11-01 PROCEDURE — 99214 OFFICE O/P EST MOD 30 MIN: CPT | Performed by: INTERNAL MEDICINE

## 2021-11-01 PROCEDURE — 85025 COMPLETE CBC W/AUTO DIFF WBC: CPT

## 2021-11-01 RX ORDER — HYDROCHLOROTHIAZIDE 25 MG/1
25 TABLET ORAL DAILY
Qty: 90 TABLET | Refills: 3 | Status: SHIPPED | OUTPATIENT
Start: 2021-11-01

## 2021-11-05 NOTE — TELEPHONE ENCOUNTER
STILL PAIN RADIATING DOWN THE RIGHT leg past the knee, would be candidate for repeat injection from transforaminal approach atright L4 and L5

## 2021-11-05 NOTE — TELEPHONE ENCOUNTER
There is a little bit relief, but she still has problems. The very sharp pain is gone.     Her pain level is a 6-7/10. Please have a nurse a follow up pt, about next steps.    CB: [phone]

## 2021-11-05 NOTE — TELEPHONE ENCOUNTER
Please advise, pt would like to know next step, she is 2 weeks post procedure. Schedule ov for re-eval?

## 2021-11-05 NOTE — TELEPHONE ENCOUNTER
S/w pt. Pt states the pain still radiates through right leg to ankle. Pt agreeable to repeat injection.  Please place order

## 2021-11-11 ENCOUNTER — OFFICE VISIT (OUTPATIENT)
Dept: RHEUMATOLOGY | Facility: CLINIC | Age: 66
End: 2021-11-11
Payer: MEDICARE

## 2021-11-11 VITALS
HEIGHT: 66 IN | DIASTOLIC BLOOD PRESSURE: 74 MMHG | BODY MASS INDEX: 28.12 KG/M2 | SYSTOLIC BLOOD PRESSURE: 116 MMHG | WEIGHT: 175 LBS

## 2021-11-11 DIAGNOSIS — M25.50 POLYARTHRALGIA: Primary | ICD-10-CM

## 2021-11-11 DIAGNOSIS — M15.9 PRIMARY OSTEOARTHRITIS INVOLVING MULTIPLE JOINTS: ICD-10-CM

## 2021-11-11 DIAGNOSIS — M25.541 ARTHRALGIA OF BOTH HANDS: ICD-10-CM

## 2021-11-11 DIAGNOSIS — M25.542 ARTHRALGIA OF BOTH HANDS: ICD-10-CM

## 2021-11-11 DIAGNOSIS — M19.90 ARTHRITIS: ICD-10-CM

## 2021-11-11 PROCEDURE — 99205 OFFICE O/P NEW HI 60 MIN: CPT | Performed by: INTERNAL MEDICINE

## 2021-11-16 ENCOUNTER — HOSPITAL ENCOUNTER (OUTPATIENT)
Dept: RADIOLOGY | Facility: CLINIC | Age: 66
Discharge: HOME | End: 2021-11-16
Attending: STUDENT IN AN ORGANIZED HEALTH CARE EDUCATION/TRAINING PROGRAM | Admitting: STUDENT IN AN ORGANIZED HEALTH CARE EDUCATION/TRAINING PROGRAM
Payer: MEDICARE

## 2021-11-16 VITALS
DIASTOLIC BLOOD PRESSURE: 77 MMHG | TEMPERATURE: 97.4 F | SYSTOLIC BLOOD PRESSURE: 135 MMHG | OXYGEN SATURATION: 98 % | HEART RATE: 64 BPM | RESPIRATION RATE: 20 BRPM

## 2021-11-16 DIAGNOSIS — M54.16 LUMBAR RADICULOPATHY: ICD-10-CM

## 2021-11-16 RX ORDER — PAPAVERINE HCL 150 MG
20 CAPSULE, EXTENDED RELEASE ORAL ONCE
Status: COMPLETED | OUTPATIENT
Start: 2021-11-16 | End: 2021-11-16

## 2021-11-16 RX ORDER — BUPIVACAINE HCL/PF 2.5 MG/ML
2 VIAL (ML) INJECTION ONCE
Status: COMPLETED | OUTPATIENT
Start: 2021-11-16 | End: 2021-11-16

## 2021-11-16 RX ADMIN — Medication 2 ML: at 10:53

## 2021-11-16 RX ADMIN — IOHEXOL 1 ML: 300 INJECTION, SOLUTION INTRAVENOUS at 10:51

## 2021-11-16 RX ADMIN — DEXAMETHASONE SODIUM PHOSPHATE 20 MG: 10 INJECTION, SOLUTION INTRAMUSCULAR; INTRAVENOUS at 10:53

## 2021-11-18 ENCOUNTER — OFFICE VISIT (OUTPATIENT)
Dept: GASTROENTEROLOGY | Facility: CLINIC | Age: 66
End: 2021-11-18
Payer: MEDICARE

## 2021-11-18 VITALS
BODY MASS INDEX: 27.8 KG/M2 | HEART RATE: 67 BPM | HEIGHT: 66 IN | WEIGHT: 173 LBS | SYSTOLIC BLOOD PRESSURE: 132 MMHG | DIASTOLIC BLOOD PRESSURE: 72 MMHG

## 2021-11-18 DIAGNOSIS — D62 ANEMIA DUE TO ACUTE BLOOD LOSS: Primary | ICD-10-CM

## 2021-11-18 PROCEDURE — 99214 OFFICE O/P EST MOD 30 MIN: CPT | Performed by: INTERNAL MEDICINE

## 2021-11-23 ENCOUNTER — TELEPHONE (OUTPATIENT)
Dept: PAIN MEDICINE | Facility: CLINIC | Age: 66
End: 2021-11-23

## 2021-12-06 NOTE — TELEPHONE ENCOUNTER
Pt returned call in with 75-80 % improvement and pain level is a 1/10.      Please be advised thank you.  Pt can be reached @ [phone]

## 2021-12-09 ENCOUNTER — OFFICE VISIT (OUTPATIENT)
Dept: OBGYN CLINIC | Facility: CLINIC | Age: 66
End: 2021-12-09
Payer: MEDICARE

## 2021-12-09 VITALS
WEIGHT: 173 LBS | HEART RATE: 73 BPM | HEIGHT: 66 IN | DIASTOLIC BLOOD PRESSURE: 76 MMHG | SYSTOLIC BLOOD PRESSURE: 122 MMHG | BODY MASS INDEX: 27.8 KG/M2

## 2021-12-09 DIAGNOSIS — M17.0 BILATERAL PRIMARY OSTEOARTHRITIS OF KNEE: Primary | ICD-10-CM

## 2021-12-09 PROCEDURE — 20610 DRAIN/INJ JOINT/BURSA W/O US: CPT | Performed by: ORTHOPAEDIC SURGERY

## 2021-12-09 PROCEDURE — 99213 OFFICE O/P EST LOW 20 MIN: CPT | Performed by: ORTHOPAEDIC SURGERY

## 2021-12-09 RX ORDER — BUPIVACAINE HYDROCHLORIDE 2.5 MG/ML
2 INJECTION, SOLUTION INFILTRATION; PERINEURAL
Status: COMPLETED | OUTPATIENT
Start: 2021-12-09 | End: 2021-12-09

## 2021-12-09 RX ORDER — LIDOCAINE HYDROCHLORIDE 10 MG/ML
1 INJECTION, SOLUTION INFILTRATION; PERINEURAL
Status: COMPLETED | OUTPATIENT
Start: 2021-12-09 | End: 2021-12-09

## 2021-12-09 RX ORDER — METHYLPREDNISOLONE ACETATE 40 MG/ML
2 INJECTION, SUSPENSION INTRA-ARTICULAR; INTRALESIONAL; INTRAMUSCULAR; SOFT TISSUE
Status: COMPLETED | OUTPATIENT
Start: 2021-12-09 | End: 2021-12-09

## 2021-12-09 RX ADMIN — BUPIVACAINE HYDROCHLORIDE 2 ML: 2.5 INJECTION, SOLUTION INFILTRATION; PERINEURAL at 13:15

## 2021-12-09 RX ADMIN — METHYLPREDNISOLONE ACETATE 2 ML: 40 INJECTION, SUSPENSION INTRA-ARTICULAR; INTRALESIONAL; INTRAMUSCULAR; SOFT TISSUE at 13:15

## 2021-12-09 RX ADMIN — LIDOCAINE HYDROCHLORIDE 1 ML: 10 INJECTION, SOLUTION INFILTRATION; PERINEURAL at 13:15

## 2021-12-20 ENCOUNTER — APPOINTMENT (OUTPATIENT)
Dept: LAB | Facility: HOSPITAL | Age: 66
End: 2021-12-20
Payer: MEDICARE

## 2021-12-20 DIAGNOSIS — D62 ANEMIA DUE TO ACUTE BLOOD LOSS: ICD-10-CM

## 2021-12-20 LAB
ERYTHROCYTE [DISTWIDTH] IN BLOOD BY AUTOMATED COUNT: 14.9 % (ref 11.6–15.1)
HCT VFR BLD AUTO: 37.4 % (ref 34.8–46.1)
HGB BLD-MCNC: 11.9 G/DL (ref 11.5–15.4)
MCH RBC QN AUTO: 27.5 PG (ref 26.8–34.3)
MCHC RBC AUTO-ENTMCNC: 31.8 G/DL (ref 31.4–37.4)
MCV RBC AUTO: 86 FL (ref 82–98)
PLATELET # BLD AUTO: 390 THOUSANDS/UL (ref 149–390)
PMV BLD AUTO: 10.3 FL (ref 8.9–12.7)
RBC # BLD AUTO: 4.33 MILLION/UL (ref 3.81–5.12)
WBC # BLD AUTO: 6.78 THOUSAND/UL (ref 4.31–10.16)

## 2021-12-20 PROCEDURE — 36415 COLL VENOUS BLD VENIPUNCTURE: CPT

## 2021-12-20 PROCEDURE — 85027 COMPLETE CBC AUTOMATED: CPT

## 2022-01-03 DIAGNOSIS — M17.0 BILATERAL PRIMARY OSTEOARTHRITIS OF KNEE: Primary | ICD-10-CM

## 2022-01-03 DIAGNOSIS — E78.2 MIXED HYPERLIPIDEMIA: ICD-10-CM

## 2022-01-03 RX ORDER — ROSUVASTATIN CALCIUM 20 MG/1
TABLET, COATED ORAL
Qty: 90 TABLET | Refills: 3 | Status: SHIPPED | OUTPATIENT
Start: 2022-01-03 | End: 2022-03-07

## 2022-01-10 ENCOUNTER — TELEPHONE (OUTPATIENT)
Dept: INTERNAL MEDICINE CLINIC | Facility: CLINIC | Age: 67
End: 2022-01-10

## 2022-01-10 NOTE — TELEPHONE ENCOUNTER
Patient is asking for a script for Amoxicillin  She states she is congested with sinus headache and discolored phlegm       Send to [facility] [address] [place] [address]

## 2022-01-11 ENCOUNTER — TELEMEDICINE (OUTPATIENT)
Dept: INTERNAL MEDICINE CLINIC | Facility: CLINIC | Age: 67
End: 2022-01-11
Payer: MEDICARE

## 2022-01-11 DIAGNOSIS — J01.00 ACUTE NON-RECURRENT MAXILLARY SINUSITIS: Primary | ICD-10-CM

## 2022-01-11 DIAGNOSIS — B34.9 VIRAL INFECTION, UNSPECIFIED: ICD-10-CM

## 2022-01-11 PROCEDURE — 99213 OFFICE O/P EST LOW 20 MIN: CPT | Performed by: NURSE PRACTITIONER

## 2022-01-11 RX ORDER — AMOXICILLIN 875 MG/1
875 TABLET, COATED ORAL 2 TIMES DAILY
Qty: 20 TABLET | Refills: 0 | Status: SHIPPED | OUTPATIENT
Start: 2022-01-11 | End: 2022-01-21
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
## 2022-01-11 NOTE — PATIENT INSTRUCTIONS
COVID-19 (Coronavirus Disease 2019)   AMBULATORY CARE:   Coronavirus disease 2019 (COVID-19)  is the disease caused by a coronavirus first discovered in December 2019  Coronaviruses generally cause upper respiratory (nose, throat, and lung) infections, such as a cold  The new virus spreads quickly and easily  The virus can be spread starting 2 days before symptoms even begin  The virus has also changed into several new forms (called variants) since it was discovered  The variants may be more contagious (easily spread) than the original form  Some may also cause more severe illness than others  It is important to follow local, national, and worldwide measures to protect yourself and others from infection  Signs and symptoms of COVID-19  may not develop  Signs and symptoms that develop usually start about 5 days after infection but can take 2 to 14 days  You may feel like you have the flu or a bad cold  Some signs and symptoms go away in a few days  Others can last weeks, months, or possibly years  Information on COVID-19 is still being learned  Tell your healthcare provider if you think you were infected but develop signs or symptoms not listed below:  · A cough    · Shortness of breath or trouble breathing that may become severe    · A fever of at least 100 4°F, or 38°C (may be lower in adults 65 or older)    · Chills that might include shaking    · Muscle pain, body aches, or a headache    · A sore throat    · Suddenly not being able to taste or smell anything    · Feeling mentally and physically tired (fatigue)    · Congestion (stuffy head and nose), or a runny nose    · Diarrhea, nausea, or vomiting    If you think you or someone you know may be infected:  Do the following to protect others:  · If emergency care is needed,  tell the  about the possible infection, or call ahead and tell the emergency department  · Call a healthcare provider  for instructions if symptoms are mild   Anyone who may be infected should not  arrive without calling first  The provider will need to protect staff members and other patients  · The person who may be infected needs to wear a face covering  while getting medical care  This will help lower the risk of infecting others  Coverings are not used for anyone who is younger than 2 years, has breathing problems, or cannot remove it  The provider can give you instructions for anyone who cannot wear a covering  Call your local emergency number (911 in the [address] [facility]) or an emergency department if:   · You have trouble breathing or shortness of breath at rest     · You have chest pain or pressure that lasts longer than 5 minutes  · You become confused or hard to wake  · Your lips or face are blue  · You have a fever of 104°F (40°C) or higher  Call your doctor if:   · You do not  have symptoms of COVID-19 but had close physical contact within 14 days with someone who tested positive  · You have questions or concerns about your condition or care  How COVID-19 is diagnosed: If you think you have COVID-19, call your healthcare provider  He or she will tell you what to do based on your symptoms and testing guidelines in your area  In general, the following may be used:  · A viral test  shows if you have a current infection  Samples are taken from your nose and throat, usually with swabs  You may need to wait several days to get the test results  Your healthcare provider will tell you how to get your results  You will need to quarantine (stay physically away from others) until you get your results  If results show you have COVID-19, you will need to continue until you are well  Your provider or other health official may give you more directions  You will also need to prevent another infection until it is known if you can get COVID-19 again  · An antibody test  shows if you had a past infection   Blood samples are used for this test  Antibodies are made by your immune system to attack the virus that causes COVID-19  Antibodies will form 1 to 3 weeks after you are infected  It is not known if antibodies prevent a second infection, or for how long a person might be protected  If you have antibodies, you will still need to be careful around others until more is known  · CT scans or x-rays  may be used to check for signs of pneumonia  The 2019 coronavirus causes a specific kind of pneumonia, usually in both lungs  The pictures may also be used to check for health problems in other parts of your body  Treatment  such as monoclonal antibodies and convalescent plasma have emergency use authorization (EUA)  This means they may be given only to patients who are hospitalized with severe signs and symptoms  The following may be used to manage your symptoms:  · Mild symptoms  may get better on their own  If you do not need to be treated in a hospital, you will be given instructions to use at home  Your condition will be closely monitored  You will need to watch for worsening symptoms and seek immediate care if needed  Talk to your healthcare provider about the following:    ? Decongestants  help reduce nasal congestion and help you breathe more easily  If you take decongestant pills, they may make you feel restless or cause problems with your sleep  Do not use decongestant sprays for more than a few days  ? Cough suppressants  help reduce coughing  Ask your healthcare provider which type of cough medicine is best for you  ? To soothe a sore throat,  gargle with warm salt water, or use throat lozenges or a throat spray  Drink more liquids to thin and loosen mucus and to prevent dehydration  ? NSAIDs or acetaminophen  can help lower a fever and relieve body aches or a headache  Follow directions  If not taken correctly, NSAIDs can cause kidney damage and acetaminophen can cause liver damage      · Severe or life-threatening symptoms  are treated in the hospital  You may need a combination of the following:    ? Medicines  may be given to lower or prevent inflammation or to fight the virus  You may also need blood thinners to prevent or treat blood clots  If you have a deep vein thrombosis (DVT) or pulmonary embolism (PE), you may need to keep using blood thinners for 3 months  ? Extra oxygen  may be given if you have respiratory failure  This means your lungs cannot get enough oxygen into your blood and out to your organs  Extra oxygen can help prevent organ failure  ? A ventilator  may be used to help you breathe  What you need to know about health problems the virus may cause:  Serious health problems may improve or continue for weeks, months, and possibly years  Health problems that continue may be called long COVID  Anyone can develop serious problems from this virus, but your risk is higher if you are 72 or older  A weak immune system, diabetes, or a heart or lung condition can also increase your risk  Your risk is also higher if you are a current or former cigarette smoker  COVID-19 can lead to any of the following:  · Multisymptom inflammatory syndrome in adults (MIS-A) or in children (MIS-C), causing inflammation in the heart, digestive system, skin, or brain    · Serious lower respiratory conditions, such as pneumonia or acute respiratory distress syndrome (ARDS)    · Blood vessel damage, leading to blood clots    · Organ damage from a lack of oxygen or from blood clots    · Sleep problems    · Problems thinking clearly, remembering information, or concentrating    · Mood changes, depression, or anxiety    What you need to know about COVID-19 vaccines:  Get a vaccine even if you already had COVID-19  · COVID-19 vaccines are given as a shot in 1 or 2 doses  Some vaccines have emergency use authorization (EUA)  An EUA means the vaccine is not approved but is given because the benefits outweigh the risks   A 2-dose vaccine is fully approved for use in those 16 years or older  This vaccine also has an EUA for adolescents 12 to 15 years  Your healthcare provider can help you understand the benefits and risks  · A third dose is recommended for adults with a weakened immune system who get a 2-dose vaccine  The third dose is given at least 28 days after the second  · Even after you get the vaccine, continue social distancing and other measures  Experts are still learning how well the vaccines work to prevent infection, transmission, and severe illness  Although rare, you can become infected after you get the vaccine  You may also be able to pass the virus to others without knowing you are infected  · After you get the vaccine, check local, national, and international travel rules  Check to see if you need to be tested before you travel  You may also need to quarantine after you return  Some countries require proof of a negative test before you leave  You should also be tested 3 to 5 days after you return from another country  How the 2019 coronavirus spreads: The following are ways the virus is thought to spread, but more information may be coming:  · Droplets are the main way all coronaviruses spread  The virus travels in droplets that form when a person talks, coughs, or sneezes  The droplets can also float in the air for minutes or hours  Infection happens when you breathe in the droplets or get them in your eyes or nose  Close personal contact with an infected person increases your risk for infection  This means being within 6 feet (2 meters) of the person for at least 15 minutes over 24 hours  · Person-to-person contact can spread the virus  For example, a person with the virus on his or her hands can spread it by shaking hands with someone  · The virus can stay on objects and surfaces for a short time  You may become infected by touching the object or surface and then touching your eyes or mouth      · An infected animal may be able to infect a person who touches it  This may happen at live markets or on a farm  Help lower the risk for COVID-19:  The best way to prevent infection is to avoid anyone who is infected, but this can be hard to do  An infected person can spread the virus before signs or symptoms begin, or even if signs or symptoms never develop  The following can help lower the risk for infection:      · Wash your hands often throughout the day  Use soap and water  Rub your soapy hands together, lacing your fingers, for at least 20 seconds  Rinse with warm, running water  Dry your hands with a clean towel or paper towel  Use hand  that contains alcohol if soap and water are not available  Teach children how to wash their hands and use hand   · Cover sneezes and coughs  Turn your face away and cover your mouth and nose with a tissue  Throw the tissue away  Use the bend of your arm if a tissue is not available  Then wash your hands well with soap and water or use hand   Teach children how to cover a cough or sneeze  · Wear a face covering (mask) around anyone who does not live in your home  Use a cloth covering with at least 2 layers  You can also create layers by putting a cloth covering over a disposable non-medical mask  Cover your mouth and your nose  The covering should fit snugly against the bridge of your nose  Securely fasten it under your chin and on the sides of your face  Do not  wear a plastic face shield instead of a covering  Continue social distancing and washing your hands often  A face covering is not a substitute for social distancing safety measures  · Follow worldwide, national, and local social distancing guidelines  Keep at least 6 feet (2 meters) between you and others  Also keep this distance from anyone who comes to your home, such as someone making a delivery  Wear a face covering while you are around others   You will need to wear a covering in restaurants, stores, and other public buildings  You will also need a covering on mass transit, such as a bus, subway, or airplane  Remember to use a covering made from thick material or wear 2 coverings together  · Make a habit of not touching your face  If you get the virus on your hands, you can transfer it to your eyes, nose, or mouth and become infected  You can also transfer it to objects, surfaces, or people  Do not touch your eyes, nose, or mouth without washing your hands first     · Clean and disinfect high-touch surfaces and objects often  Use disinfecting wipes, or make a solution of 4 teaspoons of bleach in 1 quart (4 cups) of water  Clean and disinfect even if you think no one living in or coming to your home is infected with the virus  · Ask about other vaccines you may need  Get the influenza (flu) vaccine as soon as recommended each year, usually starting in September or October  Get the pneumonia vaccine if recommended  Your healthcare provider can tell you if you should also get other vaccines, and when to get them  Follow social distancing guidelines:  National and local social distancing rules vary  Rules may change over time as restrictions are lifted  Restrictions may return if an outbreak happens where you live  It is important to know and follow all current social distancing rules in your area  The following are general guidelines:  · Stay home if you are sick or think you may have COVID-19  It is important to stay home if you are waiting for a testing appointment or for test results  Even if you do not have symptoms, you can pass the virus to others  · Limit trips out of your home  Have food, medicines, and other supplies delivered and left at your door or other area, if possible  Plan trips out of your home so you make the fewest stops possible to limit close personal contact  Keep track of places you go  This will help contact tracers notify others if you become infected      · Avoid close physical contact with anyone who does not live in your home  Do not shake hands with, hug, or kiss a person as a greeting  If you must use public transportation (such as a bus or subway), try to sit or stand away from others  Only allow necessary people into your home  Wear your face covering, and remind others to wear a face covering  Remind them to wash their hands when they arrive and before they leave  Do not  let someone into your home or go to someone's home just to visit  Even if you both do not feel sick, the virus can pass from one of you to the other  · Avoid in-person gatherings and crowds  Gatherings or crowds of 10 or more individuals can cause the virus to spread  Avoid places such as [place], beaches, sporting events, and tourist attractions  For events such as parties, holiday meals, [employer] services, and conferences, attend virtually (on a computer), if possible  · Ask your healthcare provider for other ways to have appointments  Some providers offer phone, video, or other types of appointments  You may also be able to get prescriptions for a few months of your medicines at a time  · Stay safe if you must go out to work  Keep physical distance between you and other workers as much as possible  Follow your employer's rules so everyone stays safe  If you have COVID-19 and are recovering at home,  healthcare providers will give you specific instructions to follow  The following are general guidelines to remind you how to keep others safe until you are well:  · Wash your hands often  Use soap and water as much as possible  Use hand  that contains alcohol if soap and water are not available  Dry your hands with a clean towel or paper towel  Do not share towels with anyone  If you use paper towels, throw them away in a lined trash can kept in your room or area  Use a covered trash can, if possible  · Do not go out of your home unless it is necessary    Ask someone who is not infected to go out for groceries or supplies, or have them delivered  Do not go to your healthcare provider's office without an appointment  · Only have close physical contact with a person giving direct care, or a baby or child you must care for  Family members and friends should not visit you  If possible, stay in a separate area or room of your home if you live with others  No one should go into the area or room except to give you care  You can visit with others by phone, video chat, e-mail, or similar systems  · Wear a face covering while others are near you  This can help prevent droplets from spreading the virus when you talk, sneeze, or cough  Put the covering on before anyone comes into your room or area  Remind the person to cover his or her nose and mouth before coming in to provide care for you  · Do not share items  Do not share dishes, towels, or other items with anyone  Items need to be washed after you use them  · Protect your baby  Some newborns have tested positive for the virus  It is not known if they became infected before or after birth  The highest risk is when a  has close contact with an infected person  If you are pregnant or breastfeeding, talk to your healthcare provider or obstetrician about any concerns you have  He or she will tell you when to bring your baby in for check-ups and vaccines  He or she will also tell you what to do if you think your baby was infected with the coronavirus  Wash your hands and put on a clean face covering before you breastfeed or care for your baby  · Do not handle live animals unless it is necessary  Some animals, including pets, have been infected with the new coronavirus  Ask someone who is not infected to take care of your pet until you are well  If you must care for a pet, wear a face covering  Wash your hands before and after you give care  Talk to your healthcare provider about how to keep a service animal safe, if needed      · Follow directions from your healthcare provider for being around others after you recover  It is not known if or for how long a recovered person can pass the virus to others  Your provider may give you instructions, such as continuing social distancing and wearing a face covering  He or she will tell you when it is okay to be around others again  This may be 10 to 20 days after symptoms started or you had a positive test  Most symptoms will also need to be gone  Your provider will give you more information  Follow up with your doctor as directed:  Write down your questions so you remember to ask them during your visits  For more information:   · Centers for Disease Control and Prevention  [address] [facility] [address] [place] , [address]  Phone: [phone] [phone]  Web Address: [employer]    © [address] [place] [address] 2021 Information is for End User's use only and may not be sold, redistributed or otherwise used for commercial purposes  All illustrations and images included in CareNotes® are the copyrighted property of A D A M , Inc  or [address] [place], [place] [address]jean Tillman   The above information is an  only  It is not intended as medical advice for individual conditions or treatments  Talk to your doctor, nurse or pharmacist before following any medical regimen to see if it is safe and effective for you

## 2022-02-03 ENCOUNTER — PROCEDURE VISIT (OUTPATIENT)
Dept: OBGYN CLINIC | Facility: CLINIC | Age: 67
End: 2022-02-03
Payer: MEDICARE

## 2022-02-03 VITALS
SYSTOLIC BLOOD PRESSURE: 133 MMHG | DIASTOLIC BLOOD PRESSURE: 79 MMHG | HEIGHT: 66 IN | HEART RATE: 67 BPM | BODY MASS INDEX: 28.25 KG/M2 | WEIGHT: 175.8 LBS

## 2022-02-03 DIAGNOSIS — M17.0 BILATERAL PRIMARY OSTEOARTHRITIS OF KNEE: Primary | ICD-10-CM

## 2022-02-03 PROCEDURE — 20610 DRAIN/INJ JOINT/BURSA W/O US: CPT | Performed by: ORTHOPAEDIC SURGERY

## 2022-02-03 RX ORDER — HYALURONATE SODIUM 10 MG/ML
20 SYRINGE (ML) INTRAARTICULAR
Status: COMPLETED | OUTPATIENT
Start: 2022-02-03 | End: 2022-02-03

## 2022-02-03 RX ADMIN — Medication 20 MG: at 12:51

## 2022-02-03 NOTE — PROGRESS NOTES
After discussing the options for treatment, the patient elected to proceed forward with Euflexxa injections to reduce pain  Risks of injection, including but not limited to, post-injection pain, swelling, pseudo septic reaction, skin rash, itching, and infection were discussed in detail  The patient understood, had no further questions and elected to proceed forward  After sterile preparation, the 1st Euflexxa injections were injected into the bilateral knees  The patient tolerated the procedure well no complications were noted  The patient was instructed ice and elevate the extremity, limit strenuous activity for the next 2-3 days, and to contact us if there were any questions or concerns prior to their follow-up appointment  I will see the patient back in 1 week for her 2nd injections  Large joint arthrocentesis: bilateral knee  Universal Protocol:  Consent: Verbal consent obtained  Risks and benefits: risks, benefits and alternatives were discussed  Consent given by: patient  Time out: Immediately prior to procedure a "time out" was called to verify the correct patient, procedure, equipment, support staff and site/side marked as required  Patient understanding: patient states understanding of the procedure being performed  Patient consent: the patient's understanding of the procedure matches consent given  Radiology Images displayed and confirmed   If images not available, report reviewed: imaging studies available    Supporting Documentation  Indications: pain   Procedure Details  Location: knee - bilateral knee  Needle size: 22 G  Ultrasound guidance: no  Approach: lateral    Medications (Right): 20 mg Sodium Hyaluronate 20 MG/2MLMedications (Left): 20 mg Sodium Hyaluronate 20 MG/2ML   Patient tolerance: patient tolerated the procedure well with no immediate complications  Dressing:  Sterile dressing applied

## 2022-02-08 ENCOUNTER — HOSPITAL ENCOUNTER (OUTPATIENT)
Dept: RADIOLOGY | Facility: HOSPITAL | Age: 67
Discharge: HOME | End: 2022-02-08
Payer: MEDICARE

## 2022-02-08 DIAGNOSIS — M25.541 ARTHRALGIA OF BOTH HANDS: ICD-10-CM

## 2022-02-08 DIAGNOSIS — M25.542 ARTHRALGIA OF BOTH HANDS: ICD-10-CM

## 2022-02-08 PROCEDURE — 76882 US LMTD JT/FCL EVL NVASC XTR: CPT

## 2022-02-10 ENCOUNTER — PROCEDURE VISIT (OUTPATIENT)
Dept: OBGYN CLINIC | Facility: CLINIC | Age: 67
End: 2022-02-10
Payer: MEDICARE

## 2022-02-10 VITALS
SYSTOLIC BLOOD PRESSURE: 131 MMHG | HEIGHT: 66 IN | DIASTOLIC BLOOD PRESSURE: 77 MMHG | HEART RATE: 66 BPM | BODY MASS INDEX: 28.25 KG/M2 | WEIGHT: 175.8 LBS

## 2022-02-10 DIAGNOSIS — M17.0 BILATERAL PRIMARY OSTEOARTHRITIS OF KNEE: Primary | ICD-10-CM

## 2022-02-10 PROCEDURE — 20610 DRAIN/INJ JOINT/BURSA W/O US: CPT | Performed by: ORTHOPAEDIC SURGERY

## 2022-02-10 RX ORDER — HYALURONATE SODIUM 10 MG/ML
20 SYRINGE (ML) INTRAARTICULAR
Status: COMPLETED | OUTPATIENT
Start: 2022-02-10 | End: 2022-02-10

## 2022-02-10 RX ADMIN — Medication 20 MG: at 14:19

## 2022-02-10 NOTE — PROGRESS NOTES
After discussing the options for treatment, the patient elected to proceed forward with Euflexxa injections to reduce pain  Risks of injection, including but not limited to, post-injection pain, swelling, pseudo septic reaction, skin rash, itching, and infection were discussed in detail  The patient understood, had no further questions and elected to proceed forward  After sterile preparation, the 2nd Euflexxa injections were injected into the bilateral knees  The patient tolerated the procedure well no complications were noted  The patient was instructed ice and elevate the extremity, limit strenuous activity for the next 2-3 days, and to contact us if there were any questions or concerns prior to their follow-up appointment  I will see the patient back in 1 week for 3rd injections  Large joint arthrocentesis: bilateral knee  Universal Protocol:  Consent: Verbal consent obtained  Risks and benefits: risks, benefits and alternatives were discussed  Consent given by: patient  Time out: Immediately prior to procedure a "time out" was called to verify the correct patient, procedure, equipment, support staff and site/side marked as required  Patient understanding: patient states understanding of the procedure being performed  Patient consent: the patient's understanding of the procedure matches consent given  Radiology Images displayed and confirmed   If images not available, report reviewed: imaging studies available    Supporting Documentation  Indications: pain   Procedure Details  Location: knee - bilateral knee  Needle size: 22 G  Ultrasound guidance: no  Approach: lateral    Medications (Right): 20 mg Sodium Hyaluronate 20 MG/2MLMedications (Left): 20 mg Sodium Hyaluronate 20 MG/2ML   Patient tolerance: patient tolerated the procedure well with no immediate complications

## 2022-02-17 ENCOUNTER — PROCEDURE VISIT (OUTPATIENT)
Dept: OBGYN CLINIC | Facility: CLINIC | Age: 67
End: 2022-02-17
Payer: MEDICARE

## 2022-02-17 VITALS
BODY MASS INDEX: 28.12 KG/M2 | HEART RATE: 71 BPM | HEIGHT: 66 IN | WEIGHT: 175 LBS | SYSTOLIC BLOOD PRESSURE: 122 MMHG | DIASTOLIC BLOOD PRESSURE: 69 MMHG

## 2022-02-17 DIAGNOSIS — M17.0 BILATERAL PRIMARY OSTEOARTHRITIS OF KNEE: Primary | ICD-10-CM

## 2022-02-17 PROCEDURE — 20610 DRAIN/INJ JOINT/BURSA W/O US: CPT | Performed by: ORTHOPAEDIC SURGERY

## 2022-02-17 RX ORDER — HYALURONATE SODIUM 10 MG/ML
20 SYRINGE (ML) INTRAARTICULAR
Status: COMPLETED | OUTPATIENT
Start: 2022-02-17 | End: 2022-02-17

## 2022-02-17 RX ADMIN — Medication 20 MG: at 12:57

## 2022-02-17 NOTE — PROGRESS NOTES
After discussing the options for treatment, the patient elected to proceed forward with Euflexxa injection to reduce pain  Risks of injection, including but not limited to, post-injection pain, swelling, pseudo septic reaction, skin rash, itching, and infection were discussed in detail  The patient understood, had no further questions and elected to proceed forward  After sterile preparation, the 3rd Euflexxa  injections were injected into the bilateral knees  The patient tolerated the procedure well no complications were noted  The patient was instructed ice and elevate the extremity, limit strenuous activity for the next 2-3 days, and to contact us if there were any questions or concerns prior to their follow-up appointment  I will see the patient back in 8 weeks for repeat evaluation  Large joint arthrocentesis: bilateral knee  Universal Protocol:  Consent: Verbal consent obtained  Risks and benefits: risks, benefits and alternatives were discussed  Consent given by: patient  Time out: Immediately prior to procedure a "time out" was called to verify the correct patient, procedure, equipment, support staff and site/side marked as required  Patient understanding: patient states understanding of the procedure being performed  Patient consent: the patient's understanding of the procedure matches consent given  Radiology Images displayed and confirmed   If images not available, report reviewed: imaging studies available    Supporting Documentation  Indications: pain   Procedure Details  Location: knee - bilateral knee  Needle size: 22 G  Ultrasound guidance: no  Approach: lateral    Medications (Right): 20 mg Sodium Hyaluronate 20 MG/2MLMedications (Left): 20 mg Sodium Hyaluronate 20 MG/2ML   Patient tolerance: patient tolerated the procedure well with no immediate complications  Dressing:  Sterile dressing applied

## 2022-02-22 ENCOUNTER — APPOINTMENT (OUTPATIENT)
Dept: LAB | Facility: HOSPITAL | Age: 67
End: 2022-02-22
Payer: MEDICARE

## 2022-02-22 DIAGNOSIS — D50.9 IRON DEFICIENCY ANEMIA, UNSPECIFIED IRON DEFICIENCY ANEMIA TYPE: ICD-10-CM

## 2022-02-22 LAB
BASOPHILS # BLD AUTO: 0.06 THOUSANDS/ΜL (ref 0–0.1)
BASOPHILS NFR BLD AUTO: 1 % (ref 0–1)
CRP SERPL QL: 5.9 MG/L
EOSINOPHIL # BLD AUTO: 0.19 THOUSAND/ΜL (ref 0–0.61)
EOSINOPHIL NFR BLD AUTO: 3 % (ref 0–6)
ERYTHROCYTE [DISTWIDTH] IN BLOOD BY AUTOMATED COUNT: 13.2 % (ref 11.6–15.1)
ERYTHROCYTE [SEDIMENTATION RATE] IN BLOOD: 12 MM/HOUR (ref 0–29)
HCT VFR BLD AUTO: 38.5 % (ref 34.8–46.1)
HGB BLD-MCNC: 12.7 G/DL (ref 11.5–15.4)
IMM GRANULOCYTES # BLD AUTO: 0.02 THOUSAND/UL (ref 0–0.2)
IMM GRANULOCYTES NFR BLD AUTO: 0 % (ref 0–2)
LYMPHOCYTES # BLD AUTO: 1.47 THOUSANDS/ΜL (ref 0.6–4.47)
LYMPHOCYTES NFR BLD AUTO: 23 % (ref 14–44)
MCH RBC QN AUTO: 28.9 PG (ref 26.8–34.3)
MCHC RBC AUTO-ENTMCNC: 33 G/DL (ref 31.4–37.4)
MCV RBC AUTO: 88 FL (ref 82–98)
MONOCYTES # BLD AUTO: 0.6 THOUSAND/ΜL (ref 0.17–1.22)
MONOCYTES NFR BLD AUTO: 10 % (ref 4–12)
NEUTROPHILS # BLD AUTO: 3.97 THOUSANDS/ΜL (ref 1.85–7.62)
NEUTS SEG NFR BLD AUTO: 63 % (ref 43–75)
NRBC BLD AUTO-RTO: 0 /100 WBCS
PLATELET # BLD AUTO: 341 THOUSANDS/UL (ref 149–390)
PMV BLD AUTO: 10.5 FL (ref 8.9–12.7)
RBC # BLD AUTO: 4.4 MILLION/UL (ref 3.81–5.12)
WBC # BLD AUTO: 6.31 THOUSAND/UL (ref 4.31–10.16)

## 2022-02-22 PROCEDURE — 85652 RBC SED RATE AUTOMATED: CPT | Performed by: INTERNAL MEDICINE

## 2022-02-22 PROCEDURE — 85025 COMPLETE CBC W/AUTO DIFF WBC: CPT

## 2022-02-22 PROCEDURE — 36415 COLL VENOUS BLD VENIPUNCTURE: CPT | Performed by: INTERNAL MEDICINE

## 2022-02-22 PROCEDURE — 86225 DNA ANTIBODY NATIVE: CPT | Performed by: INTERNAL MEDICINE

## 2022-02-22 PROCEDURE — 86140 C-REACTIVE PROTEIN: CPT | Performed by: INTERNAL MEDICINE

## 2022-02-23 LAB — DSDNA AB SER-ACNC: 12 IU/ML (ref 0–9)
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
## 2022-03-01 ENCOUNTER — OFFICE VISIT (OUTPATIENT)
Dept: RHEUMATOLOGY | Facility: CLINIC | Age: 67
End: 2022-03-01
Payer: MEDICARE

## 2022-03-01 VITALS
DIASTOLIC BLOOD PRESSURE: 80 MMHG | WEIGHT: 175 LBS | HEIGHT: 66 IN | SYSTOLIC BLOOD PRESSURE: 130 MMHG | BODY MASS INDEX: 28.12 KG/M2

## 2022-03-01 DIAGNOSIS — M06.00 SERONEGATIVE RHEUMATOID ARTHRITIS (HCC): Primary | ICD-10-CM

## 2022-03-01 DIAGNOSIS — M15.9 PRIMARY OSTEOARTHRITIS INVOLVING MULTIPLE JOINTS: ICD-10-CM

## 2022-03-01 DIAGNOSIS — R76.8 POSITIVE DOUBLE STRANDED DNA ANTIBODY TEST: ICD-10-CM

## 2022-03-01 PROCEDURE — 99214 OFFICE O/P EST MOD 30 MIN: CPT | Performed by: INTERNAL MEDICINE

## 2022-03-07 ENCOUNTER — TELEPHONE (OUTPATIENT)
Dept: ADMINISTRATIVE | Facility: OTHER | Age: 67
End: 2022-03-07

## 2022-03-07 ENCOUNTER — OFFICE VISIT (OUTPATIENT)
Dept: INTERNAL MEDICINE CLINIC | Facility: CLINIC | Age: 67
End: 2022-03-07
Payer: MEDICARE

## 2022-03-07 VITALS
BODY MASS INDEX: 28.54 KG/M2 | DIASTOLIC BLOOD PRESSURE: 74 MMHG | RESPIRATION RATE: 16 BRPM | WEIGHT: 177.6 LBS | TEMPERATURE: 98 F | OXYGEN SATURATION: 97 % | SYSTOLIC BLOOD PRESSURE: 115 MMHG | HEIGHT: 66 IN | HEART RATE: 85 BPM

## 2022-03-07 DIAGNOSIS — I10 ESSENTIAL HYPERTENSION: Primary | ICD-10-CM

## 2022-03-07 DIAGNOSIS — E55.9 VITAMIN D DEFICIENCY: ICD-10-CM

## 2022-03-07 DIAGNOSIS — E66.3 OVERWEIGHT: ICD-10-CM

## 2022-03-07 DIAGNOSIS — M06.09 RHEUMATOID ARTHRITIS OF MULTIPLE SITES WITH NEGATIVE RHEUMATOID FACTOR (HCC): ICD-10-CM

## 2022-03-07 DIAGNOSIS — E21.3 HYPERPARATHYROIDISM (HCC): ICD-10-CM

## 2022-03-07 DIAGNOSIS — E78.2 MIXED HYPERLIPIDEMIA: ICD-10-CM

## 2022-03-07 PROBLEM — E66.9 OBESITY (BMI 30-39.9): Status: RESOLVED | Noted: 2018-07-05 | Resolved: 2022-03-07

## 2022-03-07 PROBLEM — R07.2 PRECORDIAL PAIN: Status: RESOLVED | Noted: 2021-08-12 | Resolved: 2022-03-07

## 2022-03-07 PROCEDURE — 99214 OFFICE O/P EST MOD 30 MIN: CPT | Performed by: INTERNAL MEDICINE
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
## 2022-03-07 NOTE — TELEPHONE ENCOUNTER
Upon review of the In Basket request we were able to locate, review, and update the patient chart as requested for Mammogram     Any additional questions or concerns should be emailed to the Practice Liaisons via [email or web address] email, please do not reply via In Basket      Thank you  Essence Monge MA

## 2022-03-07 NOTE — TELEPHONE ENCOUNTER
----- Message from [facility] sent at 3/7/2022 10:22 AM EST -----  03/07/22 10:22 AM    Hello, our patient Linda Molina has had Mammogram completed/performed  Please assist in updating the patient chart by pulling a previous Electronic Medical Record (EMR) document  The previous EMR is [facility]  The date of service is 08/2021       Thank you,  [address]

## 2022-03-30 DIAGNOSIS — I10 ESSENTIAL HYPERTENSION: ICD-10-CM

## 2022-03-30 RX ORDER — OLMESARTAN MEDOXOMIL 40 MG/1
TABLET ORAL
Qty: 90 TABLET | Refills: 3 | Status: SHIPPED | OUTPATIENT
Start: 2022-03-30 | End: 2022-03-31 | Stop reason: SDUPTHER

## 2022-03-31 DIAGNOSIS — I10 ESSENTIAL HYPERTENSION: ICD-10-CM

## 2022-03-31 RX ORDER — OLMESARTAN MEDOXOMIL 40 MG/1
40 TABLET ORAL DAILY
Qty: 90 TABLET | Refills: 3 | Status: SHIPPED | OUTPATIENT
Start: 2022-03-31 | End: 2022-07-19

## 2022-04-14 ENCOUNTER — OFFICE VISIT (OUTPATIENT)
Dept: OBGYN CLINIC | Facility: CLINIC | Age: 67
End: 2022-04-14
Payer: MEDICARE

## 2022-04-14 VITALS
WEIGHT: 177.6 LBS | DIASTOLIC BLOOD PRESSURE: 72 MMHG | HEART RATE: 62 BPM | SYSTOLIC BLOOD PRESSURE: 112 MMHG | BODY MASS INDEX: 28.54 KG/M2 | HEIGHT: 66 IN

## 2022-04-14 DIAGNOSIS — M17.0 BILATERAL PRIMARY OSTEOARTHRITIS OF KNEE: Primary | ICD-10-CM

## 2022-04-14 PROCEDURE — 99213 OFFICE O/P EST LOW 20 MIN: CPT | Performed by: ORTHOPAEDIC SURGERY

## 2022-04-14 NOTE — PROGRESS NOTES
Patient Name:  Boo Juan  MRN:  [id number]    [address] [place] [address]     1  Bilateral primary osteoarthritis of knee        78 y o  female with bilateral knee osteoarthritis  Overall Mirtha Ramirez is doing well  We discussed that the Euflexxa injection can be repeated in August if needed  If pain returns sooner, CSI's may be performed  Activities to tolerance, no restrictions  Follow up in the office as needed if symptoms worsen or fail to improve  History of the Present Illness   Boo Juan is a 78 y o  female with bilateral knee osteoarthritis  Mirtha Ramirez underwent bilateral Euflexxa injection series, last injection being on 2/17/22  She states that the Euflexxa injections were at least 70 percent beneficial for her  She notes minimal knee pain with cold weather or when getting out of bed  She is taking Gabapentin at night for another medical issue  She is not taking anything for pain specifically for her knee's  She will use Voltaren Gel on an as needed basis  She is very happy  Review of Systems     Review of Systems   Constitutional: Negative for chills, fever and unexpected weight change  HENT: Negative for hearing loss, nosebleeds and sore throat  Eyes: Negative for pain, redness and visual disturbance  Respiratory: Negative for cough, shortness of breath and wheezing  Cardiovascular: Negative for chest pain, palpitations and leg swelling  Gastrointestinal: Negative for abdominal pain, nausea and vomiting  Endocrine: Negative for polydipsia and polyuria  Genitourinary: Negative for difficulty urinating and hematuria  Musculoskeletal: Negative for arthralgias, joint swelling and myalgias  Skin: Negative for rash and wound  Neurological: Negative for dizziness, numbness and headaches  Psychiatric/Behavioral: Negative for decreased concentration, dysphoric mood and suicidal ideas  The patient is not nervous/anxious          Physical Exam     /72   Pulse 62   Ht 5' 6" (1 676 m)   Wt 80 6 kg (177 lb 9 6 oz)   LMP  (LMP Unknown)   BMI 28 67 kg/m²     Bilateral Knee  Range of motion from 2 to 120 with crepitus on the left, 5-120 on the right  There is no effusion  There is no tenderness over the medial or lateral joint line bilaterally  The patient is ableperform a straight leg raise  Varus stress testing reveals stable at 0 and 30 degrees   Valgus stress testing reveals stable at 0 and 30 degrees  The patient is neurovascular intact distally  Data Review     I have personally reviewed pertinent films in PACS, and my interpretation follows      No new images    Social History     Tobacco Use    Smoking status: Never Smoker    Smokeless tobacco: Never Used   Vaping Use    Vaping Use: Never used   Substance Use Topics    Alcohol use: Not Currently     Alcohol/week: 1 0 standard drink     Types: 1 Glasses of wine per week     Comment: on occasion    Drug use: Never           Procedures      Scribe Attestation    I,:  Evelyn Dubon am acting as a scribe while in the presence of the attending physician :       I,:  Bear Starks DO personally performed the services described in this documentation    as scribed in my presence :

## 2022-06-06 DIAGNOSIS — G60.9 IDIOPATHIC PERIPHERAL NEUROPATHY: ICD-10-CM

## 2022-06-06 RX ORDER — GABAPENTIN 600 MG/1
TABLET ORAL
Qty: 180 TABLET | Refills: 3 | Status: SHIPPED | OUTPATIENT
Start: 2022-06-06

## 2022-06-10 NOTE — TELEPHONE ENCOUNTER
Left detailed message ok for her to  a copy Apixaban/Eliquis is used to treat and prevent blood clots. If you are not able to swallow the tablets whole, they may be crushed and mixed in water, apple juice, or applesauce and promptly taken within four hours. Never skip a dose of Apixaban/Eliquis. If you forget to take your Apixaban/Eliquis, take a dose as soon as you remember. If it is almost time for your next Apixaban/Eliquis dose, wait until then and take a regular dose. DO NOT take an extra pill to ‘catch up’.  NEVER TAKE A DOUBLE DOSE. Notify your doctor that you missed a dose. Take Apixaban/Eliquis at the same time each morning and evening. Apixaban/Eliquis may be taken with other medication or food.

## 2022-06-30 ENCOUNTER — TELEPHONE (OUTPATIENT)
Dept: GASTROENTEROLOGY | Facility: CLINIC | Age: 67
End: 2022-06-30

## 2022-07-14 ENCOUNTER — APPOINTMENT (OUTPATIENT)
Dept: LAB | Facility: HOSPITAL | Age: 67
End: 2022-07-14
Payer: MEDICARE

## 2022-07-14 ENCOUNTER — TELEPHONE (OUTPATIENT)
Dept: INTERNAL MEDICINE CLINIC | Facility: CLINIC | Age: 67
End: 2022-07-14

## 2022-07-14 DIAGNOSIS — E78.2 MIXED HYPERLIPIDEMIA: ICD-10-CM

## 2022-07-14 DIAGNOSIS — E55.9 VITAMIN D DEFICIENCY: ICD-10-CM

## 2022-07-14 LAB
25(OH)D3 SERPL-MCNC: 35.6 NG/ML (ref 30–100)
ALBUMIN SERPL BCP-MCNC: 3.5 G/DL (ref 3.5–5)
ALP SERPL-CCNC: 59 U/L (ref 46–116)
ALT SERPL W P-5'-P-CCNC: 25 U/L (ref 12–78)
ANION GAP SERPL CALCULATED.3IONS-SCNC: 12 MMOL/L (ref 4–13)
AST SERPL W P-5'-P-CCNC: 18 U/L (ref 5–45)
BASOPHILS # BLD AUTO: 0.04 THOUSANDS/ΜL (ref 0–0.1)
BASOPHILS NFR BLD AUTO: 1 % (ref 0–1)
BILIRUB SERPL-MCNC: 0.49 MG/DL (ref 0.2–1)
BUN SERPL-MCNC: 25 MG/DL (ref 5–25)
CALCIUM SERPL-MCNC: 9.2 MG/DL (ref 8.3–10.1)
CHLORIDE SERPL-SCNC: 105 MMOL/L (ref 100–108)
CHOLEST SERPL-MCNC: 252 MG/DL
CO2 SERPL-SCNC: 24 MMOL/L (ref 21–32)
CREAT SERPL-MCNC: 0.83 MG/DL (ref 0.6–1.3)
EOSINOPHIL # BLD AUTO: 0.2 THOUSAND/ΜL (ref 0–0.61)
EOSINOPHIL NFR BLD AUTO: 4 % (ref 0–6)
ERYTHROCYTE [DISTWIDTH] IN BLOOD BY AUTOMATED COUNT: 13.2 % (ref 11.6–15.1)
GFR SERPL CREATININE-BSD FRML MDRD: 73 ML/MIN/1.73SQ M
GLUCOSE P FAST SERPL-MCNC: 92 MG/DL (ref 65–99)
HCT VFR BLD AUTO: 39.4 % (ref 34.8–46.1)
HDLC SERPL-MCNC: 62 MG/DL
HGB BLD-MCNC: 12.9 G/DL (ref 11.5–15.4)
IMM GRANULOCYTES # BLD AUTO: 0.01 THOUSAND/UL (ref 0–0.2)
IMM GRANULOCYTES NFR BLD AUTO: 0 % (ref 0–2)
LDLC SERPL CALC-MCNC: 157 MG/DL (ref 0–100)
LYMPHOCYTES # BLD AUTO: 1.5 THOUSANDS/ΜL (ref 0.6–4.47)
LYMPHOCYTES NFR BLD AUTO: 27 % (ref 14–44)
MCH RBC QN AUTO: 29.1 PG (ref 26.8–34.3)
MCHC RBC AUTO-ENTMCNC: 32.7 G/DL (ref 31.4–37.4)
MCV RBC AUTO: 89 FL (ref 82–98)
MONOCYTES # BLD AUTO: 0.5 THOUSAND/ΜL (ref 0.17–1.22)
MONOCYTES NFR BLD AUTO: 9 % (ref 4–12)
NEUTROPHILS # BLD AUTO: 3.23 THOUSANDS/ΜL (ref 1.85–7.62)
NEUTS SEG NFR BLD AUTO: 59 % (ref 43–75)
NONHDLC SERPL-MCNC: 190 MG/DL
NRBC BLD AUTO-RTO: 0 /100 WBCS
PLATELET # BLD AUTO: 334 THOUSANDS/UL (ref 149–390)
PMV BLD AUTO: 10.2 FL (ref 8.9–12.7)
POTASSIUM SERPL-SCNC: 3.8 MMOL/L (ref 3.5–5.3)
PROT SERPL-MCNC: 7.7 G/DL (ref 6.4–8.2)
RBC # BLD AUTO: 4.44 MILLION/UL (ref 3.81–5.12)
SODIUM SERPL-SCNC: 141 MMOL/L (ref 136–145)
TRIGL SERPL-MCNC: 164 MG/DL
TSH SERPL DL<=0.05 MIU/L-ACNC: 2.38 UIU/ML (ref 0.45–4.5)
WBC # BLD AUTO: 5.48 THOUSAND/UL (ref 4.31–10.16)

## 2022-07-14 PROCEDURE — 36415 COLL VENOUS BLD VENIPUNCTURE: CPT

## 2022-07-14 PROCEDURE — 82306 VITAMIN D 25 HYDROXY: CPT

## 2022-07-14 PROCEDURE — 80061 LIPID PANEL: CPT

## 2022-07-14 PROCEDURE — 84443 ASSAY THYROID STIM HORMONE: CPT

## 2022-07-14 PROCEDURE — 80053 COMPREHEN METABOLIC PANEL: CPT

## 2022-07-14 PROCEDURE — 85025 COMPLETE CBC W/AUTO DIFF WBC: CPT

## 2022-07-14 NOTE — TELEPHONE ENCOUNTER
----- Message from Magdalena Schrader MD sent at 7/14/2022 12:27 PM EDT -----  Cholesterol is high, please cut down on fats

## 2022-07-19 ENCOUNTER — OFFICE VISIT (OUTPATIENT)
Dept: INTERNAL MEDICINE CLINIC | Facility: CLINIC | Age: 67
End: 2022-07-19
Payer: MEDICARE

## 2022-07-19 VITALS
TEMPERATURE: 97.5 F | RESPIRATION RATE: 18 BRPM | HEIGHT: 66 IN | HEART RATE: 65 BPM | BODY MASS INDEX: 28.9 KG/M2 | SYSTOLIC BLOOD PRESSURE: 124 MMHG | WEIGHT: 179.8 LBS | OXYGEN SATURATION: 95 % | DIASTOLIC BLOOD PRESSURE: 78 MMHG

## 2022-07-19 DIAGNOSIS — G60.9 IDIOPATHIC PERIPHERAL NEUROPATHY: ICD-10-CM

## 2022-07-19 DIAGNOSIS — E55.9 VITAMIN D DEFICIENCY: ICD-10-CM

## 2022-07-19 DIAGNOSIS — G89.29 CHRONIC RIGHT-SIDED LOW BACK PAIN WITH RIGHT-SIDED SCIATICA: ICD-10-CM

## 2022-07-19 DIAGNOSIS — M25.512 CHRONIC PAIN OF BOTH SHOULDERS: ICD-10-CM

## 2022-07-19 DIAGNOSIS — E78.2 MIXED HYPERLIPIDEMIA: ICD-10-CM

## 2022-07-19 DIAGNOSIS — M54.41 CHRONIC RIGHT-SIDED LOW BACK PAIN WITH RIGHT-SIDED SCIATICA: ICD-10-CM

## 2022-07-19 DIAGNOSIS — M06.09 RHEUMATOID ARTHRITIS OF MULTIPLE SITES WITH NEGATIVE RHEUMATOID FACTOR (HCC): ICD-10-CM

## 2022-07-19 DIAGNOSIS — M85.89 OSTEOPENIA OF MULTIPLE SITES: ICD-10-CM

## 2022-07-19 DIAGNOSIS — I10 ESSENTIAL HYPERTENSION: Primary | ICD-10-CM

## 2022-07-19 DIAGNOSIS — G89.29 CHRONIC PAIN OF BOTH SHOULDERS: ICD-10-CM

## 2022-07-19 DIAGNOSIS — M25.511 CHRONIC PAIN OF BOTH SHOULDERS: ICD-10-CM

## 2022-07-19 PROCEDURE — 99214 OFFICE O/P EST MOD 30 MIN: CPT | Performed by: INTERNAL MEDICINE

## 2022-07-19 RX ORDER — TIZANIDINE 4 MG/1
4 TABLET ORAL 2 TIMES DAILY
Qty: 20 TABLET | Refills: 0 | Status: SHIPPED | OUTPATIENT
Start: 2022-07-19
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
## 2022-08-19 ENCOUNTER — APPOINTMENT (OUTPATIENT)
Dept: LAB | Facility: HOSPITAL | Age: 67
End: 2022-08-19
Payer: MEDICARE

## 2022-08-19 LAB
ANION GAP SERPL CALCULATED.3IONS-SCNC: 8 MMOL/L (ref 4–13)
BACTERIA UR QL AUTO: ABNORMAL /HPF
BILIRUB UR QL STRIP: NEGATIVE
BUN SERPL-MCNC: 29 MG/DL (ref 5–25)
C3 SERPL-MCNC: 115 MG/DL (ref 90–180)
C4 SERPL-MCNC: 42 MG/DL (ref 10–40)
CALCIUM SERPL-MCNC: 9.3 MG/DL (ref 8.3–10.1)
CHLORIDE SERPL-SCNC: 105 MMOL/L (ref 96–108)
CLARITY UR: CLEAR
CO2 SERPL-SCNC: 29 MMOL/L (ref 21–32)
COLOR UR: YELLOW
CREAT SERPL-MCNC: 0.87 MG/DL (ref 0.6–1.3)
CRP SERPL QL: 3.2 MG/L
ERYTHROCYTE [DISTWIDTH] IN BLOOD BY AUTOMATED COUNT: 12.7 % (ref 11.6–15.1)
ERYTHROCYTE [SEDIMENTATION RATE] IN BLOOD: 21 MM/HOUR (ref 0–29)
GFR SERPL CREATININE-BSD FRML MDRD: 69 ML/MIN/1.73SQ M
GLUCOSE P FAST SERPL-MCNC: 92 MG/DL (ref 65–99)
GLUCOSE UR STRIP-MCNC: NEGATIVE MG/DL
HCT VFR BLD AUTO: 38.5 % (ref 34.8–46.1)
HGB BLD-MCNC: 12.8 G/DL (ref 11.5–15.4)
HGB UR QL STRIP.AUTO: NEGATIVE
KETONES UR STRIP-MCNC: NEGATIVE MG/DL
LEUKOCYTE ESTERASE UR QL STRIP: NEGATIVE
MCH RBC QN AUTO: 29.5 PG (ref 26.8–34.3)
MCHC RBC AUTO-ENTMCNC: 33.2 G/DL (ref 31.4–37.4)
MCV RBC AUTO: 89 FL (ref 82–98)
MUCOUS THREADS UR QL AUTO: ABNORMAL
NITRITE UR QL STRIP: NEGATIVE
NON-SQ EPI CELLS URNS QL MICRO: ABNORMAL /HPF
PH UR STRIP.AUTO: 5.5 [PH]
PLATELET # BLD AUTO: 334 THOUSANDS/UL (ref 149–390)
PMV BLD AUTO: 10.4 FL (ref 8.9–12.7)
POTASSIUM SERPL-SCNC: 3.8 MMOL/L (ref 3.5–5.3)
PROT UR STRIP-MCNC: NEGATIVE MG/DL
RBC # BLD AUTO: 4.34 MILLION/UL (ref 3.81–5.12)
RBC #/AREA URNS AUTO: ABNORMAL /HPF
SODIUM SERPL-SCNC: 142 MMOL/L (ref 135–147)
SP GR UR STRIP.AUTO: >=1.03 (ref 1–1.03)
UROBILINOGEN UR QL STRIP.AUTO: 0.2 E.U./DL
WBC # BLD AUTO: 6.09 THOUSAND/UL (ref 4.31–10.16)
WBC #/AREA URNS AUTO: ABNORMAL /HPF

## 2022-08-20 LAB — DSDNA AB SER-ACNC: 19 IU/ML (ref 0–9)
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
## 2022-09-02 ENCOUNTER — OFFICE VISIT (OUTPATIENT)
Dept: RHEUMATOLOGY | Facility: CLINIC | Age: 67
End: 2022-09-02
Payer: MEDICARE

## 2022-09-02 ENCOUNTER — TELEPHONE (OUTPATIENT)
Dept: OBGYN CLINIC | Facility: MEDICAL CENTER | Age: 67
End: 2022-09-02

## 2022-09-02 VITALS
OXYGEN SATURATION: 97 % | DIASTOLIC BLOOD PRESSURE: 88 MMHG | BODY MASS INDEX: 29.02 KG/M2 | HEART RATE: 68 BPM | SYSTOLIC BLOOD PRESSURE: 142 MMHG | HEIGHT: 66 IN

## 2022-09-02 DIAGNOSIS — R76.8 POSITIVE DOUBLE STRANDED DNA ANTIBODY TEST: ICD-10-CM

## 2022-09-02 DIAGNOSIS — M06.00 SERONEGATIVE RHEUMATOID ARTHRITIS (HCC): ICD-10-CM

## 2022-09-02 DIAGNOSIS — M15.9 PRIMARY OSTEOARTHRITIS INVOLVING MULTIPLE JOINTS: ICD-10-CM

## 2022-09-02 DIAGNOSIS — M06.00 SERONEGATIVE RHEUMATOID ARTHRITIS (HCC): Primary | ICD-10-CM

## 2022-09-02 PROCEDURE — 99214 OFFICE O/P EST MOD 30 MIN: CPT | Performed by: INTERNAL MEDICINE

## 2022-09-02 RX ORDER — HYDROXYCHLOROQUINE SULFATE 200 MG/1
200 TABLET, FILM COATED ORAL 2 TIMES DAILY WITH MEALS
Qty: 60 TABLET | Refills: 3 | Status: SHIPPED | OUTPATIENT
Start: 2022-09-02 | End: 2022-09-04 | Stop reason: SDUPTHER

## 2022-09-02 NOTE — TELEPHONE ENCOUNTER
Patient sees Dr Suraj Ponce  Patient is requesting a 90 day supply for hydroxychloroquine 200 mg, from [facility], she states it will be cheaper than the 60 day supply  She did not   Pick it up and would like a call back once it is ready        # [phone]

## 2022-09-04 RX ORDER — HYDROXYCHLOROQUINE SULFATE 200 MG/1
200 TABLET, FILM COATED ORAL 2 TIMES DAILY WITH MEALS
Qty: 180 TABLET | Refills: 1 | Status: SHIPPED | OUTPATIENT
Start: 2022-09-04 | End: 2022-09-07 | Stop reason: SDUPTHER

## 2022-09-07 RX ORDER — HYDROXYCHLOROQUINE SULFATE 200 MG/1
200 TABLET, FILM COATED ORAL 2 TIMES DAILY WITH MEALS
Qty: 180 TABLET | Refills: 1 | Status: SHIPPED | OUTPATIENT
Start: 2022-09-07 | End: 2022-12-06

## 2022-09-07 NOTE — TELEPHONE ENCOUNTER
Patient advised the pharmacy has not received her script - They cancelled her 30 day supply and advised the script should be sent over for 90 days - thank you

## 2022-09-07 NOTE — TELEPHONE ENCOUNTER
3rd time I am sending indave in again for 90 days, please let her know and if they are still saying they have not received it, then we will have to call it in

## 2022-09-15 DIAGNOSIS — M17.0 BILATERAL PRIMARY OSTEOARTHRITIS OF KNEE: Primary | ICD-10-CM

## 2022-09-29 ENCOUNTER — PROCEDURE VISIT (OUTPATIENT)
Dept: OBGYN CLINIC | Facility: CLINIC | Age: 67
End: 2022-09-29
Payer: MEDICARE

## 2022-09-29 VITALS
SYSTOLIC BLOOD PRESSURE: 132 MMHG | DIASTOLIC BLOOD PRESSURE: 79 MMHG | WEIGHT: 179.6 LBS | HEIGHT: 66 IN | BODY MASS INDEX: 28.87 KG/M2 | HEART RATE: 62 BPM

## 2022-09-29 DIAGNOSIS — M17.0 BILATERAL PRIMARY OSTEOARTHRITIS OF KNEE: Primary | ICD-10-CM

## 2022-09-29 PROCEDURE — 20610 DRAIN/INJ JOINT/BURSA W/O US: CPT | Performed by: ORTHOPAEDIC SURGERY

## 2022-09-29 RX ORDER — HYALURONATE SODIUM 10 MG/ML
20 SYRINGE (ML) INTRAARTICULAR
Status: COMPLETED | OUTPATIENT
Start: 2022-09-29 | End: 2022-09-29

## 2022-09-29 RX ADMIN — Medication 20 MG: at 10:01

## 2022-09-29 NOTE — PROGRESS NOTES
After discussing the options for treatment, the patient elected to proceed forward with Bilateral knee Euflexxa injections to reduce pain  Risks of injection, including but not limited to, post-injection pain, swelling, pseudo septic reaction, skin rash, itching, and infection were discussed in detail  The patient understood, had no further questions and elected to proceed forward  After sterile preparation, the Euflexxa injection was injected into the Bilateral knees  The patient tolerated the procedure well no complications were noted  The patient was instructed ice and elevate the extremity, limit strenuous activity for the next 2-3 days, and to contact us if there were any questions or concerns prior to their follow-up appointment  I will see the patient back in 1 week for second injection          Large joint arthrocentesis: R knee  Universal Protocol:  Consent given by: patient  Patient identity confirmed: verbally with patient    Supporting Documentation  Indications: pain   Procedure Details  Location: knee - R knee  Needle size: 22 G  Medications administered: 20 mg Sodium Hyaluronate 20 MG/2ML    Patient tolerance: patient tolerated the procedure well with no immediate complications  Dressing:  Sterile dressing applied    Large joint arthrocentesis: L knee  Universal Protocol:  Consent given by: patient  Patient identity confirmed: verbally with patient    Supporting Documentation  Indications: pain   Procedure Details  Location: knee - L knee  Needle size: 22 G  Approach: lateral  Medications administered: 20 mg Sodium Hyaluronate 20 MG/2ML    Patient tolerance: patient tolerated the procedure well with no immediate complications  Dressing:  Sterile dressing applied

## 2022-09-30 ENCOUNTER — HOSPITAL ENCOUNTER (OUTPATIENT)
Dept: BONE DENSITY | Facility: CLINIC | Age: 67
Discharge: HOME | End: 2022-09-30

## 2022-09-30 DIAGNOSIS — M85.89 OSTEOPENIA OF MULTIPLE SITES: ICD-10-CM

## 2022-10-06 ENCOUNTER — PROCEDURE VISIT (OUTPATIENT)
Dept: OBGYN CLINIC | Facility: CLINIC | Age: 67
End: 2022-10-06
Payer: MEDICARE

## 2022-10-06 VITALS
BODY MASS INDEX: 28.87 KG/M2 | WEIGHT: 179.6 LBS | DIASTOLIC BLOOD PRESSURE: 74 MMHG | HEART RATE: 65 BPM | HEIGHT: 66 IN | SYSTOLIC BLOOD PRESSURE: 138 MMHG

## 2022-10-06 DIAGNOSIS — M17.0 BILATERAL PRIMARY OSTEOARTHRITIS OF KNEE: Primary | ICD-10-CM

## 2022-10-06 PROCEDURE — 20610 DRAIN/INJ JOINT/BURSA W/O US: CPT | Performed by: ORTHOPAEDIC SURGERY

## 2022-10-06 RX ORDER — HYALURONATE SODIUM 10 MG/ML
20 SYRINGE (ML) INTRAARTICULAR
Status: COMPLETED | OUTPATIENT
Start: 2022-10-06 | End: 2022-10-06

## 2022-10-06 RX ADMIN — Medication 20 MG: at 10:40

## 2022-10-06 NOTE — PROGRESS NOTES
After discussing the options for treatment, the patient elected to proceed forward with Bilateral knee Euflexxa injection to reduce pain  Risks of injection, including but not limited to, post-injection pain, swelling, pseudo septic reaction, skin rash, itching, and infection were discussed in detail  The patient understood, had no further questions and elected to proceed forward  After sterile preparation, the Euflexxa injections were injected into the Bilateral knees after 1st aspirating 8 cc of clear yellow fluid from right knee  The patient tolerated the procedure well no complications were noted  The patient was instructed ice and elevate the extremity, limit strenuous activity for the next 2-3 days, and to contact us if there were any questions or concerns prior to their follow-up appointment  I will see the patient back in 1 week for 3rd injections of bilateral knees          Large joint arthrocentesis: R knee  Universal Protocol:  Consent given by: patient  Patient identity confirmed: verbally with patient    Supporting Documentation  Indications: pain   Procedure Details  Location: knee - R knee  Needle size: 22 G  Medications administered: 20 mg Sodium Hyaluronate 20 MG/2ML    Aspirate amount: 8 mL  Aspirate: yellow and clear    Patient tolerance: patient tolerated the procedure well with no immediate complications  Dressing:  Sterile dressing applied    Large joint arthrocentesis: L knee  Universal Protocol:  Consent given by: patient  Patient identity confirmed: verbally with patient    Supporting Documentation  Indications: pain   Procedure Details  Location: knee - L knee  Needle size: 22 G  Approach: lateral  Medications administered: 20 mg Sodium Hyaluronate 20 MG/2ML    Patient tolerance: patient tolerated the procedure well with no immediate complications  Dressing:  Sterile dressing applied

## 2022-10-13 ENCOUNTER — PROCEDURE VISIT (OUTPATIENT)
Dept: OBGYN CLINIC | Facility: CLINIC | Age: 67
End: 2022-10-13
Payer: MEDICARE

## 2022-10-13 VITALS
BODY MASS INDEX: 28.87 KG/M2 | DIASTOLIC BLOOD PRESSURE: 76 MMHG | HEART RATE: 70 BPM | SYSTOLIC BLOOD PRESSURE: 125 MMHG | WEIGHT: 179.6 LBS | HEIGHT: 66 IN

## 2022-10-13 DIAGNOSIS — M17.0 BILATERAL PRIMARY OSTEOARTHRITIS OF KNEE: Primary | ICD-10-CM

## 2022-10-13 PROCEDURE — 20610 DRAIN/INJ JOINT/BURSA W/O US: CPT | Performed by: ORTHOPAEDIC SURGERY

## 2022-10-13 RX ORDER — HYALURONATE SODIUM 10 MG/ML
20 SYRINGE (ML) INTRAARTICULAR
Status: COMPLETED | OUTPATIENT
Start: 2022-10-13 | End: 2022-10-13

## 2022-10-13 RX ADMIN — Medication 20 MG: at 08:14

## 2022-10-13 NOTE — PROGRESS NOTES
After discussing the options for treatment, the patient elected to proceed forward with Bilateral knee Euflexxa injection to reduce pain  Risks of injection, including but not limited to, post-injection pain, swelling, pseudo septic reaction, skin rash, itching, and infection were discussed in detail  The patient understood, had no further questions and elected to proceed forward  After sterile preparation, the Euflexxa injections were injected into the Bilateral knees  The patient tolerated the procedure well no complications were noted  The patient was instructed ice and elevate the extremity, limit strenuous activity for the next 2-3 days, and to contact us if there were any questions or concerns prior to their follow-up appointment  I will see the patient back in 10-12 weeks for reevaluation of bilateral knees          Large joint arthrocentesis: L knee  Universal Protocol:  Consent given by: patient  Patient identity confirmed: verbally with patient    Supporting Documentation  Indications: pain   Procedure Details  Location: knee - L knee  Needle size: 22 G  Approach: lateral  Medications administered: 20 mg Sodium Hyaluronate 20 MG/2ML    Patient tolerance: patient tolerated the procedure well with no immediate complications  Dressing:  Sterile dressing applied    Large joint arthrocentesis: R knee  Universal Protocol:  Consent given by: patient  Patient identity confirmed: verbally with patient    Supporting Documentation  Indications: pain   Procedure Details  Location: knee - R knee  Needle size: 22 G  Medications administered: 20 mg Sodium Hyaluronate 20 MG/2ML    Patient tolerance: patient tolerated the procedure well with no immediate complications  Dressing:  Sterile dressing applied

## 2022-10-25 DIAGNOSIS — I10 ESSENTIAL HYPERTENSION: ICD-10-CM

## 2022-10-25 RX ORDER — HYDROCHLOROTHIAZIDE 25 MG/1
TABLET ORAL
Qty: 90 TABLET | Refills: 3 | Status: SHIPPED | OUTPATIENT
Start: 2022-10-25

## 2022-10-27 ENCOUNTER — HOSPITAL ENCOUNTER (OUTPATIENT)
Dept: BONE DENSITY | Facility: CLINIC | Age: 67
Discharge: HOME | End: 2022-10-27
Payer: MEDICARE

## 2022-10-27 PROCEDURE — 77080 DXA BONE DENSITY AXIAL: CPT

## 2022-10-31 ENCOUNTER — TELEPHONE (OUTPATIENT)
Dept: INTERNAL MEDICINE CLINIC | Facility: CLINIC | Age: 67
End: 2022-10-31

## 2022-10-31 NOTE — TELEPHONE ENCOUNTER
----- Message from Lyndon Peguero MD sent at 10/31/2022  5:35 PM EDT -----   DEXA scan shows low bone density, please take calcium 600 mg with vitamin-D daily

## 2022-11-21 ENCOUNTER — APPOINTMENT (OUTPATIENT)
Dept: LAB | Facility: CLINIC | Age: 67
End: 2022-11-21

## 2022-11-21 DIAGNOSIS — E78.2 MIXED HYPERLIPIDEMIA: ICD-10-CM

## 2022-11-21 DIAGNOSIS — E55.9 VITAMIN D DEFICIENCY: ICD-10-CM

## 2022-11-21 LAB
25(OH)D3 SERPL-MCNC: 42.3 NG/ML (ref 30–100)
ALBUMIN SERPL BCP-MCNC: 3.8 G/DL (ref 3.5–5)
ALP SERPL-CCNC: 54 U/L (ref 46–116)
ALT SERPL W P-5'-P-CCNC: 32 U/L (ref 12–78)
ANION GAP SERPL CALCULATED.3IONS-SCNC: 5 MMOL/L (ref 4–13)
AST SERPL W P-5'-P-CCNC: 22 U/L (ref 5–45)
BASOPHILS # BLD AUTO: 0.06 THOUSANDS/ÂΜL (ref 0–0.1)
BASOPHILS NFR BLD AUTO: 1 % (ref 0–1)
BILIRUB SERPL-MCNC: 0.53 MG/DL (ref 0.2–1)
BUN SERPL-MCNC: 18 MG/DL (ref 5–25)
CALCIUM SERPL-MCNC: 9.7 MG/DL (ref 8.3–10.1)
CHLORIDE SERPL-SCNC: 106 MMOL/L (ref 96–108)
CHOLEST SERPL-MCNC: 140 MG/DL
CO2 SERPL-SCNC: 29 MMOL/L (ref 21–32)
CREAT SERPL-MCNC: 0.86 MG/DL (ref 0.6–1.3)
EOSINOPHIL # BLD AUTO: 0.17 THOUSAND/ÂΜL (ref 0–0.61)
EOSINOPHIL NFR BLD AUTO: 3 % (ref 0–6)
ERYTHROCYTE [DISTWIDTH] IN BLOOD BY AUTOMATED COUNT: 13 % (ref 11.6–15.1)
GFR SERPL CREATININE-BSD FRML MDRD: 70 ML/MIN/1.73SQ M
GLUCOSE P FAST SERPL-MCNC: 92 MG/DL (ref 65–99)
HCT VFR BLD AUTO: 41.2 % (ref 34.8–46.1)
HDLC SERPL-MCNC: 69 MG/DL
HGB BLD-MCNC: 13.4 G/DL (ref 11.5–15.4)
IMM GRANULOCYTES # BLD AUTO: 0.01 THOUSAND/UL (ref 0–0.2)
IMM GRANULOCYTES NFR BLD AUTO: 0 % (ref 0–2)
LDLC SERPL CALC-MCNC: 48 MG/DL (ref 0–100)
LYMPHOCYTES # BLD AUTO: 1.34 THOUSANDS/ÂΜL (ref 0.6–4.47)
LYMPHOCYTES NFR BLD AUTO: 21 % (ref 14–44)
MCH RBC QN AUTO: 29.2 PG (ref 26.8–34.3)
MCHC RBC AUTO-ENTMCNC: 32.5 G/DL (ref 31.4–37.4)
MCV RBC AUTO: 90 FL (ref 82–98)
MONOCYTES # BLD AUTO: 0.51 THOUSAND/ÂΜL (ref 0.17–1.22)
MONOCYTES NFR BLD AUTO: 8 % (ref 4–12)
NEUTROPHILS # BLD AUTO: 4.18 THOUSANDS/ÂΜL (ref 1.85–7.62)
NEUTS SEG NFR BLD AUTO: 67 % (ref 43–75)
NONHDLC SERPL-MCNC: 71 MG/DL
NRBC BLD AUTO-RTO: 0 /100 WBCS
PLATELET # BLD AUTO: 256 THOUSANDS/UL (ref 149–390)
PMV BLD AUTO: 11 FL (ref 8.9–12.7)
POTASSIUM SERPL-SCNC: 3.9 MMOL/L (ref 3.5–5.3)
PROT SERPL-MCNC: 7.9 G/DL (ref 6.4–8.4)
RBC # BLD AUTO: 4.59 MILLION/UL (ref 3.81–5.12)
SODIUM SERPL-SCNC: 140 MMOL/L (ref 135–147)
TRIGL SERPL-MCNC: 116 MG/DL
TSH SERPL DL<=0.05 MIU/L-ACNC: 3.14 UIU/ML (ref 0.45–4.5)
WBC # BLD AUTO: 6.27 THOUSAND/UL (ref 4.31–10.16)

## 2022-11-29 ENCOUNTER — OFFICE VISIT (OUTPATIENT)
Dept: INTERNAL MEDICINE CLINIC | Facility: CLINIC | Age: 67
End: 2022-11-29

## 2022-11-29 VITALS
HEART RATE: 78 BPM | SYSTOLIC BLOOD PRESSURE: 114 MMHG | WEIGHT: 177.2 LBS | HEIGHT: 66 IN | TEMPERATURE: 97.4 F | BODY MASS INDEX: 28.48 KG/M2 | RESPIRATION RATE: 16 BRPM | DIASTOLIC BLOOD PRESSURE: 72 MMHG | OXYGEN SATURATION: 98 %

## 2022-11-29 DIAGNOSIS — M06.09 RHEUMATOID ARTHRITIS OF MULTIPLE SITES WITH NEGATIVE RHEUMATOID FACTOR (HCC): ICD-10-CM

## 2022-11-29 DIAGNOSIS — Z12.11 SCREENING FOR COLORECTAL CANCER: ICD-10-CM

## 2022-11-29 DIAGNOSIS — E55.9 VITAMIN D DEFICIENCY: ICD-10-CM

## 2022-11-29 DIAGNOSIS — Z00.00 MEDICARE ANNUAL WELLNESS VISIT, SUBSEQUENT: ICD-10-CM

## 2022-11-29 DIAGNOSIS — M85.89 OSTEOPENIA OF MULTIPLE SITES: ICD-10-CM

## 2022-11-29 DIAGNOSIS — Z12.12 SCREENING FOR COLORECTAL CANCER: ICD-10-CM

## 2022-11-29 DIAGNOSIS — G89.29 CHRONIC RIGHT-SIDED LOW BACK PAIN WITH RIGHT-SIDED SCIATICA: ICD-10-CM

## 2022-11-29 DIAGNOSIS — M54.41 CHRONIC RIGHT-SIDED LOW BACK PAIN WITH RIGHT-SIDED SCIATICA: ICD-10-CM

## 2022-11-29 DIAGNOSIS — I10 ESSENTIAL HYPERTENSION: Primary | ICD-10-CM

## 2022-11-29 DIAGNOSIS — E78.2 MIXED HYPERLIPIDEMIA: ICD-10-CM

## 2022-11-29 RX ORDER — LEVOCETIRIZINE DIHYDROCHLORIDE 5 MG/1
5 TABLET, FILM COATED ORAL DAILY
COMMUNITY
Start: 2022-11-04

## 2022-11-29 RX ORDER — FLUTICASONE PROPIONATE 50 MCG
SPRAY, SUSPENSION (ML) NASAL
COMMUNITY
Start: 2022-11-04

## 2022-11-29 RX ORDER — OLMESARTAN MEDOXOMIL 40 MG/1
40 TABLET ORAL DAILY
Qty: 90 TABLET | Refills: 3 | Status: SHIPPED | OUTPATIENT
Start: 2022-11-29 | End: 2023-02-27

## 2022-11-29 NOTE — PATIENT INSTRUCTIONS
Medicare Preventive Visit Patient Instructions  Thank you for completing your Welcome to Medicare Visit or Medicare Annual Wellness Visit today  Your next wellness visit will be due in one year (11/30/2023)  The screening/preventive services that you may require over the next 5-10 years are detailed below  Some tests may not apply to you based off risk factors and/or age  Screening tests ordered at today's visit but not completed yet may show as past due  Also, please note that scanned in results may not display below  Preventive Screenings:  Service Recommendations Previous Testing/Comments   Colorectal Cancer Screening  * Colonoscopy    * Fecal Occult Blood Test (FOBT)/Fecal Immunochemical Test (FIT)  * Fecal DNA/Cologuard Test  * Flexible Sigmoidoscopy Age: 39-70 years old   Colonoscopy: every 10 years (may be performed more frequently if at higher risk)  OR  FOBT/FIT: every 1 year  OR  Cologuard: every 3 years  OR  Sigmoidoscopy: every 5 years  Screening may be recommended earlier than age 39 if at higher risk for colorectal cancer  Also, an individualized decision between you and your healthcare provider will decide whether screening between the ages of 74-80 would be appropriate  Colonoscopy: 08/05/2019  FOBT/FIT: Not on file  Cologuard: Not on file  Sigmoidoscopy: Not on file    Screening Current     Breast Cancer Screening Age: 36 years old  Frequency: every 1-2 years  Not required if history of left and right mastectomy Mammogram: 08/22/2022    Screening Current   Cervical Cancer Screening Between the ages of 21-29, pap smear recommended once every 3 years  Between the ages of 33-67, can perform pap smear with HPV co-testing every 5 years     Recommendations may differ for women with a history of total hysterectomy, cervical cancer, or abnormal pap smears in past  Pap Smear: 07/07/2021    Screening Not Indicated   Hepatitis C Screening Once for adults born between 1945 and 1965  More frequently in patients at high risk for Hepatitis C Hep C Antibody: 01/03/2020    Screening Current   Diabetes Screening 1-2 times per year if you're at risk for diabetes or have pre-diabetes Fasting glucose: 92 mg/dL (11/21/2022)  A1C: No results in last 5 years (No results in last 5 years)  Screening Current   Cholesterol Screening Once every 5 years if you don't have a lipid disorder  May order more often based on risk factors  Lipid panel: 11/21/2022    Screening Not Indicated  History Lipid Disorder     Other Preventive Screenings Covered by Medicare:  1  Abdominal Aortic Aneurysm (AAA) Screening: covered once if your at risk  You're considered to be at risk if you have a family history of AAA  2  Lung Cancer Screening: covers low dose CT scan once per year if you meet all of the following conditions: (1) Age 50-69; (2) No signs or symptoms of lung cancer; (3) Current smoker or have quit smoking within the last 15 years; (4) You have a tobacco smoking history of at least 20 pack years (packs per day multiplied by number of years you smoked); (5) You get a written order from a healthcare provider  3  Glaucoma Screening: covered annually if you're considered high risk: (1) You have diabetes OR (2) Family history of glaucoma OR (3)  aged 48 and older OR (3)  American aged 72 and older  3  Osteoporosis Screening: covered every 2 years if you meet one of the following conditions: (1) You're estrogen deficient and at risk for osteoporosis based off medical history and other findings; (2) Have a vertebral abnormality; (3) On glucocorticoid therapy for more than 3 months; (4) Have primary hyperparathyroidism; (5) On osteoporosis medications and need to assess response to drug therapy  · Last bone density test (DXA Scan): 10/27/2022   5  HIV Screening: covered annually if you're between the age of 15-65  Also covered annually if you are younger than 13 and older than 72 with risk factors for HIV infection  For pregnant patients, it is covered up to 3 times per pregnancy  Immunizations:  Immunization Recommendations   Influenza Vaccine Annual influenza vaccination during flu season is recommended for all persons aged >= 6 months who do not have contraindications   Pneumococcal Vaccine   * Pneumococcal conjugate vaccine = PCV13 (Prevnar 13), PCV15 (Vaxneuvance), PCV20 (Prevnar 20)  * Pneumococcal polysaccharide vaccine = PPSV23 (Pneumovax) Adults 25-60 years old: 1-3 doses may be recommended based on certain risk factors  Adults 72 years old: 1-2 doses may be recommended based off what pneumonia vaccine you previously received   Hepatitis B Vaccine 3 dose series if at intermediate or high risk (ex: diabetes, end stage renal disease, liver disease)   Tetanus (Td) Vaccine - COST NOT COVERED BY MEDICARE PART B Following completion of primary series, a booster dose should be given every 10 years to maintain immunity against tetanus  Td may also be given as tetanus wound prophylaxis  Tdap Vaccine - COST NOT COVERED BY MEDICARE PART B Recommended at least once for all adults  For pregnant patients, recommended with each pregnancy  Shingles Vaccine (Shingrix) - COST NOT COVERED BY MEDICARE PART B  2 shot series recommended in those aged 48 and above     Health Maintenance Due:      Topic Date Due   • Colorectal Cancer Screening  08/05/2022   • Breast Cancer Screening: Mammogram  08/22/2023   • DXA SCAN  10/27/2024   • Hepatitis C Screening  Completed     Immunizations Due:      Topic Date Due   • Hepatitis B Vaccine (1 of 3 - 3-dose series) Never done   • Pneumococcal Vaccine: 65+ Years (1 - PCV) Never done   • COVID-19 Vaccine (3 - Booster for Pfizer series) 11/04/2021   • Influenza Vaccine (1) Never done     Advance Directives   What are advance directives? Advance directives are legal documents that state your wishes and plans for medical care   These plans are made ahead of time in case you lose your ability to make decisions for yourself  Advance directives can apply to any medical decision, such as the treatments you want, and if you want to donate organs  What are the types of advance directives? There are many types of advance directives, and each state has rules about how to use them  You may choose a combination of any of the following:  · Living will: This is a written record of the treatment you want  You can also choose which treatments you do not want, which to limit, and which to stop at a certain time  This includes surgery, medicine, IV fluid, and tube feedings  · Durable power of  for healthcare [facility]): This is a written record that states who you want to make healthcare choices for you when you are unable to make them for yourself  This person, called a proxy, is usually a family member or a friend  You may choose more than 1 proxy  · Do not resuscitate (DNR) order:  A DNR order is used in case your heart stops beating or you stop breathing  It is a request not to have certain forms of treatment, such as CPR  A DNR order may be included in other types of advance directives  · Medical directive: This covers the care that you want if you are in a coma, near death, or unable to make decisions for yourself  You can list the treatments you want for each condition  Treatment may include pain medicine, surgery, blood transfusions, dialysis, IV or tube feedings, and a ventilator (breathing machine)  · Values history: This document has questions about your views, beliefs, and how you feel and think about life  This information can help others choose the care that you would choose  Why are advance directives important? An advance directive helps you control your care  Although spoken wishes may be used, it is better to have your wishes written down  Spoken wishes can be misunderstood, or not followed  Treatments may be given even if you do not want them   An advance directive may make it easier for your family to make difficult choices about your care  Weight Management   Why it is important to manage your weight:  Being overweight increases your risk of health conditions such as heart disease, high blood pressure, type 2 diabetes, and certain types of cancer  It can also increase your risk for osteoarthritis, sleep apnea, and other respiratory problems  Aim for a slow, steady weight loss  Even a small amount of weight loss can lower your risk of health problems  How to lose weight safely:  A safe and healthy way to lose weight is to eat fewer calories and get regular exercise  You can lose up about 1 pound a week by decreasing the number of calories you eat by 500 calories each day  Healthy meal plan for weight management:  A healthy meal plan includes a variety of foods, contains fewer calories, and helps you stay healthy  A healthy meal plan includes the following:  · Eat whole-grain foods more often  A healthy meal plan should contain fiber  Fiber is the part of grains, fruits, and vegetables that is not broken down by your body  Whole-grain foods are healthy and provide extra fiber in your diet  Some examples of whole-grain foods are whole-wheat breads and pastas, oatmeal, brown rice, and bulgur  · Eat a variety of vegetables every day  Include dark, leafy greens such as spinach, kale, bethany greens, and mustard greens  Eat yellow and orange vegetables such as carrots, sweet potatoes, and winter squash  · Eat a variety of fruits every day  Choose fresh or canned fruit (canned in its own juice or light syrup) instead of juice  Fruit juice has very little or no fiber  · Eat low-fat dairy foods  Drink fat-free (skim) milk or 1% milk  Eat fat-free yogurt and low-fat cottage cheese  Try low-fat cheeses such as mozzarella and other reduced-fat cheeses  · Choose meat and other protein foods that are low in fat  Choose beans or other legumes such as split peas or lentils   Choose fish, skinless poultry (chicken or turkey), or lean cuts of red meat (beef or pork)  Before you cook meat or poultry, cut off any visible fat  · Use less fat and oil  Try baking foods instead of frying them  Add less fat, such as margarine, sour cream, regular salad dressing and mayonnaise to foods  Eat fewer high-fat foods  Some examples of high-fat foods include french fries, doughnuts, ice cream, and cakes  · Eat fewer sweets  Limit foods and drinks that are high in sugar  This includes candy, cookies, regular soda, and sweetened drinks  Exercise:  Exercise at least 30 minutes per day on most days of the week  Some examples of exercise include walking, biking, dancing, and swimming  You can also fit in more physical activity by taking the stairs instead of the elevator or parking farther away from stores  Ask your healthcare provider about the best exercise plan for you  © Copyright [employer] 2018 Information is for End User's use only and may not be sold, redistributed or otherwise used for commercial purposes   All illustrations and images included in CareNotes® are the copyrighted property of [employer]  or [address] [place] [address]
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
## 2022-12-06 ENCOUNTER — EVALUATION (OUTPATIENT)
Dept: PHYSICAL THERAPY | Facility: CLINIC | Age: 67
End: 2022-12-06

## 2022-12-06 DIAGNOSIS — G89.29 CHRONIC RIGHT-SIDED LOW BACK PAIN WITH RIGHT-SIDED SCIATICA: ICD-10-CM

## 2022-12-06 DIAGNOSIS — M54.41 CHRONIC RIGHT-SIDED LOW BACK PAIN WITH RIGHT-SIDED SCIATICA: ICD-10-CM
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
## 2022-12-07 ENCOUNTER — OFFICE VISIT (OUTPATIENT)
Dept: PHYSICAL THERAPY | Facility: CLINIC | Age: 67
End: 2022-12-07

## 2022-12-07 DIAGNOSIS — G89.29 CHRONIC RIGHT-SIDED LOW BACK PAIN WITH RIGHT-SIDED SCIATICA: Primary | ICD-10-CM

## 2022-12-07 DIAGNOSIS — M54.41 CHRONIC RIGHT-SIDED LOW BACK PAIN WITH RIGHT-SIDED SCIATICA: Primary | ICD-10-CM

## 2022-12-07 NOTE — PROGRESS NOTES
Daily Note     Today's date: 2022  Patient name: Elyse Velazquez  : 1955  MRN: [id number]  Referring provider: Jennifer Moore MD  Dx:   Encounter Diagnosis     ICD-10-CM    1  Chronic right-sided low back pain with right-sided sciatica  M54 41     G89 29           Start Time: 1115  Stop Time: 1200  Total time in clinic (min): 45 minutes    Subjective: Patient reports that she did her exercises at home last night and the pain seems to be centralizing  Patient states that her pain is at a 5/10 today  Objective: See treatment diary below      Assessment: Recumbent bike performed this session in order to improve cardiovascualr endurance  Continued with prone press ups as the pain appears to be centralizing per patient report  Added supine nerve glides to address radiating leg pain  Core stabilization exercises added for proper core engagement in preparation of forward bending and lifting  Patient responded very well to prone press up with overpressure, reporting no symptoms in the R leg  Tolerated treatment well  Patient would benefit from continued PT      Plan: Continue per plan of care        Precautions: decreased sensation in L LE      Manuals            PA mobs  10 min           Extension w overpressure  x10                                     Neuro Re-Ed             Pallof press  2x15           Supine sciatic nerve glide  x15           TA contraction  10x5"           TA+ march  10 ea                                                  Ther Ex             Prone press up x10 2x20           Recumbent bike  6 min           Bridge  2x15           Standing hip ABD, ext  x20                                                               Ther Activity             [place] carry             Suitcase carry  10# 6 laps           Gait Training                                       Modalities

## 2022-12-08 ENCOUNTER — TELEPHONE (OUTPATIENT)
Dept: OTHER | Facility: OTHER | Age: 67
End: 2022-12-08

## 2022-12-08 ENCOUNTER — PREP FOR PROCEDURE (OUTPATIENT)
Dept: GASTROENTEROLOGY | Facility: CLINIC | Age: 67
End: 2022-12-08

## 2022-12-08 DIAGNOSIS — Z86.010 HISTORY OF COLON POLYPS: Primary | ICD-10-CM

## 2022-12-08 DIAGNOSIS — Z12.11 SCREENING FOR COLON CANCER: ICD-10-CM

## 2022-12-08 NOTE — TELEPHONE ENCOUNTER
Patient last colonoscopy 08/05/2019  Patient is a 3 year recall  Patient has referral in system   Please call Sabine Herrera at [phone]

## 2022-12-08 NOTE — TELEPHONE ENCOUNTER
Call from patient advising she has been waiting for a callback to schedule her colonoscopy  Please return her call

## 2022-12-08 NOTE — TELEPHONE ENCOUNTER
Ptn passed OA in referral  Booking info is at follows:    Scheduled date of colonoscopy (as of today): 2/15/23  Physician performing colonoscopy: Raf  Location of colonoscopy: [facility]  Bowel prep reviewed with patient: Miralax- mailed  Instructions reviewed with patient by: Tenisha TOTH  Clearances:

## 2022-12-13 ENCOUNTER — OFFICE VISIT (OUTPATIENT)
Dept: PHYSICAL THERAPY | Facility: CLINIC | Age: 67
End: 2022-12-13

## 2022-12-13 DIAGNOSIS — G89.29 CHRONIC RIGHT-SIDED LOW BACK PAIN WITH RIGHT-SIDED SCIATICA: Primary | ICD-10-CM

## 2022-12-13 DIAGNOSIS — M54.41 CHRONIC RIGHT-SIDED LOW BACK PAIN WITH RIGHT-SIDED SCIATICA: Primary | ICD-10-CM

## 2022-12-13 NOTE — PROGRESS NOTES
Daily Note     Today's date: 2022  Patient name: Josi Ballesteros  : 1955  MRN: [id number]  Referring provider: Krissy Young MD  Dx:   Encounter Diagnosis     ICD-10-CM    1  Chronic right-sided low back pain with right-sided sciatica  M54 41     G89 29           Start Time: 09  Stop Time: 1030  Total time in clinic (min): 47 minutes    Subjective: Patient reports that her LBP and R leg pain flared up on her on Friday and hasn't subsided since then  Patient states that the pain is 9/10 today  Patient reports that she is unable to do her HEP due to pain  Patient is unable to perform recumbent bike today due to pain  Objective: See treatment diary below      Assessment: Patient demonstrates antalagic gait pattern on R LE and decreased functional mobility this session  Patient completed today's session with decrease in pain  Extension based exercise such as prone press ups continue to decrease leg pain  PPU with therapist over pressure decreased pain from 9/10 to 4/10  Patient educated on importance of performing PPU every 2 hours as able  Supine nerve glide gave patient some relief as well  Future sessions should continue with extension progression as able  Tolerated treatment fair  Patient would benefit from continued PT        Plan: Continue per plan of care        Precautions: decreased sensation in L LE      Manuals           PA mobs  10 min 8 min          Extension w overpressure  x10 2x10                                    Neuro Re-Ed             Pallof press  2x15 2x15          Supine sciatic nerve glide  x15 2x15          TA contraction  10x5" x10  5" hold          TA+ march  10 ea 10 ea                                                 Ther Ex             Prone press up x10 2x20 x30          Recumbent bike  6 min def          Bridge  2x15 2x15          Standing hip ABD, ext  x20 x20                                                              Ther Activity [place] carry             Suitcase carry  10# 6 laps           Gait Training                                       Modalities

## 2022-12-14 ENCOUNTER — OFFICE VISIT (OUTPATIENT)
Dept: PHYSICAL THERAPY | Facility: CLINIC | Age: 67
End: 2022-12-14

## 2022-12-14 DIAGNOSIS — M54.41 CHRONIC RIGHT-SIDED LOW BACK PAIN WITH RIGHT-SIDED SCIATICA: Primary | ICD-10-CM

## 2022-12-14 DIAGNOSIS — G89.29 CHRONIC RIGHT-SIDED LOW BACK PAIN WITH RIGHT-SIDED SCIATICA: Primary | ICD-10-CM

## 2022-12-14 NOTE — PROGRESS NOTES
Daily Note     Today's date: 2022  Patient name: Nelli Alvarez  : 1955  MRN: [id number]  Referring provider: Lj Rivera MD  Dx:   Encounter Diagnosis     ICD-10-CM    1  Chronic right-sided low back pain with right-sided sciatica  M54 41     G89 29           Start Time: 1115  Stop Time: 1200  Total time in clinic (min): 45 minutes    Subjective: Patient reports that her back is feeling better than yesterday  Patient states that she was able to go up and down the stairs in her house 3 times this morning with less difficulty  Objective: See treatment diary below      Assessment: Patient demonstrates improving activity tolerance this session  Patient completed today's session without increase in pain  Prone press up continues to be very effective at centralizing symptoms  Continue overpressure from therapist with PPU  Future sessions should continue to progress core stabilization exercises as able  Tolerated treatment well  Patient would benefit from continued PT        Plan: Continue per plan of care        Precautions: decreased sensation in L LE      Manuals          PA mobs  10 min 8 min GS         Extension w overpressure  x10 2x10 GS                                   Neuro Re-Ed             Pallof press  2x15 2x15 2x15 rtb         Supine sciatic nerve glide  x15 2x15 2x15         TA contraction  10x5" x10  5" hold x10  5" hold         TA+ march  10 ea 10 ea 10 ea                                                Ther Ex             Prone press up x10 2x20 x30 x30         Recumbent bike  6 min def def         Bridge  2x15 2x15 2x15         Standing hip ABD, ext  x20 x20 x20                                                             Ther Activity             Mason carry             Suitcase carry  10# 6 laps  10# 6 laps         Gait Training                                       Modalities

## 2022-12-15 ENCOUNTER — APPOINTMENT (OUTPATIENT)
Dept: PHYSICAL THERAPY | Facility: CLINIC | Age: 67
End: 2022-12-15

## 2022-12-20 ENCOUNTER — OFFICE VISIT (OUTPATIENT)
Dept: PHYSICAL THERAPY | Facility: CLINIC | Age: 67
End: 2022-12-20

## 2022-12-20 DIAGNOSIS — M54.41 CHRONIC RIGHT-SIDED LOW BACK PAIN WITH RIGHT-SIDED SCIATICA: Primary | ICD-10-CM

## 2022-12-20 DIAGNOSIS — G89.29 CHRONIC RIGHT-SIDED LOW BACK PAIN WITH RIGHT-SIDED SCIATICA: Primary | ICD-10-CM

## 2022-12-20 NOTE — PROGRESS NOTES
Daily Note     Today's date: 2022  Patient name: Erin De La Rosa  : 1955  MRN: [id number]  Referring provider: Gaston Vallejo MD  Dx:   Encounter Diagnosis     ICD-10-CM    1  Chronic right-sided low back pain with right-sided sciatica  M54 41     G89 29           Start Time: 901  Stop Time: 945  Total time in clinic (min): 44 minutes    Subjective: Patient reports that she has been feeling some on an off back pain  Patient states that she is having bilateral knee pain that has been bothering her since last weekend  Objective: See treatment diary below      Assessment: Patient demonstrates improving lumbar ROM and LE strength this session  Patient completed today's session without increase in pain  BLE strengthening exercises progressed in weight to address patient's reported knee pain  Supine nerve glide gives patient immediate relief after completing exercise  Patient continues to respond to extension preference exercises, especially with overpressure from therapist   Future sessions should continue to progress lumbar ROM and LE strengthening exercises as able  Tolerated treatment well  Patient would benefit from continued PT        Plan: Continue per plan of care        Precautions: decreased sensation in L LE      Manuals         PA mobs  10 min 8 min GS GS        Extension w overpressure  x10 2x10 GS GS                                  Neuro Re-Ed             Pallof press  2x15 2x15 2x15 rtb 2x15        Supine sciatic nerve glide  x15 2x15 2x15 2x15        TA contraction  10x5" x10  5" hold x10  5" hold x20  5" hold        TA+ march  10 ea 10 ea 10 ea                                                Ther Ex             Prone press up x10 2x20 x30 x30 x30        Recumbent bike  6 min def def         Bridge  2x15 2x15 2x15 2x15        Standing hip ABD, ext  x20 x20 x20 2x10  #2                                                            Ther Activity STS     x10        Suitcase carry  10# 6 laps  10# 6 laps         Gait Training                                       Modalities

## 2022-12-22 ENCOUNTER — OFFICE VISIT (OUTPATIENT)
Dept: PHYSICAL THERAPY | Facility: CLINIC | Age: 67
End: 2022-12-22

## 2022-12-22 DIAGNOSIS — G89.29 CHRONIC RIGHT-SIDED LOW BACK PAIN WITH RIGHT-SIDED SCIATICA: Primary | ICD-10-CM

## 2022-12-22 DIAGNOSIS — M54.41 CHRONIC RIGHT-SIDED LOW BACK PAIN WITH RIGHT-SIDED SCIATICA: Primary | ICD-10-CM

## 2022-12-22 NOTE — PROGRESS NOTES
Daily Note     Today's date: 2022  Patient name: Jim Carlos  : 1955  MRN: [id number]  Referring provider: Waneta Goodell, MD  Dx:   Encounter Diagnosis     ICD-10-CM    1  Chronic right-sided low back pain with right-sided sciatica  M54 41     G89 29           Start Time: 945  Stop Time: 1029  Total time in clinic (min): 44 minutes    Subjective: Patient reports that her back is a little flared up today  Patient requests to ride the bike this session in order to help her negotiate stairs at home  Objective: See treatment diary below      Assessment: Patient demonstrates consistent lumbar ROM and activity tolerance this session  Patient completed today's session without increase in pain  Recumbent bike performed in order to improve LE muscular endurance  Step ups added this session to improve patient's tolerance to negotiate stairs  Pallof press progressed in resistance to promote further core stabilization  Future sessions should continue to progress lumbar ROM and core stabilization exercises as able  Tolerated treatment well  Patient would benefit from continued PT        Plan: Continue per plan of care        Precautions: decreased sensation in L LE      Manuals        PA mobs  10 min 8 min GS GS GS       Extension w overpressure  x10 2x10 GS GS GS                                 Neuro Re-Ed             Pallof press  2x15 2x15 2x15 rtb 2x15 2x15  gtb       Supine sciatic nerve glide  x15 2x15 2x15 2x15 2x15       TA contraction  10x5" x10  5" hold x10  5" hold x20  5" hold x20  5" hold       TA+ march  10 ea 10 ea 10 ea                                                Ther Ex             Prone press up x10 2x20 x30 x30 x30 x30       Recumbent bike  6 min def def  6 min       Bridge  2x15 2x15 2x15 2x15 2x15       Standing hip ABD, ext  x20 x20 x20 2x10  #2 2x10  2#                                                           Ther Activity STS     x10 2x10       Step up      x10 ea  4 in       Suitcase carry  10# 6 laps  10# 6 laps         Gait Training                                       Modalities

## 2022-12-27 ENCOUNTER — OFFICE VISIT (OUTPATIENT)
Dept: PHYSICAL THERAPY | Facility: CLINIC | Age: 67
End: 2022-12-27

## 2022-12-27 DIAGNOSIS — G89.29 CHRONIC RIGHT-SIDED LOW BACK PAIN WITH RIGHT-SIDED SCIATICA: Primary | ICD-10-CM

## 2022-12-27 DIAGNOSIS — M54.41 CHRONIC RIGHT-SIDED LOW BACK PAIN WITH RIGHT-SIDED SCIATICA: Primary | ICD-10-CM

## 2022-12-27 NOTE — PROGRESS NOTES
Daily Note     Today's date: 2022  Patient name: Polina Hernández  : 1955  MRN: [id number]  Referring provider: Crispin Galeas MD  Dx:   Encounter Diagnosis     ICD-10-CM    1  Chronic right-sided low back pain with right-sided sciatica  M54 41     G89 29           Start Time: 947  Stop Time: 1033  Total time in clinic (min): 46 minutes    Subjective: Patient reports that she has not been having any radiating symptoms down into her R leg  Patient does report that pain in her low back seems to have intensified a bit  Objective: See treatment diary below      Assessment: Patient demonstrates improving lumbar ROM and activity tolerance this session  Patient completed today's session without increase in pain  Recumbent bike performed in order to improve LE muscular endurance  Patient education provided on centralizing pain- increased pain in the low back but decreased pain down the leg shows positive response to extension exercises  Future sessions should continue to progress extension based exercises as able  Tolerated treatment well  Patient would benefit from continued PT        Plan: Continue per plan of care        Precautions: decreased sensation in L LE      Manuals       PA mobs  10 min 8 min GS GS GS GS      Extension w overpressure  x10 2x10 GS GS GS GS                                Neuro Re-Ed             Pallof press  2x15 2x15 2x15 rtb 2x15 2x15  gtb 2x15  gtb      Supine sciatic nerve glide  x15 2x15 2x15 2x15 2x15 2x15      TA contraction  10x5" x10  5" hold x10  5" hold x20  5" hold x20  5" hold       TA+ march  10 ea 10 ea 10 ea   10 ea                                             Ther Ex             Prone press up x10 2x20 x30 x30 x30 x30 x30      Recumbent bike  6 min def def  6 min 6 min      Bridge  2x15 2x15 2x15 2x15 2x15 2x15      Standing hip ABD, ext  x20 x20 x20 2x10  #2 2x10  2# 2x10  2 5# Ther Activity             STS     x10 2x10       Step up      x10 ea  4 in x20 ea 4 in      Suitcase carry  10# 6 laps  10# 6 laps         Gait Training                                       Modalities

## 2022-12-29 ENCOUNTER — OFFICE VISIT (OUTPATIENT)
Dept: PHYSICAL THERAPY | Facility: CLINIC | Age: 67
End: 2022-12-29

## 2022-12-29 DIAGNOSIS — M54.41 CHRONIC RIGHT-SIDED LOW BACK PAIN WITH RIGHT-SIDED SCIATICA: Primary | ICD-10-CM

## 2022-12-29 DIAGNOSIS — G89.29 CHRONIC RIGHT-SIDED LOW BACK PAIN WITH RIGHT-SIDED SCIATICA: Primary | ICD-10-CM

## 2022-12-29 NOTE — PROGRESS NOTES
Daily Note     Today's date: 2022  Patient name: Paul Watson  : 1955  MRN: [id number]  Referring provider: Booker Villafuerte MD  Dx:   Encounter Diagnosis     ICD-10-CM    1  Chronic right-sided low back pain with right-sided sciatica  M54 41     G89 29           Start Time: 946  Stop Time: 1032  Total time in clinic (min): 46 minutes    Subjective: Patient reports that she is mostly having knee pain unrelated to the back at this point  The pain radiating down her leg has completely resolved  She reports slight midline back pain  Objective: See treatment diary below      Assessment: Patient demonstrates improving core stability and activity tolerance this session  Patient completed today's session without pain  Recumbent bike performed in order to improve LE muscular endurance  PPU with therapist overpressure continues to provide immediate relief  Future sessions should continue to progress lumbar ROM and core stabilization exercises as able  Tolerated treatment well  Patient would benefit from continued PT        Plan: Continue per plan of care        Precautions: decreased sensation in L LE      Manuals      PA mobs  10 min 8 min GS GS GS GS GS     Extension w overpressure  x10 2x10 GS GS GS GS GS                               Neuro Re-Ed             Pallof press  2x15 2x15 2x15 rtb 2x15 2x15  gtb 2x15  gtb 2x15  btb     Supine sciatic nerve glide  x15 2x15 2x15 2x15 2x15 2x15 2x15     TA contraction  10x5" x10  5" hold x10  5" hold x20  5" hold x20  5" hold       TA+ march  10 ea 10 ea 10 ea   10 ea 10 ea                                            Ther Ex             Prone press up x10 2x20 x30 x30 x30 x30 x30 x30     Recumbent bike  6 min def def  6 min 6 min 6 min     Bridge  2x15 2x15 2x15 2x15 2x15 2x15 2x15     Standing hip ABD, ext  x20 x20 x20 2x10  #2 2x10  2# 2x10  2 5# 2x10  2 5# Ther Activity             STS     x10 2x10  2x10     Step up      x10 ea  4 in x20 ea 4 in x20 ea 4 in     Suitcase carry  10# 6 laps  10# 6 laps         Gait Training                                       Modalities

## 2023-01-03 ENCOUNTER — EVALUATION (OUTPATIENT)
Dept: PHYSICAL THERAPY | Facility: CLINIC | Age: 68
End: 2023-01-03

## 2023-01-03 DIAGNOSIS — M54.41 CHRONIC RIGHT-SIDED LOW BACK PAIN WITH RIGHT-SIDED SCIATICA: Primary | ICD-10-CM

## 2023-01-03 DIAGNOSIS — G89.29 CHRONIC RIGHT-SIDED LOW BACK PAIN WITH RIGHT-SIDED SCIATICA: Primary | ICD-10-CM
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
## 2023-01-09 ENCOUNTER — OFFICE VISIT (OUTPATIENT)
Dept: PHYSICAL THERAPY | Facility: CLINIC | Age: 68
End: 2023-01-09

## 2023-01-09 DIAGNOSIS — G89.29 CHRONIC RIGHT-SIDED LOW BACK PAIN WITH RIGHT-SIDED SCIATICA: Primary | ICD-10-CM

## 2023-01-09 DIAGNOSIS — M54.41 CHRONIC RIGHT-SIDED LOW BACK PAIN WITH RIGHT-SIDED SCIATICA: Primary | ICD-10-CM

## 2023-01-09 NOTE — PROGRESS NOTES
Daily Note     Today's date: 2023  Patient name: Mckay Umaña  : 1955  MRN: [id number]  Referring provider: Fatemeh Trejo MD  Dx:   Encounter Diagnosis     ICD-10-CM    1  Chronic right-sided low back pain with right-sided sciatica  M54 41     G89 29           Start Time: 1107  Stop Time: 1151  Total time in clinic (min): 44 minutes    Subjective: Patient reports that her back and sciatica have significantly flared up on her  Patient states that she feels like she is all the way back to the beginning and the pain is as intense as when she started therapy  Objective: See treatment diary below      Assessment: Patient demonstrates decreased lumbar ROM and activity tolerance this session  Patient completed today's session without increase or decrease in pain  Unable to perform recumbent bike due to pain  Patient demonstrates significant neural irritation and reports that it has been ongoing for the past 3 days  Slump test on R remains negative but prone knee flexion recreates radiating symptoms  Gave patient prone nerve glide for HEP  Future sessions should continue to progress neural desensitization exercises as able  Tolerated treatment well  Patient would benefit from continued PT        Plan: Continue per plan of care        Precautions: decreased sensation in L LE    Manuals 12/6 12/7 12/12 12/14 12/20 12/22 12/27 12/29 1/3 1/9   PA mobs  10 min 8 min GS GS GS GS GS  GS   Extension w overpressure  x10 2x10 GS GS GS GS GS                               Neuro Re-Ed             Pallof press  2x15 2x15 2x15 rtb 2x15 2x15  gtb 2x15  gtb 2x15  btb 2x15  btb 2x15  gtb   Supine sciatic nerve glide  x15 2x15 2x15 2x15 2x15 2x15 2x15 2x15 2x15 manually by therapist   Supine rotation nerve glide          2x15   TA contraction  10x5" x10  5" hold x10  5" hold x20  5" hold x20  5" hold   x20 5" hold x20 5"  hold   TA+ march  10 ea 10 ea 10 ea   10 ea 10 ea 10 ea 10 ea   Prone nerve glide 2x15                             Ther Ex             Prone press up x10 2x20 x30 x30 x30 x30 x30 x30 x30 x30   Recumbent bike  6 min def def  6 min 6 min 6 min 6 min def   Bridge  2x15 2x15 2x15 2x15 2x15 2x15 2x15 2x15    Standing hip ABD, ext  x20 x20 x20 2x10  #2 2x10  2# 2x10  2 5# 2x10  2 5# 2x10  2 5#                                                          Ther Activity             STS     x10 2x10  2x10     Step up      x10 ea  4 in x20 ea 4 in x20 ea 4 in x20 ea 4 in    Suitcase carry  10# 6 laps  10# 6 laps         Gait Training                                       Modalities

## 2023-01-12 ENCOUNTER — OFFICE VISIT (OUTPATIENT)
Dept: PHYSICAL THERAPY | Facility: CLINIC | Age: 68
End: 2023-01-12

## 2023-01-12 DIAGNOSIS — M54.41 CHRONIC RIGHT-SIDED LOW BACK PAIN WITH RIGHT-SIDED SCIATICA: Primary | ICD-10-CM

## 2023-01-12 DIAGNOSIS — G89.29 CHRONIC RIGHT-SIDED LOW BACK PAIN WITH RIGHT-SIDED SCIATICA: Primary | ICD-10-CM

## 2023-01-12 NOTE — PROGRESS NOTES
Daily Note     Today's date: 2023  Patient name: Ryan Trevino  : 1955  MRN: [id number]  Referring provider: Rachel Arshad MD  Dx:   Encounter Diagnosis     ICD-10-CM    1  Chronic right-sided low back pain with right-sided sciatica  M54 41     G89 29           Start Time: 1130  Stop Time: 1217  Total time in clinic (min): 47 minutes    Subjective: Patient reports that she is feeling better today  Patient states that she talked to her doctor about her recent flare up and was told it could be related to her RA  Patient has an appointment schedule with an RA specialist   Pain at 7/10 today  Objective: See treatment diary below      Assessment: Patient demonstrates slight improvement in activity tolerance this session  Patient completed today's session with increase in pain during nerve tensioning exercises that subsides with rest   Able to tolerate recumbent bike to provide neural desensitization  Again focused on nerve gliding exercises and re-introduced LE strengthening  Future sessions should continue to progress nerve desensitization and hip strengthening exercises as able  Tolerated treatment well  Patient would benefit from continued PT        Plan: Continue per plan of care        Precautions: decreased sensation in L LE    Manuals 1/12 12/7 12/12 12/14 12/20 12/22 12/27 12/29 1/3 1/9   PA mobs GS 10 min 8 min GS GS GS GS GS  GS   Extension w overpressure x10 x10 2x10 GS GS GS GS GS                               Neuro Re-Ed             Pallof press 2x15 btb 2x15 2x15 2x15 rtb 2x15 2x15  gtb 2x15  gtb 2x15  btb 2x15  btb 2x15  gtb   Supine sciatic nerve glide 2x15 x15 2x15 2x15 2x15 2x15 2x15 2x15 2x15 2x15 manually by therapist   Supine rotation nerve glide 2x15         2x15   TA contraction 20x5" hold 10x5" x10  5" hold x10  5" hold x20  5" hold x20  5" hold   x20 5" hold x20 5"  hold   TA+  ea 10 ea 10 ea 10 ea   10 ea 10 ea 10 ea 10 ea   Prone nerve glide 2x10 2x15                             Ther Ex             Prone press up x30 2x20 x30 x30 x30 x30 x30 x30 x30 x30   Recumbent bike 6 min 6 min def def  6 min 6 min 6 min 6 min def   Bridge x30 2x15 2x15 2x15 2x15 2x15 2x15 2x15 2x15    Standing hip ABD, ext 2x15  #2 x20 x20 x20 2x10  #2 2x10  2# 2x10  2 5# 2x10  2 5# 2x10  2 5#                                                          Ther Activity             STS     x10 2x10  2x10     Step up      x10 ea  4 in x20 ea 4 in x20 ea 4 in x20 ea 4 in    Suitcase carry  10# 6 laps  10# 6 laps         Gait Training                                       Modalities

## 2023-01-16 ENCOUNTER — OFFICE VISIT (OUTPATIENT)
Dept: PHYSICAL THERAPY | Facility: CLINIC | Age: 68
End: 2023-01-16

## 2023-01-16 ENCOUNTER — OFFICE VISIT (OUTPATIENT)
Dept: INTERNAL MEDICINE CLINIC | Facility: CLINIC | Age: 68
End: 2023-01-16

## 2023-01-16 VITALS
HEART RATE: 86 BPM | WEIGHT: 178.6 LBS | RESPIRATION RATE: 16 BRPM | DIASTOLIC BLOOD PRESSURE: 78 MMHG | SYSTOLIC BLOOD PRESSURE: 130 MMHG | TEMPERATURE: 97.7 F | BODY MASS INDEX: 28.7 KG/M2 | HEIGHT: 66 IN | OXYGEN SATURATION: 98 %

## 2023-01-16 DIAGNOSIS — M06.09 RHEUMATOID ARTHRITIS OF MULTIPLE SITES WITH NEGATIVE RHEUMATOID FACTOR (HCC): Primary | ICD-10-CM

## 2023-01-16 DIAGNOSIS — E21.3 HYPERPARATHYROIDISM (HCC): ICD-10-CM

## 2023-01-16 DIAGNOSIS — R76.8 POSITIVE DOUBLE STRANDED DNA ANTIBODY TEST: ICD-10-CM

## 2023-01-16 DIAGNOSIS — G89.29 CHRONIC RIGHT-SIDED LOW BACK PAIN WITH RIGHT-SIDED SCIATICA: Primary | ICD-10-CM

## 2023-01-16 DIAGNOSIS — M06.00 SERONEGATIVE RHEUMATOID ARTHRITIS (HCC): ICD-10-CM

## 2023-01-16 DIAGNOSIS — M54.41 CHRONIC RIGHT-SIDED LOW BACK PAIN WITH RIGHT-SIDED SCIATICA: Primary | ICD-10-CM

## 2023-01-16 RX ORDER — HYDROXYCHLOROQUINE SULFATE 200 MG/1
200 TABLET, FILM COATED ORAL 2 TIMES DAILY WITH MEALS
Qty: 180 TABLET | Refills: 1 | Status: SHIPPED | OUTPATIENT
Start: 2023-01-16 | End: 2023-04-16
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
## 2023-01-16 NOTE — PROGRESS NOTES
INTERNAL MEDICINE FOLLOW-UP OFFICE VISIT  [facility]    NAME: Kera Shabazz  AGE: 78 y o  SEX: female  : 1955   MRN: [id number]    DATE: 2023  TIME: 4:32 PM    Assessment and Plan     Diagnoses and all orders for this visit:    Rheumatoid arthritis of multiple sites with negative rheumatoid factor ([facility] [address] )  Patient has not been taking the hydroxychloroquine for the last 1 month as the pharmacy would not give it to her  She started getting joint pains all over her body  The medication was sent to the pharmacy and she was told to start taking it so her symptoms get better  In 2 months she is going to see the rheumatologist    Seronegative rheumatoid arthritis ([facility] [address] )  -     hydroxychloroquine (PLAQUENIL) 200 mg tablet; Take 1 tablet (200 mg total) by mouth 2 (two) times a day with meals    Positive double stranded DNA antibody test  -     hydroxychloroquine (PLAQUENIL) 200 mg tablet; Take 1 tablet (200 mg total) by mouth 2 (two) times a day with meals    Hyperparathyroidism ([facility] [address] )        - Counseling Documentation: patient was counseled regarding: instructions for management, risk factor reductions, prognosis, patient and family education, risks and benefits of treatment options and importance of compliance with treatment  - Medication Side Effects: Adverse side effects of medications were reviewed with the patient/guardian today  Return to office in: as needed    Chief Complaint     Chief Complaint   Patient presents with   • Back Pain     Lower back        History of Present Illness     HPI  Patient has not been taking her hydroxychloroquine for about a month and now she started with symptoms of joint pains      The following portions of the patient's history were reviewed and updated as appropriate: allergies, current medications, past family history, past medical history, past social history, past surgical history and problem list     Review of Systems Review of Systems   Constitutional: Negative for chills, diaphoresis, fatigue and fever  HENT: Negative for congestion, ear discharge, ear pain, hearing loss, postnasal drip, rhinorrhea, sinus pressure, sinus pain, sneezing, sore throat and voice change  Eyes: Negative for pain, discharge, redness and visual disturbance  Respiratory: Negative for cough, chest tightness, shortness of breath and wheezing  Cardiovascular: Negative for chest pain, palpitations and leg swelling  Gastrointestinal: Negative for abdominal distention, abdominal pain, blood in stool, constipation, diarrhea, nausea and vomiting  Endocrine: Negative for cold intolerance, heat intolerance, polydipsia, polyphagia and polyuria  Genitourinary: Negative for dysuria, flank pain, frequency, hematuria and urgency  Musculoskeletal: Positive for arthralgias, back pain and neck stiffness  Negative for gait problem, joint swelling, myalgias and neck pain  Skin: Negative for rash  Neurological: Negative for dizziness, tremors, syncope, facial asymmetry, speech difficulty, weakness, light-headedness, numbness and headaches  Hematological: Does not bruise/bleed easily  Psychiatric/Behavioral: Negative for behavioral problems, confusion and sleep disturbance  The patient is not nervous/anxious          Active Problem List     Patient Active Problem List   Diagnosis   • Essential hypertension   • Mixed hyperlipidemia   • Bilateral post-traumatic osteoarthritis of knee   • Idiopathic peripheral neuropathy   • Vitamin D deficiency   • Overweight   • Hypercalcemia   • Hyperparathyroidism (HCC)   • Arthritis   • Bilateral hand pain   • Chronic pain of both shoulders   • Rheumatoid arthritis of multiple sites with negative rheumatoid factor (HCC)   • Osteopenia of multiple sites   • Chronic right-sided low back pain with right-sided sciatica       Objective     /78 (BP Location: Left arm, Patient Position: Sitting, Cuff Size: Standard)   Pulse 86   Temp 97 7 °F (36 5 °C) (Tympanic)   Resp 16   Ht 5' 6" (1 676 m)   Wt 81 kg (178 lb 9 6 oz)   LMP  (LMP Unknown)   SpO2 98%   BMI 28 83 kg/m²     Physical Exam  Constitutional:       General: She is not in acute distress  Appearance: She is well-developed  She is not diaphoretic  HENT:      Head: Normocephalic and atraumatic  Right Ear: External ear normal       Left Ear: External ear normal       Nose: Nose normal    Eyes:      General: No scleral icterus  Right eye: No discharge  Left eye: No discharge  Conjunctiva/sclera: Conjunctivae normal    Neck:      Thyroid: No thyromegaly  Vascular: No JVD  Trachea: No tracheal deviation  Cardiovascular:      Rate and Rhythm: Normal rate and regular rhythm  Heart sounds: Normal heart sounds  No murmur heard  No friction rub  No gallop  Pulmonary:      Effort: Pulmonary effort is normal  No respiratory distress  Breath sounds: Normal breath sounds  No wheezing or rales  Chest:      Chest wall: No tenderness  Abdominal:      General: Bowel sounds are normal  There is no distension  Palpations: Abdomen is soft  Tenderness: There is no abdominal tenderness  There is no guarding or rebound  Musculoskeletal:         General: Tenderness present  Normal range of motion  Cervical back: Normal range of motion and neck supple  Lymphadenopathy:      Cervical: No cervical adenopathy  Skin:     General: Skin is warm and dry  Findings: No erythema or rash  Neurological:      Mental Status: She is alert and oriented to person, place, and time  Cranial Nerves: No cranial nerve deficit  Motor: No abnormal muscle tone        Coordination: Coordination normal    Psychiatric:         Judgment: Judgment normal            Current Medications       Current Outpatient Medications:   •  B Complex Vitamins (B COMPLEX 1 PO), Take by mouth, Disp: , Rfl:   •  cholecalciferol (VITAMIN D3) 1,000 units tablet, Take 1,000 Units by mouth daily, Disp: , Rfl:   •  Emollient (COLLAGEN EX), Apply topically, Disp: , Rfl:   •  fluticasone (FLONASE) 50 mcg/act nasal spray, SPRAY 1 SQUIRT INTO THE NOSTRILS TWICE DAILY, Disp: , Rfl:   •  gabapentin (NEURONTIN) 600 MG tablet, TAKE 1 TABLET(600 MG) BY MOUTH TWICE DAILY, Disp: 180 tablet, Rfl: 3  •  hydrochlorothiazide (HYDRODIURIL) 25 mg tablet, TAKE 1 TABLET(25 MG) BY MOUTH DAILY, Disp: 90 tablet, Rfl: 3  •  hydroxychloroquine (PLAQUENIL) 200 mg tablet, Take 1 tablet (200 mg total) by mouth 2 (two) times a day with meals, Disp: 180 tablet, Rfl: 1  •  latanoprost (XALATAN) 0 005 % ophthalmic solution, INSTILL 1 DROP IN BOTH EYES EVERY DAY AT BEDTIME, Disp: , Rfl:   •  levocetirizine (XYZAL) 5 MG tablet, Take 5 mg by mouth daily, Disp: , Rfl:   •  olmesartan (BENICAR) 40 mg tablet, Take 1 tablet (40 mg total) by mouth daily, Disp: 90 tablet, Rfl: 3  •  Omega-3 Fatty Acids (FISH OIL PO), Take by mouth, Disp: , Rfl:   •  tiZANidine (ZANAFLEX) 4 mg tablet, Take 1 tablet (4 mg total) by mouth 2 (two) times a day, Disp: 20 tablet, Rfl: 0  •  Turmeric (QC TUMERIC COMPLEX PO), Take by mouth, Disp: , Rfl:     Health Maintenance     Health Maintenance   Topic Date Due   • Pneumococcal Vaccine: 65+ Years (1 - PCV) Never done   • COVID-19 Vaccine (3 - Booster for Pfizer series) 07/30/2021   • Colorectal Cancer Screening  08/05/2022   • Influenza Vaccine (1) Never done   • PT PLAN OF CARE  01/05/2023   • BMI: Followup Plan  03/07/2023   • Breast Cancer Screening: Mammogram  08/22/2023   • Depression Screening  11/29/2023   • Urinary Incontinence Screening  11/29/2023   • Medicare Annual Wellness Visit (AWV)  11/29/2023   • Fall Risk  12/06/2023   • BMI: Adult  01/16/2024   • DXA SCAN  10/27/2024   • Hepatitis C Screening  Completed   • Osteoporosis Screening  Completed   • HIB Vaccine  Aged Out   • IPV Vaccine  Aged Out   • Hepatitis A Vaccine  Aged Out   • Meningococcal ACWY Vaccine  Aged Out   • HPV Vaccine  Aged Out     Immunization History   Administered Date(s) Administered   • COVID-19 PFIZER VACCINE 0 3 ML IM 05/14/2021, 06/04/2021         Dee Grewal MD  [id number] [facility] BE[facility]AVIOR[facility] MEDICINE AT [facility]

## 2023-01-18 ENCOUNTER — APPOINTMENT (OUTPATIENT)
Dept: PHYSICAL THERAPY | Facility: CLINIC | Age: 68
End: 2023-01-18

## 2023-01-23 ENCOUNTER — APPOINTMENT (OUTPATIENT)
Dept: PHYSICAL THERAPY | Facility: CLINIC | Age: 68
End: 2023-01-23

## 2023-01-23 ENCOUNTER — TELEPHONE (OUTPATIENT)
Dept: INTERNAL MEDICINE CLINIC | Facility: CLINIC | Age: 68
End: 2023-01-23

## 2023-01-23 NOTE — TELEPHONE ENCOUNTER
Pt has gotten worse  Pt was here last Tuesday 1/17/2023  She would like to talk to [facility] to see what else she can take or do  Pain in all her joints, back, neck  Call pt [phone]

## 2023-01-25 ENCOUNTER — APPOINTMENT (OUTPATIENT)
Dept: LAB | Facility: CLINIC | Age: 68
End: 2023-01-25

## 2023-01-25 ENCOUNTER — APPOINTMENT (OUTPATIENT)
Dept: PHYSICAL THERAPY | Facility: CLINIC | Age: 68
End: 2023-01-25

## 2023-01-25 ENCOUNTER — HOSPITAL ENCOUNTER (EMERGENCY)
Facility: HOSPITAL | Age: 68
Discharge: HOME | End: 2023-01-25
Attending: EMERGENCY MEDICINE

## 2023-01-25 VITALS
DIASTOLIC BLOOD PRESSURE: 65 MMHG | SYSTOLIC BLOOD PRESSURE: 148 MMHG | OXYGEN SATURATION: 98 % | TEMPERATURE: 97.9 F | RESPIRATION RATE: 16 BRPM | HEART RATE: 71 BPM

## 2023-01-25 DIAGNOSIS — E83.52 HYPERCALCEMIA: ICD-10-CM

## 2023-01-25 DIAGNOSIS — M06.00 SERONEGATIVE RHEUMATOID ARTHRITIS (HCC): Primary | ICD-10-CM

## 2023-01-25 DIAGNOSIS — R80.9 PROTEINURIA, UNSPECIFIED TYPE: ICD-10-CM

## 2023-01-25 DIAGNOSIS — M13.0 POLYARTHRITIS: Primary | ICD-10-CM

## 2023-01-25 LAB
ALBUMIN SERPL BCP-MCNC: 3.1 G/DL (ref 3.5–5)
ALP SERPL-CCNC: 50 U/L (ref 46–116)
ALT SERPL W P-5'-P-CCNC: 17 U/L (ref 12–78)
ANION GAP SERPL CALCULATED.3IONS-SCNC: 6 MMOL/L (ref 4–13)
AST SERPL W P-5'-P-CCNC: 14 U/L (ref 5–45)
BACTERIA UR QL AUTO: ABNORMAL /HPF
BASOPHILS # BLD AUTO: 0.03 THOUSANDS/ÂΜL (ref 0–0.1)
BASOPHILS NFR BLD AUTO: 0 % (ref 0–1)
BILIRUB SERPL-MCNC: 0.41 MG/DL (ref 0.2–1)
BILIRUB UR QL STRIP: NEGATIVE
BUN SERPL-MCNC: 26 MG/DL (ref 5–25)
C3 SERPL-MCNC: 140 MG/DL (ref 90–180)
C4 SERPL-MCNC: 43 MG/DL (ref 10–40)
CALCIUM ALBUM COR SERPL-MCNC: 10.3 MG/DL (ref 8.3–10.1)
CALCIUM SERPL-MCNC: 9.6 MG/DL (ref 8.3–10.1)
CAOX CRY URNS QL MICRO: ABNORMAL /HPF
CHLORIDE SERPL-SCNC: 106 MMOL/L (ref 96–108)
CLARITY UR: ABNORMAL
CO2 SERPL-SCNC: 29 MMOL/L (ref 21–32)
COLOR UR: YELLOW
CREAT SERPL-MCNC: 0.92 MG/DL (ref 0.6–1.3)
CRP SERPL QL: 46.6 MG/L
EOSINOPHIL # BLD AUTO: 0.14 THOUSAND/ÂΜL (ref 0–0.61)
EOSINOPHIL NFR BLD AUTO: 2 % (ref 0–6)
ERYTHROCYTE [DISTWIDTH] IN BLOOD BY AUTOMATED COUNT: 12.4 % (ref 11.6–15.1)
ERYTHROCYTE [SEDIMENTATION RATE] IN BLOOD: 62 MM/HOUR (ref 0–29)
GFR SERPL CREATININE-BSD FRML MDRD: 65 ML/MIN/1.73SQ M
GLUCOSE P FAST SERPL-MCNC: 93 MG/DL (ref 65–99)
GLUCOSE UR STRIP-MCNC: NEGATIVE MG/DL
HCT VFR BLD AUTO: 34.8 % (ref 34.8–46.1)
HGB BLD-MCNC: 11.4 G/DL (ref 11.5–15.4)
HGB UR QL STRIP.AUTO: NEGATIVE
HYALINE CASTS #/AREA URNS LPF: ABNORMAL /LPF
IMM GRANULOCYTES # BLD AUTO: 0.02 THOUSAND/UL (ref 0–0.2)
IMM GRANULOCYTES NFR BLD AUTO: 0 % (ref 0–2)
KETONES UR STRIP-MCNC: NEGATIVE MG/DL
LEUKOCYTE ESTERASE UR QL STRIP: NEGATIVE
LYMPHOCYTES # BLD AUTO: 0.94 THOUSANDS/ÂΜL (ref 0.6–4.47)
LYMPHOCYTES NFR BLD AUTO: 13 % (ref 14–44)
MCH RBC QN AUTO: 29 PG (ref 26.8–34.3)
MCHC RBC AUTO-ENTMCNC: 32.8 G/DL (ref 31.4–37.4)
MCV RBC AUTO: 89 FL (ref 82–98)
MONOCYTES # BLD AUTO: 0.69 THOUSAND/ÂΜL (ref 0.17–1.22)
MONOCYTES NFR BLD AUTO: 9 % (ref 4–12)
MUCOUS THREADS UR QL AUTO: ABNORMAL
NEUTROPHILS # BLD AUTO: 5.53 THOUSANDS/ÂΜL (ref 1.85–7.62)
NEUTS SEG NFR BLD AUTO: 76 % (ref 43–75)
NITRITE UR QL STRIP: NEGATIVE
NON-SQ EPI CELLS URNS QL MICRO: ABNORMAL /HPF
NRBC BLD AUTO-RTO: 0 /100 WBCS
PH UR STRIP.AUTO: 5.5 [PH]
PLATELET # BLD AUTO: 387 THOUSANDS/UL (ref 149–390)
PMV BLD AUTO: 11.1 FL (ref 8.9–12.7)
POTASSIUM SERPL-SCNC: 3.9 MMOL/L (ref 3.5–5.3)
PROT SERPL-MCNC: 7.7 G/DL (ref 6.4–8.4)
PROT UR STRIP-MCNC: ABNORMAL MG/DL
RBC # BLD AUTO: 3.93 MILLION/UL (ref 3.81–5.12)
RBC #/AREA URNS AUTO: ABNORMAL /HPF
SODIUM SERPL-SCNC: 141 MMOL/L (ref 135–147)
SP GR UR STRIP.AUTO: 1.02 (ref 1–1.03)
UROBILINOGEN UR STRIP-ACNC: <2 MG/DL
WBC # BLD AUTO: 7.35 THOUSAND/UL (ref 4.31–10.16)
WBC #/AREA URNS AUTO: ABNORMAL /HPF

## 2023-01-25 RX ORDER — PREDNISONE 20 MG/1
60 TABLET ORAL ONCE
Status: COMPLETED | OUTPATIENT
Start: 2023-01-25 | End: 2023-01-25

## 2023-01-25 RX ORDER — MELOXICAM 15 MG/1
15 TABLET ORAL DAILY
Qty: 60 TABLET | Refills: 0 | Status: SHIPPED | OUTPATIENT
Start: 2023-01-25

## 2023-01-25 RX ORDER — PREDNISONE 10 MG/1
TABLET ORAL
Qty: 40 TABLET | Refills: 0 | Status: SHIPPED | OUTPATIENT
Start: 2023-01-25

## 2023-01-25 RX ADMIN — PREDNISONE 60 MG: 20 TABLET ORAL at 13:07
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
## 2023-01-26 ENCOUNTER — TELEPHONE (OUTPATIENT)
Dept: RHEUMATOLOGY | Facility: CLINIC | Age: 68
End: 2023-01-26

## 2023-01-26 LAB — DSDNA AB SER-ACNC: 34 IU/ML (ref 0–9)

## 2023-01-26 NOTE — TELEPHONE ENCOUNTER
Formerly [facility] @ Psychiatric  INPATIENT PROGRESS NOTE    PATIENT NAME: Jonathan Williamson      PHYSICIAN: Josue Howe MD  : 1955        MRN: [id number]  Patient Care Team:  Provider, No Known as PCP - General    Chief Complaint: Patient was admitted after arrest and return of spontaneous circulation at [facility].  History of Present Illness: 67-year-old gentleman with history of COPD medic pneumothorax who had been admitted to hospital hospital after patient was found to have chest pain and had cardiac arrest patient had received cardiopulmonary resuscitation as per ACLS protocol with epinephrine which resulted in return of spontaneous circulation.  Patient was intubated and was on sedation.  Patient's ABG was reported 7.08/109 32.  Patient was also reported to be hypotensive and norepinephrine drip along with vasopressin drip.  Patient was covered with broad-spectrum IV antibiotics.  Patient was admitted to [facility] for further treatment.  Patient's condition gradually improved.  Patient was managed to be extubated however patient remains extremely weak and was not able to tolerate oral diet.  Patient was continued on antibiotics.  Due to patient's extreme critical illness along with weakness and deconditioning with requirement of Dobbhoff feedings patient was recommended admitted to our facility the discharge summary dictated on  had been reviewed as documented above.  At the time of evaluation patient was resting comfortably.  Patient denies any complaints.  No headache or blurry vision reported.  No chest pain palpitation reported.  Patient states he is having some chest tightness however he attributes to the CPR.  Patient also complains of feeling weak.  No nausea vomiting reported.  Patient's echocardiogram done on  suggestive of diastolic congestive heart failure.  Patient's EKG done on December suggestive of normal  ----- Message from Sammi Arredondo PA-C sent at 1/25/2023  8:45 PM EST -----  Inflammatory markers elevated from baseline, corrected calcium elevated, and UA shows protein and some abnormalities  Per chart review, patient had been off HCQ for at least one month, just refilled by PCP last week  She was seen in the ED today for arthralgias and started on steroid course & NSAIDS  Pls call patient to let her know I would like her to get repeat labs done in one month, prior to our appt in March  Orders placed in chart  sinus rhythm at 78 bpm with no acute ST changes noted.  No chamber enlargement has been identified.  Patient CT head was negative for any acute findings as well.  That was done on December 27, 2022.  Laboratory results prior to coming to her facility where from January 4, 2023 white count 12.9 hemoglobin 11.1 hematocrit 34.3 platelet count of 338.  Sodium 134 potassium 4.9 chloride 94 bicarb 39 BUN of 18 creatinine 0.4 calcium 8.4.  Patient will be admitted to our facility for further therapy especially try to get patient off of oxygen supplementation along with aggressive therapy and try to advance diet as tolerated.       Assessment       Acute on chronic hypoxic respiratory failure.  S/p cardiac arrest.  Septic shock, resolved.  Critical illness myopathy.  Grade diastolic dysfunction with congestive heart failure.  Hypothermia.  Coronary artery disease.  Chronic obstructive pulmonary disease exacerbation.  Hypertension  Dyslipidemia.  Depression.  DVT and GI prophylaxis.  Cachexia with BMI less than and severe protein calorie malnutrition.     DVT Prophylaxis: SCDs and JAYJAY stockings.  GI Prophylaxis: We will start Protonix.  Code Status: Full code.     Plan      -Rocephin as ordered.  -Continue with IV steroids and nebulizer treatment as ordered.  -Continue oxygen supplementation wean as tolerated.  -Bi-PAP therapy nightly.    -Leukocytosis at 24.22.  No fever or chills  -Remains inconsistent with Bi-PAP.  Gives verbal understanding of risks.  -Continue to strongly encourage compliance.  -Patient family understands patient prognosis and diagnosis.  -Continue therapy and strongly recommend out of bed daily as tolerated.  -Continue to monitor labs closely.  -Continue tube feeding as patient tolerates.  -DVT and GI prophylaxis in place.  -We'll continue monitoring patient in hospital setting and treat patient as course dictates.  -Please review orders for detailed plan of care.  -For Aute and Chronic medical  condition we will continue medications described below in medication section which have been reviewed and we will continue unless changed in plan of care.  -Laboratory and diagnostic studies have been independently reviewed and the reports are reviewed as documented below.     Subjective   Interval History:   Patient Complaints:   Patient seen and examined. Resting in bed on bi-pap. No overnight events noted. No needs at this time.  History taken from: Chart, nursing staff.    Review of Systems:    Review of Systems   Constitutional: Positive for activity change and appetite change. Negative for chills, diaphoresis and fever.   HENT: Negative for congestion, rhinorrhea, sore throat and trouble swallowing.    Eyes: Negative for visual disturbance.   Respiratory: Negative for cough, chest tightness, shortness of breath and wheezing.    Cardiovascular: Negative for chest pain, palpitations and leg swelling.   Gastrointestinal: Negative for abdominal pain, blood in stool, diarrhea, nausea and vomiting.   Endocrine: Negative for cold intolerance and heat intolerance.   Genitourinary: Negative for decreased urine volume and difficulty urinating.   Musculoskeletal: Negative for back pain, gait problem and neck pain.   Skin: Negative for rash.   Neurological: Positive for weakness. Negative for dizziness, syncope, light-headedness, numbness and headaches.   Psychiatric/Behavioral: The patient is not nervous/anxious.        Objective   Vital Signs  Temp: 97.2F         Blood Pressure: 93/62 Heart Rate: 83        Resp: 24         O2: 98 %    Physical Exam:   Physical Exam  Vitals and nursing note reviewed.   Constitutional:       Appearance: He is well-developed.   HENT:      Head: Normocephalic and atraumatic.      Nose: Nose normal.   Eyes:      Extraocular Movements: Extraocular movements intact.      Conjunctiva/sclera: Conjunctivae normal.      Pupils: Pupils are equal, round, and reactive to light.   Neck:      Thyroid:  No thyromegaly.      Vascular: No JVD.      Trachea: No tracheal deviation.   Cardiovascular:      Rate and Rhythm: Regular rhythm. Tachycardia present.      Heart sounds: Normal heart sounds.   Pulmonary:      Effort: Pulmonary effort is normal. No respiratory distress.      Breath sounds: Wheezing and rhonchi present. No rales.   Chest:      Chest wall: No tenderness.   Abdominal:      General: Bowel sounds are normal. There is no distension.      Palpations: Abdomen is soft.      Tenderness: There is no abdominal tenderness. There is no guarding or rebound.   Musculoskeletal:         General: Normal range of motion.      Cervical back: Normal range of motion and neck supple.   Lymphadenopathy:      Cervical: No cervical adenopathy.   Skin:     General: Skin is warm and dry.      Capillary Refill: Capillary refill takes 2 to 3 seconds.      Comments: Intact   Neurological:      Mental Status: He is alert and oriented to person, place, and time.      Cranial Nerves: No cranial nerve deficit.      Motor: Weakness present.      Gait: Gait abnormal.      Deep Tendon Reflexes: Reflexes are normal and symmetric.   Psychiatric:         Mood and Affect: Mood normal.         Behavior: Behavior normal.       Results Review:       Results from last 7 days   Lab Units 01/09/23  0608 01/06/23  0700 01/05/23  0706   SODIUM mmol/L 144  --  133*   POTASSIUM mmol/L 3.9 4.5 5.3*   CHLORIDE mmol/L 93*  --  91*   CO2 mmol/L 45.0*  --  34.0*   BUN mg/dL 62*  --  22   CREATININE mg/dL 0.58*  --  0.47*   GLUCOSE mg/dL 85  --  154*   CALCIUM mg/dL 8.8  --  8.6   BILIRUBIN mg/dL 0.6  --  0.5   ALK PHOS U/L 88  --  102   ALT (SGPT) U/L 56*  --  132*   AST (SGOT) U/L 21  --  37     Results from last 7 days   Lab Units 01/09/23  0608 01/05/23  0706 01/04/23  1931   MAGNESIUM mg/dL 2.5* 2.0 1.8*     Results from last 7 days   Lab Units 01/09/23  0608 01/05/23  0706   WBC 10*3/mm3 24.22* 13.98*   HEMOGLOBIN g/dL 12.1* 12.2*   HEMATOCRIT % 38.3  38.6   PLATELETS 10*3/mm3 385 443     Lab Results   Component Value Date    TROPONINI 0.14 (H) 12/28/2022     pH, Arterial   Date Value Ref Range Status   01/09/2023 7.478 (H) 7.350 - 7.450 pH units Final     Comment:     83 Value above reference range     CO2   Date Value Ref Range Status   01/09/2023 45.0 (H) 22.0 - 29.0 mmol/L Final         Imaging Results (Most Recent)     Procedure Component Value Units Date/Time    XR Chest 1 View [[id number]] Collected: 01/09/23 1044     Updated: 01/09/23 1729    Narrative:          COMPARISON:     1/5/2023    INDICATION:     Shortness of air    FINDINGS:     Portable AP chest radiograph is obtained.  There is  a right upper extremity PICC line, with its tip last seen at the  level of the distal superior vena cava. There is a nasoenteric  catheter which extends below the inferior aspect of the image.   The cardiomediastinal silhouette appears normal size and  configuration.  The pulmonary vasculature is within normal  limits.  Lungs are hyperlucent. There is unchanged right upper  lobe parenchymal opacity with distortion. There are increased  right mid lung and basilar airspace opacities. Minimal blunting  right costophrenic angle. Minimal blunting left costophrenic  angle. No pneumothorax.      Impression:      1. Interval placement of a right upper extremity PICC line. Its  tip is not well visualized as it is obscured by overlying tubes,  but appears to be at the distal superior vena cava.  2. Worsening right mid lung and basilar airspace opacities.  Possible small left effusion.      3. Right upper lobe parenchymal consolidation with distortion.    Electronically signed by:  Ludwig Aguilera MD  1/9/2023 5:26 PM CST  Workstation: [id number]    XR Chest 1 View [[id number]] Collected: 01/05/23 1447     Updated: 01/05/23 4091    Narrative:      EXAM: XR CHEST 1 VIEW    HISTORY: Increased SOA, Z74.09 Other reduced mobility Z78.9 Other  specified health status    COMPARISON:    TECHNIQUE:    Portable view of the chest.    FINDINGS:   Bullous change in the right upper lung with coexisting streaky  change which may represent parenchymal scarring. There is feeding  tube in place with distal portion below the diaphragm..  Bilateral hyperinflation of the lung parenchyma due to COPD.  Probable chronic pleural parenchymal change in the left lateral  costophrenic angle.  The left lung remains well aerated. The heart size is within  normal limits. The mediastinal contours are within normal limits.  .  No evidence of mediastinal shift or pneumothorax.  Unremarkable visualized osseous structures.      Impression:      1.  COPD.  2.  Bullous change in the right upper lung with coexisting  streaky change which may represent parenchymal scarring.  3.  There is feeding tube in place with distal portion below the  diaphragm..          Electronically signed by:  Carl Mosley MD  1/5/2023 3:48 PM CST  Workstation: [id number]    XR Abdomen KUB [[id number]] Collected: 01/04/23 2322     Updated: 01/04/23 2351    Narrative:      PROCEDURE:    XR ABDOMEN 1 VIEW (KUB)  dated  1/4/2023 11:22 PM  CST    CLINICAL HISTORY:   Male 67 years of age.   New Admission Verify  NG Tube Feeding Placement    TECHNIQUE: Frontal view supine abdomen.    PREVIOUS STUDIES:  None Available    FINDINGS:      Feeding tube within the descending duodenum.  No intraperitoneal free air. Intraluminal gas throughout  nondilated small intestine and colon.  Limited assessment of the origin soft tissue planes.      Impression:      Feeding tube within the descending duodenum.    Electronically signed by:  Nasreen Camacho MD  1/4/2023 11:49 PM  CST Workstation: [id number]         No results found for: ACANTHNAEG, AFBCX, BPERTUSSISCX, BLOODCX  No results found for: BCIDPCR, CXREFLEX, CSFCX, CULTURETIS  No results found for: CULTURES, HSVCX, URCX  No results found for: EYECULTURE, GCCX, HSVCULTURE, LABHSV  No results found for: LEGIONELLA, MRSACX, MUMPSCX,  MYCOPLASCX  No results found for: NOCARDIACX, STOOLCX  No results found for: THROATCX, UNSTIMCULT, URINECX, CULTURE, VZVCULTUR  No results found for: VIRALCULTU, WOUNDCX  Medication Reviewed and Will Continue Unless Documented in Plan:   atorvastatin, 40 mg, Oral, Nightly  budesonide, 0.5 mg, Nebulization, BID - RT  carvedilol, 6.25 mg, Oral, BID With Meals  cefTRIAXone, 1 g, Intravenous, Q24H  clopidogrel, 75 mg, Oral, Daily  escitalopram, 30 mg, Oral, Daily  furosemide, 60 mg, Intravenous, Once  heparin (porcine), 5,000 Units, Subcutaneous, Q8H  Insulin Aspart, 0-12 Units, Subcutaneous, 4x Daily AC & at Bedtime  isosorbide dinitrate, 15 mg, Oral, TID - Nitrates  methylPREDNISolone sodium succinate, 40 mg, Intravenous, Q12H  metroNIDAZOLE, 500 mg, Intravenous, Q8H  potassium chloride, 40 mEq, Per PEG Tube, Once  sodium chloride, 3 mL, Intracatheter, Q12H  sodium zirconium cyclosilicate, 10 g, Oral, Daily  traZODone, 25 mg, Per PEG Tube, Nightly         •  acetaminophen **OR** acetaminophen **OR** acetaminophen  •  albuterol  •  ALPRAZolam  •  dextrose  •  guaifenesin  •  ipratropium-albuterol  •  ondansetron **OR** ondansetron  •  sodium chloride  •  sodium chloride      Dietary Orders (From admission, onward)     Start     Ordered    01/10/23 1559  Diet, Tube Feeding Diabetisource AC (Glucerna 1.2); Tube Feeding Type: Continuous; Continuous Tube Feeding Start Rate (mL/hr): 30; Then Advance Rate By (mL/hr): 10; Every __ Hours: 8; To Goal Rate of (mL/hr): 60  Diet Effective Now        Question Answer Comment   Tube Feeding Formula: Diabetisource AC (Glucerna 1.2)    Tube Feeding Type Continuous    Continuous Tube Feeding Start Rate (mL/hr) 30    Then Advance Rate By (mL/hr) 10    Every __ Hours 8    To Goal Rate of (mL/hr) 60    Water Flush Amount (mL) 25    Water Flush Frequency Every 2 Hours        01/10/23 1600    01/06/23 1204  Diet: Renal Diets; Low Potassium; Texture: Pureed (NDD 1); Fluid Consistency: Thin  (IDDSI 0)  Diet Effective Now        References:    Diet Order [place]   Question Answer Comment   Diets: Renal Diets    Renal Diet: Low Potassium    Texture: Pureed (NDD 1)    Fluid Consistency: Thin (IDDSI 0)        01/06/23 1204    01/05/23 1800  Dietary Nutrition Supplements Novasource Renal (Nepro)  Daily With Breakfast, Lunch & Dinner      Question:  Select Supplement:  Answer:  Novasource Renal (Nepro)    01/05/23 1246              History, physical exam, assessment and plan may have been partly or fully copied from before, but  changes made to the copied record to reflect care on the date of service. Part of the lab and imaging  reports auto populated and corrected. Some of this note may be an electronic transcription of spoken  language to printed text. This may permit erroneous, or at times, nonsensical words or phrases to be  inadvertently transcribed. Although I have reviewed the note for such errors, some may still exist.      This document has been electronically signed by Josue Howe MD on January 11, 2023 08:58 CST

## 2023-01-30 ENCOUNTER — APPOINTMENT (OUTPATIENT)
Dept: PHYSICAL THERAPY | Facility: CLINIC | Age: 68
End: 2023-01-30

## 2023-02-14 RX ORDER — SODIUM CHLORIDE, SODIUM LACTATE, POTASSIUM CHLORIDE, CALCIUM CHLORIDE 600; 310; 30; 20 MG/100ML; MG/100ML; MG/100ML; MG/100ML
125 INJECTION, SOLUTION INTRAVENOUS CONTINUOUS
Status: CANCELLED | OUTPATIENT
Start: 2023-02-14

## 2023-02-15 ENCOUNTER — ANESTHESIA EVENT (OUTPATIENT)
Dept: GASTROENTEROLOGY | Facility: HOSPITAL | Age: 68
End: 2023-02-15

## 2023-02-15 ENCOUNTER — ANESTHESIA (OUTPATIENT)
Dept: GASTROENTEROLOGY | Facility: HOSPITAL | Age: 68
End: 2023-02-15

## 2023-02-15 ENCOUNTER — HOSPITAL ENCOUNTER (OUTPATIENT)
Dept: GASTROENTEROLOGY | Facility: HOSPITAL | Age: 68
Setting detail: OUTPATIENT SURGERY
Discharge: HOME | End: 2023-02-15
Attending: INTERNAL MEDICINE | Admitting: INTERNAL MEDICINE

## 2023-02-15 VITALS
RESPIRATION RATE: 13 BRPM | DIASTOLIC BLOOD PRESSURE: 57 MMHG | HEIGHT: 66 IN | TEMPERATURE: 98.2 F | WEIGHT: 177.91 LBS | BODY MASS INDEX: 28.59 KG/M2 | OXYGEN SATURATION: 96 % | SYSTOLIC BLOOD PRESSURE: 113 MMHG | HEART RATE: 70 BPM

## 2023-02-15 DIAGNOSIS — Z86.010 HISTORY OF COLON POLYPS: ICD-10-CM

## 2023-02-15 DIAGNOSIS — Z12.11 SCREENING FOR COLON CANCER: ICD-10-CM

## 2023-02-15 RX ORDER — SODIUM CHLORIDE, SODIUM LACTATE, POTASSIUM CHLORIDE, CALCIUM CHLORIDE 600; 310; 30; 20 MG/100ML; MG/100ML; MG/100ML; MG/100ML
INJECTION, SOLUTION INTRAVENOUS CONTINUOUS PRN
Status: DISCONTINUED | OUTPATIENT
Start: 2023-02-15 | End: 2023-02-15

## 2023-02-15 RX ORDER — SODIUM CHLORIDE, SODIUM LACTATE, POTASSIUM CHLORIDE, CALCIUM CHLORIDE 600; 310; 30; 20 MG/100ML; MG/100ML; MG/100ML; MG/100ML
125 INJECTION, SOLUTION INTRAVENOUS CONTINUOUS
Status: DISCONTINUED | OUTPATIENT
Start: 2023-02-15 | End: 2023-02-19 | Stop reason: HOSPADM

## 2023-02-15 RX ORDER — PROPOFOL 10 MG/ML
INJECTION, EMULSION INTRAVENOUS AS NEEDED
Status: DISCONTINUED | OUTPATIENT
Start: 2023-02-15 | End: 2023-02-15

## 2023-02-15 RX ORDER — LIDOCAINE HYDROCHLORIDE 20 MG/ML
INJECTION, SOLUTION EPIDURAL; INFILTRATION; INTRACAUDAL; PERINEURAL AS NEEDED
Status: DISCONTINUED | OUTPATIENT
Start: 2023-02-15 | End: 2023-02-15

## 2023-02-15 RX ADMIN — SODIUM CHLORIDE, SODIUM LACTATE, POTASSIUM CHLORIDE, AND CALCIUM CHLORIDE: .6; .31; .03; .02 INJECTION, SOLUTION INTRAVENOUS at 09:42

## 2023-02-15 RX ADMIN — SODIUM CHLORIDE, SODIUM LACTATE, POTASSIUM CHLORIDE, AND CALCIUM CHLORIDE 125 ML/HR: .6; .31; .03; .02 INJECTION, SOLUTION INTRAVENOUS at 09:21

## 2023-02-15 RX ADMIN — LIDOCAINE HYDROCHLORIDE 100 MG: 20 INJECTION, SOLUTION EPIDURAL; INFILTRATION; INTRACAUDAL at 10:19

## 2023-02-15 RX ADMIN — PROPOFOL 80 MG: 10 INJECTION, EMULSION INTRAVENOUS at 10:24

## 2023-02-15 RX ADMIN — PROPOFOL 120 MG: 10 INJECTION, EMULSION INTRAVENOUS at 10:19
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
## 2023-02-15 NOTE — ANESTHESIA POSTPROCEDURE EVALUATION
Post-Op Assessment Note    CV Status:  Stable    Pain management: adequate     Mental Status:  Alert and awake   Hydration Status:  Euvolemic   PONV Controlled:  Controlled   Airway Patency:  Patent      Post Op Vitals Reviewed: Yes      Staff: Anesthesiologist, CRNA         There were no known notable events for this encounter      /57 (02/15/23 1032)    Temp 98 2 °F (36 8 °C) (02/15/23 1032)    Pulse 61 (02/15/23 1032)   Resp 16 (02/15/23 1032)    SpO2 96 % (02/15/23 1032)

## 2023-02-15 NOTE — INTERVAL H&P NOTE
H&P reviewed  After examining the patient I find no changes in the patients condition since the H&P had been written      Vitals:    02/15/23 0913   BP: 144/69   Pulse: 68   Resp: 20   Temp: (!) 97 °F (36 1 °C)   SpO2: 98%

## 2023-02-15 NOTE — ANESTHESIA PREPROCEDURE EVALUATION
Procedure:  COLONOSCOPY    Relevant Problems   ANESTHESIA (within normal limits)   (-) History of anesthesia complications      CARDIO   (+) Essential hypertension   (+) Mixed hyperlipidemia      ENDO   (+) Hyperparathyroidism (HCC)      GI/HEPATIC  Confirmed NPO appropriate  s/p bowel prep      /RENAL (within normal limits)      HEMATOLOGY (within normal limits)      MUSCULOSKELETAL   (+) Arthritis   (+) Bilateral post-traumatic osteoarthritis of knee   (+) Chronic right-sided low back pain with right-sided sciatica   (+) Rheumatoid arthritis of multiple sites with negative rheumatoid factor (HCC) (on plaquenil, steroid taper in December for flare)      NEURO/PSYCH   (+) Chronic pain of both shoulders   (+) Chronic right-sided low back pain with right-sided sciatica      PULMONARY (within normal limits)   (-) Smoking   (-) URI (upper respiratory infection)        Physical Exam    Airway    Mallampati score: II  TM Distance: >3 FB  Neck ROM: full     Dental   No notable dental hx     Cardiovascular  Rhythm: regular, Rate: normal,     Pulmonary  Breath sounds clear to auscultation,     Other Findings        Anesthesia Plan  ASA Score- 3     Anesthesia Type- IV sedation with anesthesia with ASA Monitors  Additional Monitors:   Airway Plan:     Comment: I discussed the risks and benefits of IV sedation anesthesia including the possibility of the need to convert to general anesthesia and the potential risk of awareness  The patient was given the opportunity to ask questions, which were answered          Plan Factors-Exercise tolerance (METS): >4 METS  Exercise comment: Walks regularly, uses cane  Chart reviewed  Patient is not a current smoker  Induction- intravenous  Postoperative Plan-     Informed Consent- Anesthetic plan and risks discussed with patient  I personally reviewed this patient with the CRNA  Discussed and agreed on the Anesthesia Plan with the CRNA  Rhea Ray

## 2023-02-17 ENCOUNTER — APPOINTMENT (OUTPATIENT)
Dept: LAB | Facility: CLINIC | Age: 68
End: 2023-02-17

## 2023-02-17 ENCOUNTER — TELEPHONE (OUTPATIENT)
Dept: OBGYN CLINIC | Facility: HOSPITAL | Age: 68
End: 2023-02-17

## 2023-02-17 DIAGNOSIS — R80.9 PROTEINURIA, UNSPECIFIED TYPE: ICD-10-CM

## 2023-02-17 DIAGNOSIS — M06.00 SERONEGATIVE RHEUMATOID ARTHRITIS (HCC): ICD-10-CM

## 2023-02-17 DIAGNOSIS — E83.52 HYPERCALCEMIA: ICD-10-CM

## 2023-02-17 LAB
ALBUMIN SERPL BCP-MCNC: 3.3 G/DL (ref 3.5–5)
ALP SERPL-CCNC: 46 U/L (ref 46–116)
ALT SERPL W P-5'-P-CCNC: 21 U/L (ref 12–78)
ANION GAP SERPL CALCULATED.3IONS-SCNC: 3 MMOL/L (ref 4–13)
AST SERPL W P-5'-P-CCNC: 14 U/L (ref 5–45)
BACTERIA UR QL AUTO: ABNORMAL /HPF
BILIRUB SERPL-MCNC: 0.44 MG/DL (ref 0.2–1)
BILIRUB UR QL STRIP: NEGATIVE
BUN SERPL-MCNC: 22 MG/DL (ref 5–25)
CALCIUM ALBUM COR SERPL-MCNC: 10.1 MG/DL (ref 8.3–10.1)
CALCIUM SERPL-MCNC: 9.5 MG/DL (ref 8.3–10.1)
CHLORIDE SERPL-SCNC: 110 MMOL/L (ref 96–108)
CLARITY UR: ABNORMAL
CO2 SERPL-SCNC: 26 MMOL/L (ref 21–32)
COLOR UR: YELLOW
CREAT SERPL-MCNC: 0.8 MG/DL (ref 0.6–1.3)
CREAT UR-MCNC: 245 MG/DL
CRP SERPL QL: 15.7 MG/L
ERYTHROCYTE [SEDIMENTATION RATE] IN BLOOD: 49 MM/HOUR (ref 0–29)
GFR SERPL CREATININE-BSD FRML MDRD: 77 ML/MIN/1.73SQ M
GLUCOSE P FAST SERPL-MCNC: 92 MG/DL (ref 65–99)
GLUCOSE UR STRIP-MCNC: NEGATIVE MG/DL
HGB UR QL STRIP.AUTO: NEGATIVE
KETONES UR STRIP-MCNC: NEGATIVE MG/DL
LEUKOCYTE ESTERASE UR QL STRIP: NEGATIVE
MUCOUS THREADS UR QL AUTO: ABNORMAL
NITRITE UR QL STRIP: NEGATIVE
NON-SQ EPI CELLS URNS QL MICRO: ABNORMAL /HPF
PH UR STRIP.AUTO: 6.5 [PH]
PHOSPHATE SERPL-MCNC: 3.2 MG/DL (ref 2.3–4.1)
POTASSIUM SERPL-SCNC: 4.2 MMOL/L (ref 3.5–5.3)
PROT SERPL-MCNC: 7.3 G/DL (ref 6.4–8.4)
PROT UR STRIP-MCNC: ABNORMAL MG/DL
PROT UR-MCNC: 33 MG/DL
PROT/CREAT UR: 0.13 MG/G{CREAT} (ref 0–0.1)
PTH-INTACT SERPL-MCNC: 40.4 PG/ML (ref 18.4–80.1)
RBC #/AREA URNS AUTO: ABNORMAL /HPF
SODIUM SERPL-SCNC: 139 MMOL/L (ref 135–147)
SP GR UR STRIP.AUTO: 1.02 (ref 1–1.03)
UROBILINOGEN UR STRIP-ACNC: <2 MG/DL
WBC #/AREA URNS AUTO: ABNORMAL /HPF

## 2023-02-17 NOTE — TELEPHONE ENCOUNTER
Caller: Rita Monteiro    Doctor: Yandy Ceron PA-C    Reason for call: Patient calling to report that she finished her steroid treatment 1 week ago, states she is not feeling well since finishing  Patient had lab work done this morning  Asking if she should be seen sooner than her scheduled 3/8 appt?   Please advise    Call back#: [phone]
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
## 2023-02-21 ENCOUNTER — APPOINTMENT (OUTPATIENT)
Dept: LAB | Facility: CLINIC | Age: 68
End: 2023-02-21

## 2023-02-21 ENCOUNTER — OFFICE VISIT (OUTPATIENT)
Dept: RHEUMATOLOGY | Facility: CLINIC | Age: 68
End: 2023-02-21

## 2023-02-21 VITALS
WEIGHT: 177 LBS | DIASTOLIC BLOOD PRESSURE: 78 MMHG | BODY MASS INDEX: 28.45 KG/M2 | HEIGHT: 66 IN | SYSTOLIC BLOOD PRESSURE: 138 MMHG

## 2023-02-21 DIAGNOSIS — Z11.59 NEED FOR HEPATITIS B SCREENING TEST: ICD-10-CM

## 2023-02-21 DIAGNOSIS — E83.52 HYPERCALCEMIA: ICD-10-CM

## 2023-02-21 DIAGNOSIS — M06.00 SERONEGATIVE RHEUMATOID ARTHRITIS (HCC): Primary | ICD-10-CM

## 2023-02-21 DIAGNOSIS — Z79.899 HIGH RISK MEDICATION USE: ICD-10-CM

## 2023-02-21 DIAGNOSIS — M25.50 POLYARTHRALGIA: ICD-10-CM

## 2023-02-21 DIAGNOSIS — M15.9 PRIMARY OSTEOARTHRITIS INVOLVING MULTIPLE JOINTS: ICD-10-CM

## 2023-02-21 DIAGNOSIS — M13.0 POLYARTHRITIS: ICD-10-CM

## 2023-02-21 LAB
25(OH)D3 SERPL-MCNC: 33.7 NG/ML (ref 30–100)
ALBUMIN SERPL BCP-MCNC: 4 G/DL (ref 3.5–5)
ALP SERPL-CCNC: 45 U/L (ref 34–104)
ALT SERPL W P-5'-P-CCNC: 12 U/L (ref 7–52)
ANION GAP SERPL CALCULATED.3IONS-SCNC: 10 MMOL/L (ref 4–13)
AST SERPL W P-5'-P-CCNC: 13 U/L (ref 13–39)
BASOPHILS # BLD AUTO: 0.03 THOUSANDS/ÂΜL (ref 0–0.1)
BASOPHILS NFR BLD AUTO: 1 % (ref 0–1)
BILIRUB SERPL-MCNC: 0.45 MG/DL (ref 0.2–1)
BUN SERPL-MCNC: 29 MG/DL (ref 5–25)
CA-I BLD-SCNC: 1.18 MMOL/L (ref 1.12–1.32)
CALCIUM SERPL-MCNC: 9.5 MG/DL (ref 8.4–10.2)
CHLORIDE SERPL-SCNC: 104 MMOL/L (ref 96–108)
CO2 SERPL-SCNC: 26 MMOL/L (ref 21–32)
CREAT SERPL-MCNC: 0.8 MG/DL (ref 0.6–1.3)
CRP SERPL QL: 21.2 MG/L
EOSINOPHIL # BLD AUTO: 0.21 THOUSAND/ÂΜL (ref 0–0.61)
EOSINOPHIL NFR BLD AUTO: 4 % (ref 0–6)
ERYTHROCYTE [DISTWIDTH] IN BLOOD BY AUTOMATED COUNT: 13.5 % (ref 11.6–15.1)
ERYTHROCYTE [SEDIMENTATION RATE] IN BLOOD: 48 MM/HOUR (ref 0–29)
GFR SERPL CREATININE-BSD FRML MDRD: 77 ML/MIN/1.73SQ M
GLUCOSE SERPL-MCNC: 86 MG/DL (ref 65–140)
HCT VFR BLD AUTO: 35.9 % (ref 34.8–46.1)
HGB BLD-MCNC: 11.7 G/DL (ref 11.5–15.4)
IMM GRANULOCYTES # BLD AUTO: 0.02 THOUSAND/UL (ref 0–0.2)
IMM GRANULOCYTES NFR BLD AUTO: 0 % (ref 0–2)
LYMPHOCYTES # BLD AUTO: 0.78 THOUSANDS/ÂΜL (ref 0.6–4.47)
LYMPHOCYTES NFR BLD AUTO: 15 % (ref 14–44)
MCH RBC QN AUTO: 29 PG (ref 26.8–34.3)
MCHC RBC AUTO-ENTMCNC: 32.6 G/DL (ref 31.4–37.4)
MCV RBC AUTO: 89 FL (ref 82–98)
MONOCYTES # BLD AUTO: 0.53 THOUSAND/ÂΜL (ref 0.17–1.22)
MONOCYTES NFR BLD AUTO: 10 % (ref 4–12)
NEUTROPHILS # BLD AUTO: 3.65 THOUSANDS/ÂΜL (ref 1.85–7.62)
NEUTS SEG NFR BLD AUTO: 70 % (ref 43–75)
NRBC BLD AUTO-RTO: 0 /100 WBCS
PHOSPHATE SERPL-MCNC: 3.4 MG/DL (ref 2.3–4.1)
PLATELET # BLD AUTO: 290 THOUSANDS/UL (ref 149–390)
PMV BLD AUTO: 9.8 FL (ref 8.9–12.7)
POTASSIUM SERPL-SCNC: 4.1 MMOL/L (ref 3.5–5.3)
PROT SERPL-MCNC: 7.2 G/DL (ref 6.4–8.4)
PTH-INTACT SERPL-MCNC: 36.2 PG/ML (ref 18.4–80.1)
RBC # BLD AUTO: 4.04 MILLION/UL (ref 3.81–5.12)
SODIUM SERPL-SCNC: 140 MMOL/L (ref 135–147)
WBC # BLD AUTO: 5.22 THOUSAND/UL (ref 4.31–10.16)

## 2023-02-21 RX ORDER — MELOXICAM 7.5 MG/1
7.5 TABLET ORAL DAILY
Qty: 90 TABLET | Refills: 1 | Status: SHIPPED | OUTPATIENT
Start: 2023-02-21 | End: 2023-05-22

## 2023-02-21 RX ORDER — TRIAMCINOLONE ACETONIDE 40 MG/ML
40 INJECTION, SUSPENSION INTRA-ARTICULAR; INTRAMUSCULAR ONCE
Status: COMPLETED | OUTPATIENT
Start: 2023-02-21 | End: 2023-02-21

## 2023-02-21 RX ORDER — SULFASALAZINE 500 MG/1
1000 TABLET ORAL 2 TIMES DAILY
Qty: 360 TABLET | Refills: 1 | Status: SHIPPED | OUTPATIENT
Start: 2023-02-21 | End: 2023-02-24 | Stop reason: SDUPTHER

## 2023-02-21 RX ADMIN — TRIAMCINOLONE ACETONIDE 40 MG: 40 INJECTION, SUSPENSION INTRA-ARTICULAR; INTRAMUSCULAR at 15:01

## 2023-02-21 NOTE — PATIENT INSTRUCTIONS
Get labs now - if ok, then start sulfasalazine 500mg - 1 tab once a day for 1 week, then 1 tablet twice a day for 1 week, then 2 tablets twice a day after that  This will take 2 to 3 months to take effect  You can take them meloxicam 7 5 mg once daily  Get labs at the end of March  Get labs at the end of May  Stop your calcium supplementation  I placed referral in the chart for hematology/oncology to figure out why your calcium levels are elevated  See me back in 3 months

## 2023-02-22 ENCOUNTER — TELEPHONE (OUTPATIENT)
Dept: RHEUMATOLOGY | Facility: CLINIC | Age: 68
End: 2023-02-22

## 2023-02-22 ENCOUNTER — APPOINTMENT (OUTPATIENT)
Dept: LAB | Facility: HOSPITAL | Age: 68
End: 2023-02-22

## 2023-02-22 DIAGNOSIS — R76.8 HEPATITIS B CORE ANTIBODY POSITIVE: ICD-10-CM

## 2023-02-22 DIAGNOSIS — Z11.59 NEED FOR HEPATITIS B SCREENING TEST: Primary | ICD-10-CM

## 2023-02-22 LAB
HBV CORE AB SER QL: REACTIVE
HBV CORE IGM SER QL: ABNORMAL
HBV SURFACE AG SER QL: ABNORMAL
HCV AB SER QL: ABNORMAL
KAPPA LC FREE SER-MCNC: 45.4 MG/L (ref 3.3–19.4)
KAPPA LC FREE/LAMBDA FREE SER: 1.62 {RATIO} (ref 0.26–1.65)
LAMBDA LC FREE SERPL-MCNC: 28 MG/L (ref 5.7–26.3)

## 2023-02-22 PROCEDURE — 87517 HEPATITIS B DNA QUANT: CPT | Performed by: PHYSICIAN ASSISTANT

## 2023-02-22 PROCEDURE — 36415 COLL VENOUS BLD VENIPUNCTURE: CPT | Performed by: PHYSICIAN ASSISTANT

## 2023-02-22 NOTE — TELEPHONE ENCOUNTER
----- Message from Cory Fishman PA-C sent at 2/22/2023 12:14 PM EST -----  Please call the patient and let her know that one of the hepatitis B tests came back positive  It could be a false positive, but we need to rule out if she had a recent hepatitis infection  I placed orders in the chart for hepatitis B virus quantitative DNA PCR for her to have collected  Please apologize on my behalf that she has to go back to the lab  Unfortunately they were not able to add on this lab test from yesterday  She should continue to hold off on starting the new medication (sulfasalazine) until we get the results of this test   Thanks!

## 2023-02-23 LAB
ALBUMIN UR ELPH-MCNC: 100 %
ALPHA1 GLOB MFR UR ELPH: 0 %
ALPHA2 GLOB MFR UR ELPH: 0 %
B-GLOBULIN MFR UR ELPH: 0 %
GAMMA GLOB MFR UR ELPH: 0 %
PROT PATTERN UR ELPH-IMP: NORMAL
PROT UR-MCNC: 15 MG/DL

## 2023-02-23 NOTE — TELEPHONE ENCOUNTER
The lab from yesterday is still pending, we will reach out to her with the result and when she can start the new medication

## 2023-02-23 NOTE — TELEPHONE ENCOUNTER
Called pt and explained the information that I explained yesterday to her   Pt states that 10 minutes after my phone call to the , she went to get the labs done  She is wondering if you have received those results yet and whether or not she should start the sulfasalazine once you have read over the results

## 2023-02-23 NOTE — TELEPHONE ENCOUNTER
Caller: Patient    Doctor: Elisa Solitario    Reason for call: Patient had blood work done yesterday  Questioned results and if she should p/u medication?     Call back#: [phone]

## 2023-02-23 NOTE — TELEPHONE ENCOUNTER
Caller: Patient    Doctor: Dwain Liu    Reason for call: Patient called back for an update on her lab work and the new medication she was going to get  Verbalized to patient that the results were still pending and that we will call her when we receive the results      Call back#: [phone]

## 2023-02-24 ENCOUNTER — TELEPHONE (OUTPATIENT)
Dept: RHEUMATOLOGY | Facility: CLINIC | Age: 68
End: 2023-02-24

## 2023-02-24 ENCOUNTER — TELEPHONE (OUTPATIENT)
Dept: OBGYN CLINIC | Facility: HOSPITAL | Age: 68
End: 2023-02-24

## 2023-02-24 DIAGNOSIS — M25.50 POLYARTHRALGIA: ICD-10-CM

## 2023-02-24 DIAGNOSIS — M06.00 SERONEGATIVE RHEUMATOID ARTHRITIS (HCC): ICD-10-CM

## 2023-02-24 LAB
HBV DNA SERPL NAA+PROBE-ACNC: NORMAL IU/ML
HBV DNA SERPL NAA+PROBE-LOG IU: NORMAL LOG10 IU/ML
REF LAB TEST REF RANGE: NORMAL

## 2023-02-24 RX ORDER — SULFASALAZINE 500 MG/1
1000 TABLET ORAL 2 TIMES DAILY
Qty: 360 TABLET | Refills: 1 | Status: SHIPPED | OUTPATIENT
Start: 2023-02-24 | End: 2023-05-25

## 2023-02-24 NOTE — RESULT ENCOUNTER NOTE
Relayed message to pt via [employer] message. Pt had expressed in previous phone call that [employer] message was okay.

## 2023-02-24 NOTE — TELEPHONE ENCOUNTER
Caller: Patient     Doctor: Ricardo Vazquez    Reason for call: patient is at the pharmacy and they are advising her they only have 1 month of the sulfaSALAzine and stating they are not going to be getting it anymore  Patient would like to know what she should do? Should she still  this medication or switch to something else?  She's requesting a callback as soon as possible    Call back#: [phone]

## 2023-02-24 NOTE — TELEPHONE ENCOUNTER
Called and spoke to pt  Pt would like Rx sent to [facility] on file in [place], [place]     Address: [address] [address]
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
## 2023-03-17 ENCOUNTER — CONSULT (OUTPATIENT)
Dept: HEMATOLOGY ONCOLOGY | Facility: CLINIC | Age: 68
End: 2023-03-17

## 2023-03-17 VITALS
RESPIRATION RATE: 16 BRPM | OXYGEN SATURATION: 95 % | WEIGHT: 175 LBS | DIASTOLIC BLOOD PRESSURE: 68 MMHG | HEART RATE: 83 BPM | BODY MASS INDEX: 28.12 KG/M2 | HEIGHT: 66 IN | SYSTOLIC BLOOD PRESSURE: 124 MMHG | TEMPERATURE: 98 F

## 2023-03-17 DIAGNOSIS — G60.9 IDIOPATHIC PERIPHERAL NEUROPATHY: ICD-10-CM

## 2023-03-17 DIAGNOSIS — E83.52 HYPERCALCEMIA: ICD-10-CM

## 2023-03-23 ENCOUNTER — APPOINTMENT (OUTPATIENT)
Dept: LAB | Facility: CLINIC | Age: 68
End: 2023-03-23

## 2023-03-23 DIAGNOSIS — M06.00 SERONEGATIVE RHEUMATOID ARTHRITIS (HCC): ICD-10-CM

## 2023-03-23 DIAGNOSIS — Z79.899 HIGH RISK MEDICATION USE: ICD-10-CM

## 2023-03-23 DIAGNOSIS — M25.50 POLYARTHRALGIA: ICD-10-CM

## 2023-03-23 LAB
ALBUMIN SERPL BCP-MCNC: 3.5 G/DL (ref 3.5–5)
ALP SERPL-CCNC: 51 U/L (ref 46–116)
ALT SERPL W P-5'-P-CCNC: 18 U/L (ref 12–78)
ANION GAP SERPL CALCULATED.3IONS-SCNC: 6 MMOL/L (ref 4–13)
AST SERPL W P-5'-P-CCNC: 13 U/L (ref 5–45)
BASOPHILS # BLD AUTO: 0.04 THOUSANDS/ÂΜL (ref 0–0.1)
BASOPHILS NFR BLD AUTO: 1 % (ref 0–1)
BILIRUB SERPL-MCNC: 0.36 MG/DL (ref 0.2–1)
BUN SERPL-MCNC: 21 MG/DL (ref 5–25)
CALCIUM SERPL-MCNC: 10 MG/DL (ref 8.3–10.1)
CHLORIDE SERPL-SCNC: 105 MMOL/L (ref 96–108)
CO2 SERPL-SCNC: 25 MMOL/L (ref 21–32)
CREAT SERPL-MCNC: 0.79 MG/DL (ref 0.6–1.3)
CRP SERPL QL: 20.1 MG/L
EOSINOPHIL # BLD AUTO: 0.13 THOUSAND/ÂΜL (ref 0–0.61)
EOSINOPHIL NFR BLD AUTO: 2 % (ref 0–6)
ERYTHROCYTE [DISTWIDTH] IN BLOOD BY AUTOMATED COUNT: 13.5 % (ref 11.6–15.1)
ERYTHROCYTE [SEDIMENTATION RATE] IN BLOOD: 57 MM/HOUR (ref 0–29)
GFR SERPL CREATININE-BSD FRML MDRD: 77 ML/MIN/1.73SQ M
GLUCOSE P FAST SERPL-MCNC: 95 MG/DL (ref 65–99)
HCT VFR BLD AUTO: 36 % (ref 34.8–46.1)
HGB BLD-MCNC: 11.4 G/DL (ref 11.5–15.4)
IMM GRANULOCYTES # BLD AUTO: 0.02 THOUSAND/UL (ref 0–0.2)
IMM GRANULOCYTES NFR BLD AUTO: 0 % (ref 0–2)
LYMPHOCYTES # BLD AUTO: 1.01 THOUSANDS/ÂΜL (ref 0.6–4.47)
LYMPHOCYTES NFR BLD AUTO: 17 % (ref 14–44)
MCH RBC QN AUTO: 27.5 PG (ref 26.8–34.3)
MCHC RBC AUTO-ENTMCNC: 31.7 G/DL (ref 31.4–37.4)
MCV RBC AUTO: 87 FL (ref 82–98)
MONOCYTES # BLD AUTO: 0.67 THOUSAND/ÂΜL (ref 0.17–1.22)
MONOCYTES NFR BLD AUTO: 11 % (ref 4–12)
NEUTROPHILS # BLD AUTO: 4.2 THOUSANDS/ÂΜL (ref 1.85–7.62)
NEUTS SEG NFR BLD AUTO: 69 % (ref 43–75)
NRBC BLD AUTO-RTO: 0 /100 WBCS
PLATELET # BLD AUTO: 356 THOUSANDS/UL (ref 149–390)
PMV BLD AUTO: 10.3 FL (ref 8.9–12.7)
POTASSIUM SERPL-SCNC: 4.1 MMOL/L (ref 3.5–5.3)
PROT SERPL-MCNC: 7.9 G/DL (ref 6.4–8.4)
RBC # BLD AUTO: 4.14 MILLION/UL (ref 3.81–5.12)
SODIUM SERPL-SCNC: 136 MMOL/L (ref 135–147)
WBC # BLD AUTO: 6.07 THOUSAND/UL (ref 4.31–10.16)

## 2023-03-31 ENCOUNTER — APPOINTMENT (OUTPATIENT)
Dept: LAB | Facility: CLINIC | Age: 68
End: 2023-03-31

## 2023-03-31 DIAGNOSIS — E78.2 MIXED HYPERLIPIDEMIA: ICD-10-CM

## 2023-03-31 DIAGNOSIS — E55.9 VITAMIN D DEFICIENCY: ICD-10-CM

## 2023-03-31 LAB
25(OH)D3 SERPL-MCNC: 24.6 NG/ML (ref 30–100)
ALBUMIN SERPL BCP-MCNC: 3.1 G/DL (ref 3.5–5)
ALP SERPL-CCNC: 48 U/L (ref 46–116)
ALT SERPL W P-5'-P-CCNC: 16 U/L (ref 12–78)
ANION GAP SERPL CALCULATED.3IONS-SCNC: 4 MMOL/L (ref 4–13)
AST SERPL W P-5'-P-CCNC: 12 U/L (ref 5–45)
BASOPHILS # BLD AUTO: 0.04 THOUSANDS/ÂΜL (ref 0–0.1)
BASOPHILS NFR BLD AUTO: 1 % (ref 0–1)
BILIRUB SERPL-MCNC: 0.24 MG/DL (ref 0.2–1)
BUN SERPL-MCNC: 18 MG/DL (ref 5–25)
CALCIUM ALBUM COR SERPL-MCNC: 10 MG/DL (ref 8.3–10.1)
CALCIUM SERPL-MCNC: 9.3 MG/DL (ref 8.3–10.1)
CHLORIDE SERPL-SCNC: 103 MMOL/L (ref 96–108)
CHOLEST SERPL-MCNC: 223 MG/DL
CO2 SERPL-SCNC: 26 MMOL/L (ref 21–32)
CREAT SERPL-MCNC: 0.68 MG/DL (ref 0.6–1.3)
EOSINOPHIL # BLD AUTO: 0.11 THOUSAND/ÂΜL (ref 0–0.61)
EOSINOPHIL NFR BLD AUTO: 2 % (ref 0–6)
ERYTHROCYTE [DISTWIDTH] IN BLOOD BY AUTOMATED COUNT: 13.8 % (ref 11.6–15.1)
GFR SERPL CREATININE-BSD FRML MDRD: 90 ML/MIN/1.73SQ M
GLUCOSE P FAST SERPL-MCNC: 90 MG/DL (ref 65–99)
HCT VFR BLD AUTO: 33.7 % (ref 34.8–46.1)
HDLC SERPL-MCNC: 55 MG/DL
HGB BLD-MCNC: 10.8 G/DL (ref 11.5–15.4)
IMM GRANULOCYTES # BLD AUTO: 0.01 THOUSAND/UL (ref 0–0.2)
IMM GRANULOCYTES NFR BLD AUTO: 0 % (ref 0–2)
LDLC SERPL CALC-MCNC: 145 MG/DL (ref 0–100)
LYMPHOCYTES # BLD AUTO: 0.87 THOUSANDS/ÂΜL (ref 0.6–4.47)
LYMPHOCYTES NFR BLD AUTO: 16 % (ref 14–44)
MCH RBC QN AUTO: 28.1 PG (ref 26.8–34.3)
MCHC RBC AUTO-ENTMCNC: 32 G/DL (ref 31.4–37.4)
MCV RBC AUTO: 88 FL (ref 82–98)
MONOCYTES # BLD AUTO: 0.6 THOUSAND/ÂΜL (ref 0.17–1.22)
MONOCYTES NFR BLD AUTO: 11 % (ref 4–12)
NEUTROPHILS # BLD AUTO: 3.86 THOUSANDS/ÂΜL (ref 1.85–7.62)
NEUTS SEG NFR BLD AUTO: 70 % (ref 43–75)
NONHDLC SERPL-MCNC: 168 MG/DL
NRBC BLD AUTO-RTO: 0 /100 WBCS
PLATELET # BLD AUTO: 409 THOUSANDS/UL (ref 149–390)
PMV BLD AUTO: 10 FL (ref 8.9–12.7)
POTASSIUM SERPL-SCNC: 4.2 MMOL/L (ref 3.5–5.3)
PROT SERPL-MCNC: 7.4 G/DL (ref 6.4–8.4)
RBC # BLD AUTO: 3.85 MILLION/UL (ref 3.81–5.12)
SODIUM SERPL-SCNC: 133 MMOL/L (ref 135–147)
TRIGL SERPL-MCNC: 114 MG/DL
TSH SERPL DL<=0.05 MIU/L-ACNC: 5.49 UIU/ML (ref 0.45–4.5)
WBC # BLD AUTO: 5.49 THOUSAND/UL (ref 4.31–10.16)

## 2023-04-06 ENCOUNTER — APPOINTMENT (OUTPATIENT)
Age: 68
End: 2023-04-06

## 2023-04-06 ENCOUNTER — OFFICE VISIT (OUTPATIENT)
Age: 68
End: 2023-04-06

## 2023-04-06 VITALS
OXYGEN SATURATION: 99 % | WEIGHT: 181.2 LBS | BODY MASS INDEX: 29.12 KG/M2 | HEART RATE: 77 BPM | HEIGHT: 66 IN | SYSTOLIC BLOOD PRESSURE: 138 MMHG | RESPIRATION RATE: 16 BRPM | DIASTOLIC BLOOD PRESSURE: 78 MMHG | TEMPERATURE: 98.1 F

## 2023-04-06 DIAGNOSIS — I10 ESSENTIAL HYPERTENSION: Primary | ICD-10-CM

## 2023-04-06 DIAGNOSIS — E66.3 OVERWEIGHT: ICD-10-CM

## 2023-04-06 DIAGNOSIS — E03.9 ACQUIRED HYPOTHYROIDISM: ICD-10-CM

## 2023-04-06 DIAGNOSIS — E78.2 MIXED HYPERLIPIDEMIA: ICD-10-CM

## 2023-04-06 DIAGNOSIS — R07.81 PLEURITIC PAIN: ICD-10-CM

## 2023-04-06 DIAGNOSIS — E55.9 VITAMIN D DEFICIENCY: ICD-10-CM

## 2023-04-06 DIAGNOSIS — M06.09 RHEUMATOID ARTHRITIS OF MULTIPLE SITES WITH NEGATIVE RHEUMATOID FACTOR (HCC): ICD-10-CM
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
## 2023-04-28 ENCOUNTER — TELEPHONE (OUTPATIENT)
Dept: OBGYN CLINIC | Facility: MEDICAL CENTER | Age: 68
End: 2023-04-28

## 2023-04-28 DIAGNOSIS — M06.00 SERONEGATIVE RHEUMATOID ARTHRITIS (HCC): ICD-10-CM

## 2023-04-28 DIAGNOSIS — M25.50 POLYARTHRALGIA: ICD-10-CM

## 2023-04-28 RX ORDER — MELOXICAM 7.5 MG/1
7.5 TABLET ORAL DAILY
Qty: 90 TABLET | Refills: 1 | Status: SHIPPED | OUTPATIENT
Start: 2023-04-28 | End: 2023-05-01 | Stop reason: SDUPTHER

## 2023-04-28 NOTE — TELEPHONE ENCOUNTER
Pt contacted Call Center requested refill of their medication  Medication Name:   meloxicam (Mobic) 7 5 mg tablet         Dosage of Med: 7 5 mg       Frequency of Med: per the patient 2x daily       Remaining Medication: 0      [facility], [address]   [place] [address] [place] [address], [address] [address] [address]   Phone:  [phone]  Fax:  [phone]          Pt  Preferred Callback Phone Number:[phone]     Thank you  PLEASE ADVISE PATIENTS:    REFILL REQUESTS WILL BE PROCESSED WITHIN 24-48 HOURS

## 2023-05-01 ENCOUNTER — OFFICE VISIT (OUTPATIENT)
Age: 68
End: 2023-05-01

## 2023-05-01 VITALS — BODY MASS INDEX: 28.61 KG/M2 | HEIGHT: 66 IN | WEIGHT: 178 LBS

## 2023-05-01 DIAGNOSIS — I87.322 STASIS DERMATITIS OF LEFT LOWER EXTREMITY DUE TO PERIPHERAL VENOUS HYPERTENSION: Primary | ICD-10-CM

## 2023-05-01 DIAGNOSIS — M06.00 SERONEGATIVE RHEUMATOID ARTHRITIS (HCC): ICD-10-CM

## 2023-05-01 DIAGNOSIS — M25.50 POLYARTHRALGIA: ICD-10-CM

## 2023-05-01 RX ORDER — CEPHALEXIN 500 MG/1
500 CAPSULE ORAL EVERY 8 HOURS SCHEDULED
Qty: 21 CAPSULE | Refills: 0 | Status: SHIPPED | OUTPATIENT
Start: 2023-05-01 | End: 2023-05-08

## 2023-05-01 RX ORDER — MELOXICAM 7.5 MG/1
7.5 TABLET ORAL 2 TIMES DAILY
Qty: 180 TABLET | Refills: 1 | Status: SHIPPED | OUTPATIENT
Start: 2023-05-01 | End: 2023-07-30

## 2023-05-01 NOTE — PROGRESS NOTES
"[facility]     Patient Name: Danielle Sylvester  Encounter Date: May 1, 2023     Have you been cared for by a [place] [address] Dermatologist in the last 3 years and, if so, which description applies to you? Yes  I have been here within the last 3 years, and my medical history has NOT changed since that time  I am FEMALE/of child-bearing potential     REVIEW OF SYSTEMS:  Have you recently had or currently have any of the following? · No changes in my recent health  PAST MEDICAL HISTORY:  Have you personally ever had or currently have any of the following? If \"YES,\" then please provide more detail  · No changes in my medical history  FAMILY HISTORY:  Any \"first degree relatives\" (parent, brother, sister, or child) with the following?  No changes in my family's known health  PATIENT EXPERIENCE:     Do you want the Dermatologist to perform a COMPLETE skin exam today including a clinical examination under the \"bra and underwear\" areas? NO   If necessary, do we have your permission to call and leave a detailed message on your Preferred Phone number that includes your specific medical information?   Yes      Allergies   Allergen Reactions    Celecoxib Edema    Voltaren [Diclofenac Sodium] Swelling and Other (See Comments)     Bleeding      Current Outpatient Medications:     B Complex Vitamins (B COMPLEX 1 PO), Take by mouth, Disp: , Rfl:     cholecalciferol (VITAMIN D3) 1,000 units tablet, Take 1,000 Units by mouth daily, Disp: , Rfl:     Emollient (COLLAGEN EX), Apply topically, Disp: , Rfl:     fluticasone (FLONASE) 50 mcg/act nasal spray, SPRAY 1 SQUIRT INTO THE NOSTRILS TWICE DAILY, Disp: , Rfl:     gabapentin (NEURONTIN) 600 MG tablet, TAKE 1 TABLET(600 MG) BY MOUTH TWICE DAILY, Disp: 180 tablet, Rfl: 3    latanoprost (XALATAN) 0 005 % ophthalmic solution, INSTILL 1 DROP IN BOTH EYES EVERY DAY AT BEDTIME, Disp: , Rfl:     levocetirizine (XYZAL) 5 MG tablet, Take 5 mg by " mouth daily, Disp: , Rfl:     meloxicam (Mobic) 7 5 mg tablet, Take 1 tablet (7 5 mg total) by mouth daily, Disp: 90 tablet, Rfl: 1    olmesartan (BENICAR) 40 mg tablet, Take 1 tablet (40 mg total) by mouth daily, Disp: 90 tablet, Rfl: 3    Omega-3 Fatty Acids (FISH OIL PO), Take by mouth, Disp: , Rfl:     sulfaSALAzine (AZULFIDINE) 500 mg tablet, Take 2 tablets (1,000 mg total) by mouth 2 (two) times a day 1 tab once daily x 1 week, then 1 tab twice daily x 1 week, then 2 tabs twice daily after that , Disp: 360 tablet, Rfl: 1    hydrochlorothiazide (HYDRODIURIL) 25 mg tablet, TAKE 1 TABLET(25 MG) BY MOUTH DAILY (Patient not taking: Reported on 5/1/2023), Disp: 90 tablet, Rfl: 3    tiZANidine (ZANAFLEX) 4 mg tablet, Take 1 tablet (4 mg total) by mouth 2 (two) times a day (Patient not taking: Reported on 5/1/2023), Disp: 20 tablet, Rfl: 0    Turmeric (QC TUMERIC COMPLEX PO), Take by mouth (Patient not taking: Reported on 5/1/2023), Disp: , Rfl:            Whom besides the patient is providing clinical information about today's encounter?   o NO ADDITIONAL HISTORIAN (patient alone provided history)    Physical Exam and Assessment/Plan by Diagnosis:

## 2023-05-01 NOTE — TELEPHONE ENCOUNTER
Caller: Patient    Doctor: Cynthia Holbrook PA-C    Reason for call: Patient calling stating the Meloxicam was sent to the pharmacy but in the past she was taking Meloxicam BID and the current subscription is stating take one tablet daily  Pharmacy calling asking if the frequency can be corrected and a new prescription can be sent to pharmacy      Call back#: [phone]

## 2023-05-01 NOTE — PATIENT INSTRUCTIONS
This appears to be consistent with some type of stasis issue but also concerned regarding secondary infection present we will place patient on both the topical steroid and cephalexin to see if we get this under control if no improvement may need further work-up for consideration for potential blood clot
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
## 2023-05-10 ENCOUNTER — HOSPITAL ENCOUNTER (EMERGENCY)
Facility: HOSPITAL | Age: 68
Discharge: HOME | End: 2023-05-10
Attending: EMERGENCY MEDICINE

## 2023-05-10 ENCOUNTER — APPOINTMENT (EMERGENCY)
Dept: CT IMAGING | Facility: HOSPITAL | Age: 68
End: 2023-05-10

## 2023-05-10 ENCOUNTER — APPOINTMENT (EMERGENCY)
Dept: VASCULAR ULTRASOUND | Facility: HOSPITAL | Age: 68
End: 2023-05-10

## 2023-05-10 VITALS
HEART RATE: 83 BPM | BODY MASS INDEX: 28.61 KG/M2 | TEMPERATURE: 97.9 F | RESPIRATION RATE: 16 BRPM | OXYGEN SATURATION: 95 % | SYSTOLIC BLOOD PRESSURE: 116 MMHG | HEIGHT: 66 IN | DIASTOLIC BLOOD PRESSURE: 62 MMHG | WEIGHT: 178 LBS

## 2023-05-10 DIAGNOSIS — D35.00 ADRENAL ADENOMA: ICD-10-CM

## 2023-05-10 DIAGNOSIS — T78.40XA ALLERGIC REACTION, INITIAL ENCOUNTER: Primary | ICD-10-CM

## 2023-05-10 LAB
2HR DELTA HS TROPONIN: -1 NG/L
ALBUMIN SERPL BCP-MCNC: 3.9 G/DL (ref 3.5–5)
ALP SERPL-CCNC: 54 U/L (ref 34–104)
ALT SERPL W P-5'-P-CCNC: 11 U/L (ref 7–52)
ANION GAP SERPL CALCULATED.3IONS-SCNC: 6 MMOL/L (ref 4–13)
AST SERPL W P-5'-P-CCNC: 14 U/L (ref 13–39)
BASOPHILS # BLD AUTO: 0.03 THOUSANDS/ÂΜL (ref 0–0.1)
BASOPHILS NFR BLD AUTO: 0 % (ref 0–1)
BILIRUB SERPL-MCNC: 0.48 MG/DL (ref 0.2–1)
BUN SERPL-MCNC: 26 MG/DL (ref 5–25)
CALCIUM SERPL-MCNC: 10.1 MG/DL (ref 8.4–10.2)
CARDIAC TROPONIN I PNL SERPL HS: 4 NG/L
CARDIAC TROPONIN I PNL SERPL HS: 5 NG/L
CHLORIDE SERPL-SCNC: 99 MMOL/L (ref 96–108)
CO2 SERPL-SCNC: 28 MMOL/L (ref 21–32)
CREAT SERPL-MCNC: 0.73 MG/DL (ref 0.6–1.3)
EOSINOPHIL # BLD AUTO: 0.13 THOUSAND/ÂΜL (ref 0–0.61)
EOSINOPHIL NFR BLD AUTO: 2 % (ref 0–6)
ERYTHROCYTE [DISTWIDTH] IN BLOOD BY AUTOMATED COUNT: 14.3 % (ref 11.6–15.1)
GFR SERPL CREATININE-BSD FRML MDRD: 85 ML/MIN/1.73SQ M
GLUCOSE SERPL-MCNC: 108 MG/DL (ref 65–140)
HCT VFR BLD AUTO: 33.6 % (ref 34.8–46.1)
HGB BLD-MCNC: 10.9 G/DL (ref 11.5–15.4)
IMM GRANULOCYTES # BLD AUTO: 0.03 THOUSAND/UL (ref 0–0.2)
IMM GRANULOCYTES NFR BLD AUTO: 0 % (ref 0–2)
LYMPHOCYTES # BLD AUTO: 0.98 THOUSANDS/ÂΜL (ref 0.6–4.47)
LYMPHOCYTES NFR BLD AUTO: 13 % (ref 14–44)
MCH RBC QN AUTO: 27.7 PG (ref 26.8–34.3)
MCHC RBC AUTO-ENTMCNC: 32.4 G/DL (ref 31.4–37.4)
MCV RBC AUTO: 86 FL (ref 82–98)
MONOCYTES # BLD AUTO: 0.85 THOUSAND/ÂΜL (ref 0.17–1.22)
MONOCYTES NFR BLD AUTO: 12 % (ref 4–12)
NEUTROPHILS # BLD AUTO: 5.31 THOUSANDS/ÂΜL (ref 1.85–7.62)
NEUTS SEG NFR BLD AUTO: 73 % (ref 43–75)
NRBC BLD AUTO-RTO: 0 /100 WBCS
PLATELET # BLD AUTO: 373 THOUSANDS/UL (ref 149–390)
PMV BLD AUTO: 9.1 FL (ref 8.9–12.7)
POTASSIUM SERPL-SCNC: 3.8 MMOL/L (ref 3.5–5.3)
PROT SERPL-MCNC: 7.9 G/DL (ref 6.4–8.4)
RBC # BLD AUTO: 3.93 MILLION/UL (ref 3.81–5.12)
SODIUM SERPL-SCNC: 133 MMOL/L (ref 135–147)
WBC # BLD AUTO: 7.33 THOUSAND/UL (ref 4.31–10.16)

## 2023-05-10 RX ORDER — MORPHINE SULFATE 4 MG/ML
4 INJECTION, SOLUTION INTRAMUSCULAR; INTRAVENOUS ONCE
Status: COMPLETED | OUTPATIENT
Start: 2023-05-10 | End: 2023-05-10

## 2023-05-10 RX ORDER — FAMOTIDINE 20 MG/1
20 TABLET, FILM COATED ORAL 2 TIMES DAILY
Qty: 30 TABLET | Refills: 0 | Status: SHIPPED | OUTPATIENT
Start: 2023-05-10

## 2023-05-10 RX ORDER — MORPHINE SULFATE 4 MG/ML
4 INJECTION, SOLUTION INTRAMUSCULAR; INTRAVENOUS ONCE
Status: DISCONTINUED | OUTPATIENT
Start: 2023-05-10 | End: 2023-05-10 | Stop reason: HOSPADM

## 2023-05-10 RX ORDER — KETOROLAC TROMETHAMINE 30 MG/ML
15 INJECTION, SOLUTION INTRAMUSCULAR; INTRAVENOUS ONCE
Status: COMPLETED | OUTPATIENT
Start: 2023-05-10 | End: 2023-05-10

## 2023-05-10 RX ORDER — PREDNISONE 20 MG/1
60 TABLET ORAL DAILY
Qty: 15 TABLET | Refills: 0 | Status: SHIPPED | OUTPATIENT
Start: 2023-05-10 | End: 2023-05-15

## 2023-05-10 RX ORDER — FAMOTIDINE 10 MG/ML
20 INJECTION, SOLUTION INTRAVENOUS ONCE
Status: COMPLETED | OUTPATIENT
Start: 2023-05-10 | End: 2023-05-10

## 2023-05-10 RX ADMIN — KETOROLAC TROMETHAMINE 15 MG: 30 INJECTION, SOLUTION INTRAMUSCULAR; INTRAVENOUS at 12:53

## 2023-05-10 RX ADMIN — FAMOTIDINE 20 MG: 10 INJECTION, SOLUTION INTRAVENOUS at 12:54

## 2023-05-10 RX ADMIN — MORPHINE SULFATE 4 MG: 4 INJECTION INTRAVENOUS at 12:53

## 2023-05-10 RX ADMIN — IOHEXOL 85 ML: 350 INJECTION, SOLUTION INTRAVENOUS at 13:27

## 2023-05-10 NOTE — DISCHARGE INSTRUCTIONS
Follow up with primary care for recheck and adrenal adenoma which will likely need follow up in 6 months
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
## 2023-05-11 LAB
ATRIAL RATE: 93 BPM
P AXIS: 64 DEGREES
PR INTERVAL: 142 MS
QRS AXIS: 81 DEGREES
QRSD INTERVAL: 76 MS
QT INTERVAL: 344 MS
QTC INTERVAL: 427 MS
T WAVE AXIS: 44 DEGREES
VENTRICULAR RATE: 93 BPM

## 2023-05-16 ENCOUNTER — APPOINTMENT (OUTPATIENT)
Dept: LAB | Facility: HOSPITAL | Age: 68
End: 2023-05-16

## 2023-05-16 ENCOUNTER — RA CDI HCC (OUTPATIENT)
Dept: OTHER | Facility: HOSPITAL | Age: 68
End: 2023-05-16

## 2023-05-16 DIAGNOSIS — Z79.899 HIGH RISK MEDICATION USE: ICD-10-CM

## 2023-05-16 DIAGNOSIS — M06.00 SERONEGATIVE RHEUMATOID ARTHRITIS (HCC): ICD-10-CM

## 2023-05-16 LAB
ALBUMIN SERPL BCP-MCNC: 4.2 G/DL (ref 3.5–5)
ALP SERPL-CCNC: 49 U/L (ref 34–104)
ALT SERPL W P-5'-P-CCNC: 11 U/L (ref 7–52)
ANION GAP SERPL CALCULATED.3IONS-SCNC: 6 MMOL/L (ref 4–13)
AST SERPL W P-5'-P-CCNC: 11 U/L (ref 13–39)
BASOPHILS # BLD AUTO: 0.07 THOUSANDS/ÂΜL (ref 0–0.1)
BASOPHILS NFR BLD AUTO: 1 % (ref 0–1)
BILIRUB SERPL-MCNC: 0.36 MG/DL (ref 0.2–1)
BUN SERPL-MCNC: 25 MG/DL (ref 5–25)
CALCIUM SERPL-MCNC: 10 MG/DL (ref 8.4–10.2)
CHLORIDE SERPL-SCNC: 105 MMOL/L (ref 96–108)
CO2 SERPL-SCNC: 28 MMOL/L (ref 21–32)
CREAT SERPL-MCNC: 0.89 MG/DL (ref 0.6–1.3)
CRP SERPL QL: 5.6 MG/L
EOSINOPHIL # BLD AUTO: 0.07 THOUSAND/ÂΜL (ref 0–0.61)
EOSINOPHIL NFR BLD AUTO: 1 % (ref 0–6)
ERYTHROCYTE [DISTWIDTH] IN BLOOD BY AUTOMATED COUNT: 14 % (ref 11.6–15.1)
ERYTHROCYTE [SEDIMENTATION RATE] IN BLOOD: 40 MM/HOUR (ref 0–29)
GFR SERPL CREATININE-BSD FRML MDRD: 67 ML/MIN/1.73SQ M
GLUCOSE P FAST SERPL-MCNC: 91 MG/DL (ref 65–99)
HCT VFR BLD AUTO: 36.6 % (ref 34.8–46.1)
HGB BLD-MCNC: 11.6 G/DL (ref 11.5–15.4)
IMM GRANULOCYTES # BLD AUTO: 0.06 THOUSAND/UL (ref 0–0.2)
IMM GRANULOCYTES NFR BLD AUTO: 1 % (ref 0–2)
LYMPHOCYTES # BLD AUTO: 1.83 THOUSANDS/ÂΜL (ref 0.6–4.47)
LYMPHOCYTES NFR BLD AUTO: 22 % (ref 14–44)
MCH RBC QN AUTO: 27.4 PG (ref 26.8–34.3)
MCHC RBC AUTO-ENTMCNC: 31.7 G/DL (ref 31.4–37.4)
MCV RBC AUTO: 87 FL (ref 82–98)
MONOCYTES # BLD AUTO: 0.77 THOUSAND/ÂΜL (ref 0.17–1.22)
MONOCYTES NFR BLD AUTO: 9 % (ref 4–12)
NEUTROPHILS # BLD AUTO: 5.48 THOUSANDS/ÂΜL (ref 1.85–7.62)
NEUTS SEG NFR BLD AUTO: 66 % (ref 43–75)
NRBC BLD AUTO-RTO: 0 /100 WBCS
PLATELET # BLD AUTO: 505 THOUSANDS/UL (ref 149–390)
PMV BLD AUTO: 9.1 FL (ref 8.9–12.7)
POTASSIUM SERPL-SCNC: 4.8 MMOL/L (ref 3.5–5.3)
PROT SERPL-MCNC: 7.9 G/DL (ref 6.4–8.4)
RBC # BLD AUTO: 4.23 MILLION/UL (ref 3.81–5.12)
SODIUM SERPL-SCNC: 139 MMOL/L (ref 135–147)
WBC # BLD AUTO: 8.28 THOUSAND/UL (ref 4.31–10.16)

## 2023-05-16 NOTE — PROGRESS NOTES
[address]  coding opportunities       Chart reviewed, no opportunity found: CHART REVIEWED, NO OPPORTUNITY FOUND        Patients Insurance     Medicare Insurance: Medicare

## 2023-05-22 ENCOUNTER — OFFICE VISIT (OUTPATIENT)
Dept: FAMILY MEDICINE CLINIC | Facility: CLINIC | Age: 68
End: 2023-05-22

## 2023-05-22 ENCOUNTER — APPOINTMENT (OUTPATIENT)
Dept: LAB | Facility: CLINIC | Age: 68
End: 2023-05-22

## 2023-05-22 VITALS
OXYGEN SATURATION: 98 % | HEART RATE: 65 BPM | DIASTOLIC BLOOD PRESSURE: 78 MMHG | TEMPERATURE: 98 F | HEIGHT: 66 IN | SYSTOLIC BLOOD PRESSURE: 128 MMHG | BODY MASS INDEX: 28.12 KG/M2 | WEIGHT: 175 LBS

## 2023-05-22 DIAGNOSIS — G89.29 CHRONIC PAIN OF BOTH SHOULDERS: ICD-10-CM

## 2023-05-22 DIAGNOSIS — D35.01 ADRENAL ADENOMA, RIGHT: ICD-10-CM

## 2023-05-22 DIAGNOSIS — M17.2 BILATERAL POST-TRAUMATIC OSTEOARTHRITIS OF KNEE: ICD-10-CM

## 2023-05-22 DIAGNOSIS — R70.0 ELEVATED SED RATE: ICD-10-CM

## 2023-05-22 DIAGNOSIS — E21.3 HYPERPARATHYROIDISM (HCC): ICD-10-CM

## 2023-05-22 DIAGNOSIS — M79.642 BILATERAL HAND PAIN: ICD-10-CM

## 2023-05-22 DIAGNOSIS — M79.641 BILATERAL HAND PAIN: ICD-10-CM

## 2023-05-22 DIAGNOSIS — M25.512 CHRONIC PAIN OF BOTH SHOULDERS: ICD-10-CM

## 2023-05-22 DIAGNOSIS — M06.09 RHEUMATOID ARTHRITIS OF MULTIPLE SITES WITH NEGATIVE RHEUMATOID FACTOR (HCC): Primary | ICD-10-CM

## 2023-05-22 DIAGNOSIS — I10 ESSENTIAL HYPERTENSION: ICD-10-CM

## 2023-05-22 DIAGNOSIS — E03.9 ACQUIRED HYPOTHYROIDISM: ICD-10-CM

## 2023-05-22 DIAGNOSIS — M25.511 CHRONIC PAIN OF BOTH SHOULDERS: ICD-10-CM

## 2023-05-22 DIAGNOSIS — M19.90 ARTHRITIS: ICD-10-CM

## 2023-05-22 DIAGNOSIS — E78.2 MIXED HYPERLIPIDEMIA: ICD-10-CM

## 2023-05-22 LAB
ALBUMIN SERPL BCP-MCNC: 3.4 G/DL (ref 3.5–5)
ALP SERPL-CCNC: 53 U/L (ref 46–116)
ALT SERPL W P-5'-P-CCNC: 25 U/L (ref 12–78)
ANION GAP SERPL CALCULATED.3IONS-SCNC: 0 MMOL/L (ref 4–13)
AST SERPL W P-5'-P-CCNC: 17 U/L (ref 5–45)
B BURGDOR IGG+IGM SER-ACNC: <0.2 AI
BASOPHILS # BLD AUTO: 0.04 THOUSANDS/ÂΜL (ref 0–0.1)
BASOPHILS NFR BLD AUTO: 1 % (ref 0–1)
BILIRUB SERPL-MCNC: 0.38 MG/DL (ref 0.2–1)
BUN SERPL-MCNC: 24 MG/DL (ref 5–25)
CALCIUM ALBUM COR SERPL-MCNC: 9.9 MG/DL (ref 8.3–10.1)
CALCIUM SERPL-MCNC: 9.4 MG/DL (ref 8.3–10.1)
CHLORIDE SERPL-SCNC: 110 MMOL/L (ref 96–108)
CHOLEST SERPL-MCNC: 161 MG/DL
CO2 SERPL-SCNC: 27 MMOL/L (ref 21–32)
CREAT SERPL-MCNC: 0.75 MG/DL (ref 0.6–1.3)
CRP SERPL QL: 30.5 MG/L
EOSINOPHIL # BLD AUTO: 0.19 THOUSAND/ÂΜL (ref 0–0.61)
EOSINOPHIL NFR BLD AUTO: 3 % (ref 0–6)
ERYTHROCYTE [DISTWIDTH] IN BLOOD BY AUTOMATED COUNT: 15.1 % (ref 11.6–15.1)
ERYTHROCYTE [SEDIMENTATION RATE] IN BLOOD: 36 MM/HOUR (ref 0–29)
GFR SERPL CREATININE-BSD FRML MDRD: 82 ML/MIN/1.73SQ M
GLUCOSE P FAST SERPL-MCNC: 89 MG/DL (ref 65–99)
HCT VFR BLD AUTO: 36.4 % (ref 34.8–46.1)
HDLC SERPL-MCNC: 58 MG/DL
HGB BLD-MCNC: 12 G/DL (ref 11.5–15.4)
IMM GRANULOCYTES # BLD AUTO: 0.02 THOUSAND/UL (ref 0–0.2)
IMM GRANULOCYTES NFR BLD AUTO: 0 % (ref 0–2)
LDLC SERPL CALC-MCNC: 86 MG/DL (ref 0–100)
LYMPHOCYTES # BLD AUTO: 1.12 THOUSANDS/ÂΜL (ref 0.6–4.47)
LYMPHOCYTES NFR BLD AUTO: 17 % (ref 14–44)
MCH RBC QN AUTO: 28.4 PG (ref 26.8–34.3)
MCHC RBC AUTO-ENTMCNC: 33 G/DL (ref 31.4–37.4)
MCV RBC AUTO: 86 FL (ref 82–98)
MONOCYTES # BLD AUTO: 0.64 THOUSAND/ÂΜL (ref 0.17–1.22)
MONOCYTES NFR BLD AUTO: 10 % (ref 4–12)
NEUTROPHILS # BLD AUTO: 4.5 THOUSANDS/ÂΜL (ref 1.85–7.62)
NEUTS SEG NFR BLD AUTO: 69 % (ref 43–75)
NRBC BLD AUTO-RTO: 0 /100 WBCS
PLATELET # BLD AUTO: 395 THOUSANDS/UL (ref 149–390)
PMV BLD AUTO: 10.1 FL (ref 8.9–12.7)
POTASSIUM SERPL-SCNC: 3.9 MMOL/L (ref 3.5–5.3)
PROT SERPL-MCNC: 7.8 G/DL (ref 6.4–8.4)
RBC # BLD AUTO: 4.23 MILLION/UL (ref 3.81–5.12)
SODIUM SERPL-SCNC: 137 MMOL/L (ref 135–147)
T4 FREE SERPL-MCNC: 1.28 NG/DL (ref 0.61–1.12)
TRIGL SERPL-MCNC: 86 MG/DL
TSH SERPL DL<=0.05 MIU/L-ACNC: 1.89 UIU/ML (ref 0.45–4.5)
URATE SERPL-MCNC: 6 MG/DL (ref 2–7.5)
WBC # BLD AUTO: 6.51 THOUSAND/UL (ref 4.31–10.16)
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
## 2023-05-22 NOTE — ASSESSMENT & PLAN NOTE
Reports increased pain in b/l hand, shoulder, and knees  Following with rheumatology, taking Azulfidine which she does not believe is working well for her  She is taking 1000mg BID

## 2023-05-23 ENCOUNTER — TELEPHONE (OUTPATIENT)
Dept: FAMILY MEDICINE CLINIC | Facility: CLINIC | Age: 68
End: 2023-05-23

## 2023-05-23 ENCOUNTER — OFFICE VISIT (OUTPATIENT)
Dept: RHEUMATOLOGY | Facility: CLINIC | Age: 68
End: 2023-05-23

## 2023-05-23 VITALS
DIASTOLIC BLOOD PRESSURE: 98 MMHG | WEIGHT: 177.6 LBS | HEART RATE: 78 BPM | BODY MASS INDEX: 28.67 KG/M2 | SYSTOLIC BLOOD PRESSURE: 142 MMHG

## 2023-05-23 DIAGNOSIS — M25.50 POLYARTHRALGIA: ICD-10-CM

## 2023-05-23 DIAGNOSIS — Z79.899 HIGH RISK MEDICATION USE: ICD-10-CM

## 2023-05-23 DIAGNOSIS — R76.8 POSITIVE DOUBLE STRANDED DNA ANTIBODY TEST: ICD-10-CM

## 2023-05-23 DIAGNOSIS — M06.00 SERONEGATIVE RHEUMATOID ARTHRITIS (HCC): Primary | ICD-10-CM

## 2023-05-23 DIAGNOSIS — M15.9 PRIMARY OSTEOARTHRITIS INVOLVING MULTIPLE JOINTS: ICD-10-CM

## 2023-05-23 RX ORDER — METHOTREXATE 2.5 MG/1
10 TABLET ORAL WEEKLY
Qty: 16 TABLET | Refills: 3 | Status: SHIPPED | OUTPATIENT
Start: 2023-05-23 | End: 2023-06-22

## 2023-05-23 RX ORDER — FOLIC ACID 1 MG/1
1 TABLET ORAL DAILY
Qty: 30 TABLET | Refills: 3 | Status: SHIPPED | OUTPATIENT
Start: 2023-05-23 | End: 2023-06-22

## 2023-05-23 RX ORDER — PREDNISONE 5 MG/1
TABLET ORAL
Qty: 70 TABLET | Refills: 0 | Status: SHIPPED | OUTPATIENT
Start: 2023-05-23

## 2023-05-23 NOTE — TELEPHONE ENCOUNTER
Yes, good afternoon  My name is Michael Dang  My number is [phone] and I'm calling to speak with the Miss Ruiz Carrie  I want to speak with her about my today visit with my rheumatologist and please call me at [phone]  Thank you

## 2023-05-23 NOTE — TELEPHONE ENCOUNTER
Pt was prescribed methotrexate, prednisone, and folic acid from rheumatologist  She wants to confirm with you that it is ok to take these medications
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
## 2023-05-23 NOTE — PATIENT INSTRUCTIONS
Start prednisone taper  Start methotrexate 4 tabs all at once ONE day a week  Take folic acid DAILY (at opposite time of day as the methotrexate)     Get labs in 1 month

## 2023-05-24 LAB — ACTH PLAS-MCNC: 5.5 PG/ML (ref 7.2–63.3)

## 2023-05-25 LAB — DHEA SERPL-MCNC: 39 NG/DL (ref 21–402)

## 2023-05-29 DIAGNOSIS — D35.01 ADRENAL ADENOMA, RIGHT: Primary | ICD-10-CM

## 2023-06-01 ENCOUNTER — APPOINTMENT (OUTPATIENT)
Dept: LAB | Facility: HOSPITAL | Age: 68
End: 2023-06-01

## 2023-06-05 ENCOUNTER — APPOINTMENT (OUTPATIENT)
Dept: LAB | Facility: HOSPITAL | Age: 68
End: 2023-06-05
Payer: MEDICARE

## 2023-06-05 DIAGNOSIS — D35.01 ADRENAL ADENOMA, RIGHT: ICD-10-CM

## 2023-06-05 PROCEDURE — 82384 ASSAY THREE CATECHOLAMINES: CPT

## 2023-06-05 PROCEDURE — 82530 CORTISOL FREE: CPT

## 2023-06-08 LAB
DOPAMINE 24H UR-MRATE: 247 UG/24 HR (ref 0–510)
DOPAMINE UR-MCNC: 137 UG/L
EPINEPH 24H UR-MRATE: 4 UG/24 HR (ref 0–20)
EPINEPH UR-MCNC: 2 UG/L
NOREPINEPH 24H UR-MRATE: 43 UG/24 HR (ref 0–135)
NOREPINEPH UR-MCNC: 24 UG/L

## 2023-06-09 DIAGNOSIS — D35.01 ADRENAL ADENOMA, RIGHT: Primary | ICD-10-CM

## 2023-06-09 DIAGNOSIS — R82.998 LOW URINARY CORTISOL: ICD-10-CM

## 2023-06-09 LAB
CORTIS F 24H UR-MRATE: 4 UG/24 HR (ref 6–42)
CORTIS F UR-MCNC: 2 UG/L

## 2023-06-12 DIAGNOSIS — R79.89 ELEVATED SERUM FREE T4 LEVEL: Primary | ICD-10-CM

## 2023-06-12 DIAGNOSIS — E03.9 ACQUIRED HYPOTHYROIDISM: ICD-10-CM

## 2023-06-22 ENCOUNTER — TELEPHONE (OUTPATIENT)
Dept: FAMILY MEDICINE CLINIC | Facility: CLINIC | Age: 68
End: 2023-06-22

## 2023-06-22 NOTE — TELEPHONE ENCOUNTER
Pt was advised she should not do the cortisol testing until she is done her prednisone rx  She just finished yesterday and would like to know how long she should wait before going for her lab testing  Please advise

## 2023-06-24 ENCOUNTER — APPOINTMENT (OUTPATIENT)
Dept: LAB | Facility: HOSPITAL | Age: 68
End: 2023-06-24
Payer: MEDICARE

## 2023-06-24 DIAGNOSIS — R79.89 ELEVATED SERUM FREE T4 LEVEL: ICD-10-CM

## 2023-06-24 DIAGNOSIS — R82.998 LOW URINARY CORTISOL: ICD-10-CM

## 2023-06-24 DIAGNOSIS — E03.9 ACQUIRED HYPOTHYROIDISM: ICD-10-CM

## 2023-06-24 DIAGNOSIS — Z79.899 HIGH RISK MEDICATION USE: ICD-10-CM

## 2023-06-24 DIAGNOSIS — D35.01 ADRENAL ADENOMA, RIGHT: ICD-10-CM

## 2023-06-24 DIAGNOSIS — M06.00 SERONEGATIVE RHEUMATOID ARTHRITIS (HCC): ICD-10-CM

## 2023-06-24 LAB
ALBUMIN SERPL BCP-MCNC: 3.9 G/DL (ref 3.5–5)
ALP SERPL-CCNC: 46 U/L (ref 34–104)
ALT SERPL W P-5'-P-CCNC: 35 U/L (ref 7–52)
ANION GAP SERPL CALCULATED.3IONS-SCNC: 5 MMOL/L
AST SERPL W P-5'-P-CCNC: 23 U/L (ref 13–39)
BASOPHILS # BLD AUTO: 0.04 THOUSANDS/ÂΜL (ref 0–0.1)
BASOPHILS NFR BLD AUTO: 1 % (ref 0–1)
BILIRUB SERPL-MCNC: 0.54 MG/DL (ref 0.2–1)
BUN SERPL-MCNC: 35 MG/DL (ref 5–25)
CALCIUM SERPL-MCNC: 9.2 MG/DL (ref 8.4–10.2)
CHLORIDE SERPL-SCNC: 108 MMOL/L (ref 96–108)
CO2 SERPL-SCNC: 27 MMOL/L (ref 21–32)
CREAT SERPL-MCNC: 0.79 MG/DL (ref 0.6–1.3)
CRP SERPL QL: 4.1 MG/L
EOSINOPHIL # BLD AUTO: 0.15 THOUSAND/ÂΜL (ref 0–0.61)
EOSINOPHIL NFR BLD AUTO: 3 % (ref 0–6)
ERYTHROCYTE [DISTWIDTH] IN BLOOD BY AUTOMATED COUNT: 16.6 % (ref 11.6–15.1)
ERYTHROCYTE [SEDIMENTATION RATE] IN BLOOD: 18 MM/HOUR (ref 0–29)
GFR SERPL CREATININE-BSD FRML MDRD: 77 ML/MIN/1.73SQ M
GLUCOSE P FAST SERPL-MCNC: 91 MG/DL (ref 65–99)
HCT VFR BLD AUTO: 36.3 % (ref 34.8–46.1)
HGB BLD-MCNC: 11.7 G/DL (ref 11.5–15.4)
IMM GRANULOCYTES # BLD AUTO: 0.02 THOUSAND/UL (ref 0–0.2)
IMM GRANULOCYTES NFR BLD AUTO: 0 % (ref 0–2)
LYMPHOCYTES # BLD AUTO: 1.13 THOUSANDS/ÂΜL (ref 0.6–4.47)
LYMPHOCYTES NFR BLD AUTO: 22 % (ref 14–44)
MCH RBC QN AUTO: 28.9 PG (ref 26.8–34.3)
MCHC RBC AUTO-ENTMCNC: 32.2 G/DL (ref 31.4–37.4)
MCV RBC AUTO: 90 FL (ref 82–98)
MONOCYTES # BLD AUTO: 0.44 THOUSAND/ÂΜL (ref 0.17–1.22)
MONOCYTES NFR BLD AUTO: 9 % (ref 4–12)
NEUTROPHILS # BLD AUTO: 3.35 THOUSANDS/ÂΜL (ref 1.85–7.62)
NEUTS SEG NFR BLD AUTO: 65 % (ref 43–75)
NRBC BLD AUTO-RTO: 0 /100 WBCS
PLATELET # BLD AUTO: 321 THOUSANDS/UL (ref 149–390)
PMV BLD AUTO: 10.1 FL (ref 8.9–12.7)
POTASSIUM SERPL-SCNC: 4.2 MMOL/L (ref 3.5–5.3)
PROT SERPL-MCNC: 7.3 G/DL (ref 6.4–8.4)
RBC # BLD AUTO: 4.05 MILLION/UL (ref 3.81–5.12)
SODIUM SERPL-SCNC: 140 MMOL/L (ref 135–147)
TSH SERPL DL<=0.05 MIU/L-ACNC: 2.36 UIU/ML (ref 0.45–4.5)
WBC # BLD AUTO: 5.13 THOUSAND/UL (ref 4.31–10.16)

## 2023-06-24 PROCEDURE — 84439 ASSAY OF FREE THYROXINE: CPT

## 2023-06-24 PROCEDURE — 80053 COMPREHEN METABOLIC PANEL: CPT

## 2023-06-24 PROCEDURE — 84443 ASSAY THYROID STIM HORMONE: CPT

## 2023-06-24 PROCEDURE — 85025 COMPLETE CBC W/AUTO DIFF WBC: CPT

## 2023-06-24 PROCEDURE — 85652 RBC SED RATE AUTOMATED: CPT

## 2023-06-24 PROCEDURE — 36415 COLL VENOUS BLD VENIPUNCTURE: CPT

## 2023-06-24 PROCEDURE — 86140 C-REACTIVE PROTEIN: CPT

## 2023-06-25 LAB — T4 FREE SERPL-MCNC: 0.92 NG/DL (ref 0.61–1.12)

## 2023-06-26 DIAGNOSIS — E27.49 HYPOCORTISOLISM (HCC): Primary | ICD-10-CM

## 2023-06-26 NOTE — TELEPHONE ENCOUNTER
6/26/2023 spoke with pt and test schedule for 6/29/2023  8:00 @ [address] [facility] pt notified

## 2023-06-27 ENCOUNTER — TELEPHONE (OUTPATIENT)
Dept: INFUSION CENTER | Facility: CLINIC | Age: 68
End: 2023-06-27

## 2023-06-27 NOTE — TELEPHONE ENCOUNTER
NEW PATIENT PHONE CALL  Went over procedure with her and what to expect  She is aware to not eat or drink anything before or during test   She is aware that she will be getting an IV  Briefly went over infusion center guidelines  All questions answered

## 2023-06-29 ENCOUNTER — HOSPITAL ENCOUNTER (OUTPATIENT)
Dept: INFUSION CENTER | Facility: CLINIC | Age: 68
Discharge: HOME | End: 2023-06-29
Payer: MEDICARE

## 2023-06-29 VITALS
HEART RATE: 65 BPM | SYSTOLIC BLOOD PRESSURE: 122 MMHG | DIASTOLIC BLOOD PRESSURE: 58 MMHG | RESPIRATION RATE: 17 BRPM | TEMPERATURE: 96.8 F

## 2023-06-29 DIAGNOSIS — E27.49 HYPOCORTISOLISM (HCC): Primary | ICD-10-CM

## 2023-06-29 LAB
CORTIS SERPL-MCNC: 24.1 UG/DL
CORTIS SERPL-MCNC: 31 UG/DL
CORTIS SERPL-MCNC: 8.6 UG/DL

## 2023-06-29 PROCEDURE — 82533 TOTAL CORTISOL: CPT | Performed by: FAMILY MEDICINE

## 2023-06-29 PROCEDURE — 82024 ASSAY OF ACTH: CPT | Performed by: FAMILY MEDICINE

## 2023-06-29 RX ADMIN — SODIUM CHLORIDE 0.25 MG: 9 INJECTION INTRAMUSCULAR; INTRAVENOUS; SUBCUTANEOUS at 08:57

## 2023-06-29 NOTE — PROGRESS NOTES
Pt presents to infusion center for ACTH stimulation test. Pt offers no complaints, vitals stable. PIV inserted and baseline labs drawn. IV cosyntropin administered and cortisol stimulation test drawn at 30 min and 60 min post medication as per orders. Pt tolerated procedure without incident, ambulated out of infusion dept using cane with steady gait. No future infusion appts at this time.

## 2023-06-30 LAB — ACTH PLAS-MCNC: 10.2 PG/ML (ref 7.2–63.3)

## 2023-07-06 ENCOUNTER — OFFICE VISIT (OUTPATIENT)
Dept: FAMILY MEDICINE CLINIC | Facility: CLINIC | Age: 68
End: 2023-07-06
Payer: MEDICARE

## 2023-07-06 VITALS
WEIGHT: 188.2 LBS | HEART RATE: 68 BPM | HEIGHT: 66 IN | BODY MASS INDEX: 30.25 KG/M2 | DIASTOLIC BLOOD PRESSURE: 62 MMHG | TEMPERATURE: 97.9 F | OXYGEN SATURATION: 97 % | SYSTOLIC BLOOD PRESSURE: 124 MMHG

## 2023-07-06 DIAGNOSIS — M06.09 RHEUMATOID ARTHRITIS OF MULTIPLE SITES WITH NEGATIVE RHEUMATOID FACTOR (HCC): ICD-10-CM

## 2023-07-06 DIAGNOSIS — E03.9 ACQUIRED HYPOTHYROIDISM: ICD-10-CM

## 2023-07-06 DIAGNOSIS — E27.49 HYPOCORTISOLISM (HCC): ICD-10-CM

## 2023-07-06 DIAGNOSIS — I10 ESSENTIAL HYPERTENSION: ICD-10-CM

## 2023-07-06 DIAGNOSIS — M79.642 PAIN IN BOTH HANDS: Primary | ICD-10-CM

## 2023-07-06 DIAGNOSIS — M79.641 PAIN IN BOTH HANDS: Primary | ICD-10-CM

## 2023-07-06 DIAGNOSIS — E21.3 HYPERPARATHYROIDISM (HCC): ICD-10-CM

## 2023-07-06 PROCEDURE — 99214 OFFICE O/P EST MOD 30 MIN: CPT | Performed by: NURSE PRACTITIONER

## 2023-07-06 NOTE — ASSESSMENT & PLAN NOTE
Patient has a history of parathyroid gland removal one was removed per patient following with Dr. Larisa Mack will update her PTH

## 2023-07-06 NOTE — PROGRESS NOTES
Name: Sunshine Eaton      : 1955      MRN: [id number]  Encounter Provider: VALARIE Aldana  Encounter Date: 2023   Encounter department: [address] [place] [address]     1. Pain in both hands  Assessment & Plan:  Enlarged middle and distal PIP will check x-rays of her hands     Orders:  -     XR hand 3+ vw right; Future; Expected date: 2023  -     XR hand 3+ vw left; Future; Expected date: 2023    2. Rheumatoid arthritis of multiple sites with negative rheumatoid factor ([address])  Assessment & Plan:  Patient has been off the steroids for the past few weeks will recheck the sed and crp     Orders:  -     Sedimentation rate, automated; Future  -     C-reactive protein; Future    3. Hypocortisolism ([address])  Assessment & Plan:  The cortisol 24 hour urine was low but her cortisol stimulation test was normal       4. Essential hypertension  Assessment & Plan:  Well controlled BP       5. Hyperparathyroidism [facility])  Assessment & Plan:  Patient has a history of parathyroid gland removal one was removed per patient following with Dr. Gwen Foreman will update her PTH    Orders:  -     PTH, intact; Future    6. Acquired hypothyroidism  Assessment & Plan:  Not on thyroid medication TSH checked and was normal         BMI Counseling: Body mass index is 30.38 kg/m². The BMI is above normal. Nutrition recommendations include decreasing portion sizes, encouraging healthy choices of fruits and vegetables, decreasing fast food intake, consuming healthier snacks, limiting drinks that contain sugar, moderation in carbohydrate intake, increasing intake of lean protein, reducing intake of saturated and trans fat and reducing intake of cholesterol. Exercise recommendations include exercising 3-5 times per week and strength training exercises. No pharmacotherapy was ordered. Patient referred to PCP. Rationale for BMI follow-up plan is due to patient being overweight or obese.          Subjective Patient presents today for follow up. She reports that she is feel much better since her last visit. Today she presents and reports a rash on her lower extremities, primarily on her R leg. The rash is red and in patches and tends to intensify with hot weather. Review of Systems   Constitutional: Negative for chills and fever. HENT: Negative for congestion, ear pain, sinus pressure, sinus pain and sore throat. Eyes: Negative for pain and visual disturbance. Respiratory: Negative for cough and shortness of breath. Cardiovascular: Positive for leg swelling. Negative for chest pain and palpitations. B/l LE edema      Gastrointestinal: Negative for abdominal pain and vomiting. Endocrine: Negative. Genitourinary: Negative for dysuria and hematuria. Musculoskeletal: Negative for arthralgias and back pain. Skin: Positive for rash. Negative for color change. Allergic/Immunologic: Negative. Neurological: Negative for seizures and syncope. Hematological: Negative. Psychiatric/Behavioral: Negative. Negative for dysphoric mood and sleep disturbance. The patient is not nervous/anxious. All other systems reviewed and are negative.       Current Outpatient Medications on File Prior to Visit   Medication Sig   • B Complex Vitamins (B COMPLEX 1 PO) Take by mouth   • cholecalciferol (VITAMIN D3) 1,000 units tablet Take 1,000 Units by mouth daily   • Emollient (COLLAGEN EX) Apply topically   • famotidine (PEPCID) 20 mg tablet Take 1 tablet (20 mg total) by mouth 2 (two) times a day   • folic acid (FOLVITE) 1 mg tablet Take 1 tablet (1 mg total) by mouth daily At opposite time of day as the methotrexate   • gabapentin (NEURONTIN) 600 MG tablet TAKE 1 TABLET(600 MG) BY MOUTH TWICE DAILY   • hydrochlorothiazide (HYDRODIURIL) 25 mg tablet TAKE 1 TABLET(25 MG) BY MOUTH DAILY   • latanoprost (XALATAN) 0.005 % ophthalmic solution INSTILL 1 DROP IN BOTH EYES EVERY DAY AT BEDTIME   • levocetirizine (XYZAL) 5 MG tablet Take 5 mg by mouth daily   • meloxicam (Mobic) 7.5 mg tablet Take 1 tablet (7.5 mg total) by mouth 2 (two) times a day   • methotrexate 2.5 MG tablet Take 4 tablets (10 mg total) by mouth once a week All at once   • olmesartan (BENICAR) 40 mg tablet Take 1 tablet (40 mg total) by mouth daily   • Omega-3 Fatty Acids (FISH OIL PO) Take by mouth   • Turmeric (QC TUMERIC COMPLEX PO) Take by mouth   • fluticasone (FLONASE) 50 mcg/act nasal spray SPRAY 1 SQUIRT INTO THE NOSTRILS TWICE DAILY (Patient not taking: Reported on 7/6/2023)   • predniSONE 5 mg tablet Take 4 tabs daily for 7 days, then 3 tabs daily for 7 days, then 2 tabs daily for 7 days, then 1 tab daily for 7 days, then stop. (Patient not taking: Reported on 7/6/2023)   • sulfaSALAzine (AZULFIDINE) 500 mg tablet Take 2 tablets (1,000 mg total) by mouth 2 (two) times a day 1 tab once daily x 1 week, then 1 tab twice daily x 1 week, then 2 tabs twice daily after that. • triamcinolone (KENALOG) 0.1 % ointment Apply topically 2 (two) times a day Left lower leg until improved (Patient not taking: Reported on 5/22/2023)       Objective     /62 (BP Location: Left arm, Patient Position: Sitting)   Pulse 68   Temp 97.9 °F (36.6 °C) (Temporal)   Ht 5' 6" (1.676 m)   Wt 85.4 kg (188 lb 3.2 oz)   LMP  (LMP Unknown)   SpO2 97%   BMI 30.38 kg/m²     Physical Exam  Constitutional:       General: She is not in acute distress. Appearance: She is well-developed. HENT:      Head: Normocephalic and atraumatic. Right Ear: Tympanic membrane and ear canal normal.      Left Ear: Tympanic membrane and ear canal normal.      Nose: Nose normal.      Mouth/Throat:      Mouth: Mucous membranes are moist.   Eyes:      Pupils: Pupils are equal, round, and reactive to light. Neck:      Thyroid: No thyromegaly. Cardiovascular:      Rate and Rhythm: Normal rate and regular rhythm. Heart sounds: Normal heart sounds.  No murmur heard.  Pulmonary:      Effort: Pulmonary effort is normal. No respiratory distress. Breath sounds: Normal breath sounds. No wheezing. Abdominal:      General: Bowel sounds are normal.      Palpations: Abdomen is soft. Musculoskeletal:         General: Normal range of motion. Cervical back: Normal range of motion. Right lower le+ Pitting Edema present. Left lower le+ Pitting Edema present. Comments: Nodules middle and distal pip   Skin:     General: Skin is warm and dry. Neurological:      Mental Status: She is alert and oriented to person, place, and time.        VALARIE Viramontes

## 2023-07-07 ENCOUNTER — HOSPITAL ENCOUNTER (OUTPATIENT)
Dept: RADIOLOGY | Facility: HOSPITAL | Age: 68
End: 2023-07-07
Payer: MEDICARE

## 2023-07-07 ENCOUNTER — APPOINTMENT (OUTPATIENT)
Dept: LAB | Facility: HOSPITAL | Age: 68
End: 2023-07-07
Payer: MEDICARE

## 2023-07-07 DIAGNOSIS — E21.3 HYPERPARATHYROIDISM (HCC): ICD-10-CM

## 2023-07-07 DIAGNOSIS — R70.0 ELEVATED SED RATE: Primary | ICD-10-CM

## 2023-07-07 DIAGNOSIS — M06.09 RHEUMATOID ARTHRITIS OF MULTIPLE SITES WITH NEGATIVE RHEUMATOID FACTOR (HCC): ICD-10-CM

## 2023-07-07 DIAGNOSIS — M79.641 PAIN IN BOTH HANDS: ICD-10-CM

## 2023-07-07 DIAGNOSIS — M79.642 PAIN IN BOTH HANDS: ICD-10-CM

## 2023-07-07 LAB
CORTIS SERPL-MCNC: 6.7 UG/DL
CRP SERPL QL: 4.1 MG/L
ERYTHROCYTE [SEDIMENTATION RATE] IN BLOOD: 24 MM/HOUR (ref 0–29)
PTH-INTACT SERPL-MCNC: 41.9 PG/ML (ref 12–88)

## 2023-07-07 PROCEDURE — 82533 TOTAL CORTISOL: CPT

## 2023-07-07 PROCEDURE — 86140 C-REACTIVE PROTEIN: CPT

## 2023-07-07 PROCEDURE — 85652 RBC SED RATE AUTOMATED: CPT

## 2023-07-07 PROCEDURE — 83970 ASSAY OF PARATHORMONE: CPT

## 2023-07-07 PROCEDURE — 73130 X-RAY EXAM OF HAND: CPT

## 2023-07-07 PROCEDURE — 36415 COLL VENOUS BLD VENIPUNCTURE: CPT

## 2023-07-10 ENCOUNTER — ANNUAL EXAM (OUTPATIENT)
Dept: OBGYN CLINIC | Facility: CLINIC | Age: 68
End: 2023-07-10
Payer: MEDICARE

## 2023-07-10 VITALS
SYSTOLIC BLOOD PRESSURE: 132 MMHG | DIASTOLIC BLOOD PRESSURE: 70 MMHG | BODY MASS INDEX: 31.25 KG/M2 | WEIGHT: 187.6 LBS | HEIGHT: 65 IN

## 2023-07-10 DIAGNOSIS — Z01.419 ENCOUNTER FOR ROUTINE GYNECOLOGIC EXAMINATION IN MEDICARE PATIENT: Primary | ICD-10-CM

## 2023-07-10 DIAGNOSIS — Z12.31 ENCOUNTER FOR SCREENING MAMMOGRAM FOR BREAST CANCER: ICD-10-CM

## 2023-07-10 PROCEDURE — G0101 CA SCREEN;PELVIC/BREAST EXAM: HCPCS | Performed by: OBSTETRICS & GYNECOLOGY
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
## 2023-07-11 PROBLEM — Z01.419 ENCOUNTER FOR GYNECOLOGICAL EXAMINATION: Status: ACTIVE | Noted: 2023-07-11

## 2023-07-11 NOTE — ASSESSMENT & PLAN NOTE
Pap/HPV current  Mammogram ordered  Colonoscopy current    Encourage healthy diet, exercise, Calcium 1200mg per day and at least 800 iu Vitamin D daily.

## 2023-07-24 ENCOUNTER — OFFICE VISIT (OUTPATIENT)
Dept: RHEUMATOLOGY | Facility: CLINIC | Age: 68
End: 2023-07-24
Payer: MEDICARE

## 2023-07-24 VITALS
HEART RATE: 73 BPM | BODY MASS INDEX: 31.39 KG/M2 | SYSTOLIC BLOOD PRESSURE: 131 MMHG | DIASTOLIC BLOOD PRESSURE: 67 MMHG | WEIGHT: 188.4 LBS | HEIGHT: 65 IN

## 2023-07-24 DIAGNOSIS — R76.8 POSITIVE DOUBLE STRANDED DNA ANTIBODY TEST: ICD-10-CM

## 2023-07-24 DIAGNOSIS — Z79.899 HIGH RISK MEDICATION USE: ICD-10-CM

## 2023-07-24 DIAGNOSIS — M06.00 SERONEGATIVE RHEUMATOID ARTHRITIS (HCC): Primary | ICD-10-CM

## 2023-07-24 PROCEDURE — 99214 OFFICE O/P EST MOD 30 MIN: CPT | Performed by: PHYSICIAN ASSISTANT

## 2023-07-24 RX ORDER — METHOTREXATE 2.5 MG/1
10 TABLET ORAL WEEKLY
Qty: 48 TABLET | Refills: 1 | Status: SHIPPED | OUTPATIENT
Start: 2023-07-24 | End: 2023-10-22

## 2023-07-24 RX ORDER — FOLIC ACID 1 MG/1
1 TABLET ORAL DAILY
Qty: 90 TABLET | Refills: 1 | Status: SHIPPED | OUTPATIENT
Start: 2023-07-24 | End: 2023-10-22
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
## 2023-07-31 DIAGNOSIS — I87.322 STASIS DERMATITIS OF LEFT LOWER EXTREMITY DUE TO PERIPHERAL VENOUS HYPERTENSION: ICD-10-CM

## 2023-08-04 DIAGNOSIS — M25.50 POLYARTHRALGIA: ICD-10-CM

## 2023-08-04 DIAGNOSIS — M06.00 SERONEGATIVE RHEUMATOID ARTHRITIS (HCC): ICD-10-CM

## 2023-08-04 RX ORDER — SULFASALAZINE 500 MG/1
1000 TABLET ORAL 2 TIMES DAILY
Qty: 360 TABLET | Refills: 0 | OUTPATIENT
Start: 2023-08-04 | End: 2023-11-02

## 2023-08-09 ENCOUNTER — TELEPHONE (OUTPATIENT)
Dept: RHEUMATOLOGY | Facility: CLINIC | Age: 68
End: 2023-08-09

## 2023-08-09 ENCOUNTER — TELEPHONE (OUTPATIENT)
Age: 68
End: 2023-08-09

## 2023-08-09 DIAGNOSIS — Z79.899 HIGH RISK MEDICATION USE: ICD-10-CM

## 2023-08-09 DIAGNOSIS — M06.00 SERONEGATIVE RHEUMATOID ARTHRITIS (HCC): ICD-10-CM

## 2023-08-09 RX ORDER — METHOTREXATE 2.5 MG/1
15 TABLET ORAL WEEKLY
Qty: 72 TABLET | Refills: 1 | Status: SHIPPED | OUTPATIENT
Start: 2023-08-09 | End: 2023-11-07

## 2023-08-09 RX ORDER — PREDNISONE 5 MG/1
TABLET ORAL
Qty: 21 TABLET | Refills: 0 | Status: SHIPPED | OUTPATIENT
Start: 2023-08-09 | End: 2023-08-22

## 2023-08-09 NOTE — TELEPHONE ENCOUNTER
Caller: Patient    Doctor: Kj Oswald PA-C    Reason for call: Patient calling stating that she is having flare-up and not feeling well. She states that at the time of the last appt it was discussed with Claire Ma that they could possibly increase medication.   Patient asking to speak to clinical team.    Call back#: [phone]

## 2023-08-09 NOTE — TELEPHONE ENCOUNTER
Patient was called and her  was spoken with. He was told to have her increase her methotrexate to 6 tablets weekly and that a prescription was called into the pharmacy to help with her flare up. Understanding was verbalized.

## 2023-08-28 ENCOUNTER — TELEPHONE (OUTPATIENT)
Dept: ADMINISTRATIVE | Facility: OTHER | Age: 68
End: 2023-08-28

## 2023-08-28 NOTE — TELEPHONE ENCOUNTER
Upon review of the In Basket request we were able to note that no further action is required. The patient chart is up to date as a result of a previous request.      Any additional questions or concerns should be emailed to the Practice Liaisons via the appropriate education email address, please do not reply via In Basket.     Thank you  Raisa Be MA

## 2023-08-28 NOTE — TELEPHONE ENCOUNTER
----- Message from Ryan Meza MA sent at 8/25/2023  2:10 PM EDT -----  Regarding: Care Gap Request  08/25/23 2:10 PM    Hello, our patient Lincoln Contreras has had Mammogram completed/performed. Please assist in updating the patient chart by pulling the Care Everywhere (CE) document. The date of service is 8/23/2023.      Thank you,  Trina ELIZONDO

## 2023-09-05 ENCOUNTER — OFFICE VISIT (OUTPATIENT)
Dept: FAMILY MEDICINE CLINIC | Facility: CLINIC | Age: 68
End: 2023-09-05
Payer: MEDICARE

## 2023-09-05 VITALS
OXYGEN SATURATION: 97 % | WEIGHT: 188 LBS | DIASTOLIC BLOOD PRESSURE: 92 MMHG | TEMPERATURE: 97.9 F | HEART RATE: 71 BPM | BODY MASS INDEX: 31.32 KG/M2 | HEIGHT: 65 IN | SYSTOLIC BLOOD PRESSURE: 154 MMHG

## 2023-09-05 DIAGNOSIS — I89.0 LYMPHEDEMA OF BOTH LOWER EXTREMITIES: Primary | ICD-10-CM

## 2023-09-05 DIAGNOSIS — M06.09 RHEUMATOID ARTHRITIS OF MULTIPLE SITES WITH NEGATIVE RHEUMATOID FACTOR (HCC): ICD-10-CM

## 2023-09-05 PROCEDURE — 99214 OFFICE O/P EST MOD 30 MIN: CPT
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
## 2023-09-06 ENCOUNTER — TELEPHONE (OUTPATIENT)
Dept: FAMILY MEDICINE CLINIC | Facility: CLINIC | Age: 68
End: 2023-09-06

## 2023-09-06 DIAGNOSIS — E78.2 MIXED HYPERLIPIDEMIA: Primary | ICD-10-CM

## 2023-09-06 RX ORDER — ROSUVASTATIN CALCIUM 20 MG/1
20 TABLET, COATED ORAL DAILY
Qty: 90 TABLET | Refills: 3 | Status: SHIPPED | OUTPATIENT
Start: 2023-09-06 | End: 2023-09-07 | Stop reason: SDUPTHER

## 2023-09-07 DIAGNOSIS — E78.2 MIXED HYPERLIPIDEMIA: ICD-10-CM

## 2023-09-07 RX ORDER — ROSUVASTATIN CALCIUM 20 MG/1
20 TABLET, COATED ORAL DAILY
Qty: 90 TABLET | Refills: 3 | Status: SHIPPED | OUTPATIENT
Start: 2023-09-07

## 2023-09-09 PROBLEM — Z01.419 ENCOUNTER FOR GYNECOLOGICAL EXAMINATION: Status: RESOLVED | Noted: 2023-07-11 | Resolved: 2023-09-09

## 2023-09-14 ENCOUNTER — APPOINTMENT (OUTPATIENT)
Dept: LAB | Facility: CLINIC | Age: 68
End: 2023-09-14
Payer: MEDICARE

## 2023-09-14 DIAGNOSIS — M06.00 SERONEGATIVE RHEUMATOID ARTHRITIS (HCC): ICD-10-CM

## 2023-09-14 DIAGNOSIS — Z79.899 HIGH RISK MEDICATION USE: ICD-10-CM

## 2023-09-14 LAB
ALBUMIN SERPL BCP-MCNC: 3.9 G/DL (ref 3.5–5)
ALP SERPL-CCNC: 59 U/L (ref 34–104)
ALT SERPL W P-5'-P-CCNC: 20 U/L (ref 7–52)
ANION GAP SERPL CALCULATED.3IONS-SCNC: 11 MMOL/L
AST SERPL W P-5'-P-CCNC: 22 U/L (ref 13–39)
BASOPHILS # BLD AUTO: 0.04 THOUSANDS/ÂΜL (ref 0–0.1)
BASOPHILS NFR BLD AUTO: 1 % (ref 0–1)
BILIRUB SERPL-MCNC: 0.35 MG/DL (ref 0.2–1)
BUN SERPL-MCNC: 26 MG/DL (ref 5–25)
CALCIUM SERPL-MCNC: 9.5 MG/DL (ref 8.4–10.2)
CHLORIDE SERPL-SCNC: 105 MMOL/L (ref 96–108)
CO2 SERPL-SCNC: 24 MMOL/L (ref 21–32)
CREAT SERPL-MCNC: 0.81 MG/DL (ref 0.6–1.3)
CRP SERPL QL: 5.6 MG/L
EOSINOPHIL # BLD AUTO: 0.18 THOUSAND/ÂΜL (ref 0–0.61)
EOSINOPHIL NFR BLD AUTO: 2 % (ref 0–6)
ERYTHROCYTE [DISTWIDTH] IN BLOOD BY AUTOMATED COUNT: 13.4 % (ref 11.6–15.1)
ERYTHROCYTE [SEDIMENTATION RATE] IN BLOOD: 33 MM/HOUR (ref 0–29)
GFR SERPL CREATININE-BSD FRML MDRD: 75 ML/MIN/1.73SQ M
GLUCOSE P FAST SERPL-MCNC: 96 MG/DL (ref 65–99)
HCT VFR BLD AUTO: 37 % (ref 34.8–46.1)
HGB BLD-MCNC: 12 G/DL (ref 11.5–15.4)
IMM GRANULOCYTES # BLD AUTO: 0.02 THOUSAND/UL (ref 0–0.2)
IMM GRANULOCYTES NFR BLD AUTO: 0 % (ref 0–2)
LYMPHOCYTES # BLD AUTO: 1 THOUSANDS/ÂΜL (ref 0.6–4.47)
LYMPHOCYTES NFR BLD AUTO: 13 % (ref 14–44)
MCH RBC QN AUTO: 29.1 PG (ref 26.8–34.3)
MCHC RBC AUTO-ENTMCNC: 32.4 G/DL (ref 31.4–37.4)
MCV RBC AUTO: 90 FL (ref 82–98)
MONOCYTES # BLD AUTO: 0.61 THOUSAND/ÂΜL (ref 0.17–1.22)
MONOCYTES NFR BLD AUTO: 8 % (ref 4–12)
NEUTROPHILS # BLD AUTO: 5.79 THOUSANDS/ÂΜL (ref 1.85–7.62)
NEUTS SEG NFR BLD AUTO: 76 % (ref 43–75)
NRBC BLD AUTO-RTO: 0 /100 WBCS
PLATELET # BLD AUTO: 400 THOUSANDS/UL (ref 149–390)
PMV BLD AUTO: 10.9 FL (ref 8.9–12.7)
POTASSIUM SERPL-SCNC: 4.1 MMOL/L (ref 3.5–5.3)
PROT SERPL-MCNC: 7.2 G/DL (ref 6.4–8.4)
RBC # BLD AUTO: 4.12 MILLION/UL (ref 3.81–5.12)
SODIUM SERPL-SCNC: 140 MMOL/L (ref 135–147)
WBC # BLD AUTO: 7.64 THOUSAND/UL (ref 4.31–10.16)

## 2023-09-14 PROCEDURE — 85652 RBC SED RATE AUTOMATED: CPT

## 2023-09-14 PROCEDURE — 36415 COLL VENOUS BLD VENIPUNCTURE: CPT

## 2023-09-14 PROCEDURE — 80053 COMPREHEN METABOLIC PANEL: CPT

## 2023-09-14 PROCEDURE — 85025 COMPLETE CBC W/AUTO DIFF WBC: CPT

## 2023-09-14 PROCEDURE — 86140 C-REACTIVE PROTEIN: CPT

## 2023-09-20 ENCOUNTER — HOSPITAL ENCOUNTER (OUTPATIENT)
Dept: CT IMAGING | Facility: CLINIC | Age: 68
Discharge: HOME | End: 2023-09-20
Payer: MEDICARE

## 2023-09-20 DIAGNOSIS — D35.01 ADRENAL ADENOMA, RIGHT: ICD-10-CM

## 2023-09-20 PROCEDURE — 74178 CT ABD&PLV WO CNTR FLWD CNTR: CPT

## 2023-09-20 PROCEDURE — G1004 CDSM NDSC: HCPCS

## 2023-09-20 RX ADMIN — IOHEXOL 100 ML: 350 INJECTION, SOLUTION INTRAVENOUS at 14:43

## 2023-10-03 ENCOUNTER — APPOINTMENT (OUTPATIENT)
Dept: LAB | Facility: CLINIC | Age: 68
End: 2023-10-03
Payer: MEDICARE

## 2023-10-03 LAB
CHOLEST SERPL-MCNC: 201 MG/DL
HDLC SERPL-MCNC: 51 MG/DL
LDLC SERPL CALC-MCNC: 129 MG/DL (ref 0–100)
NONHDLC SERPL-MCNC: 150 MG/DL
TRIGL SERPL-MCNC: 104 MG/DL

## 2023-10-04 ENCOUNTER — EVALUATION (OUTPATIENT)
Dept: PHYSICAL THERAPY | Facility: CLINIC | Age: 68
End: 2023-10-04
Payer: MEDICARE

## 2023-10-04 DIAGNOSIS — I89.0 LYMPHEDEMA OF BOTH LOWER EXTREMITIES: ICD-10-CM

## 2023-10-04 PROCEDURE — 97161 PT EVAL LOW COMPLEX 20 MIN: CPT | Performed by: PHYSICAL THERAPIST

## 2023-10-04 PROCEDURE — 97110 THERAPEUTIC EXERCISES: CPT | Performed by: PHYSICAL THERAPIST
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
## 2023-10-05 DIAGNOSIS — I10 ESSENTIAL HYPERTENSION: ICD-10-CM

## 2023-10-05 RX ORDER — HYDROCHLOROTHIAZIDE 25 MG/1
25 TABLET ORAL DAILY
Qty: 90 TABLET | Refills: 1 | Status: SHIPPED | OUTPATIENT
Start: 2023-10-05

## 2023-10-12 ENCOUNTER — OFFICE VISIT (OUTPATIENT)
Dept: PHYSICAL THERAPY | Facility: CLINIC | Age: 68
End: 2023-10-12
Payer: MEDICARE

## 2023-10-12 DIAGNOSIS — I89.0 LYMPHEDEMA OF BOTH LOWER EXTREMITIES: Primary | ICD-10-CM

## 2023-10-12 PROCEDURE — 97110 THERAPEUTIC EXERCISES: CPT

## 2023-10-12 PROCEDURE — 97140 MANUAL THERAPY 1/> REGIONS: CPT

## 2023-10-12 NOTE — PROGRESS NOTES
Daily Note     Today's date: 10/12/2023  Patient name: Cathie James  : 1955  MRN: [id number]  Referring provider: Luis Enrique Ceron MD  Dx:   Encounter Diagnosis     ICD-10-CM    1. Lymphedema of both lower extremities  I89.0                      Subjective: pt reports swelling and stiffness in BLE. Objective: See treatment diary below      Assessment: Tolerated treatment well. Patient would benefit from continued PT. Discussed lymphedema system, treatment options, precautions, compression, elevation, and exercise. Decreased stiffness and swelling post MLD. Plan: Continue per plan of care.        Daily Treatment Diary    EPOC: 23  Precautions: HTN- takes meds  Co- Morbidities:   Patient Active Problem List   Diagnosis    Essential hypertension    Mixed hyperlipidemia    Bilateral post-traumatic osteoarthritis of knee    Idiopathic peripheral neuropathy    Vitamin D deficiency    Overweight    Hypercalcemia    Hyperparathyroidism (HCC)    Arthritis    Bilateral hand pain    Chronic pain of both shoulders    Rheumatoid arthritis of multiple sites with negative rheumatoid factor (HCC)    Osteopenia of multiple sites    Chronic right-sided low back pain with right-sided sciatica    Acquired hypothyroidism    Pleuritic pain    Stasis dermatitis of left lower extremity due to peripheral venous hypertension    Hypocortisolism (HCC)    Pain in both hands    Lymphedema of both lower extremities         Manual  10/4  10/12                    MLD    30'                                                                                                                    Exercise Diary 10/4  10/12                   THEREX             Pt ed RB- HEP instruct/handout; education about compression, elevation 10'           Recumbent Bike                       HR/TR                       HS curls             Marches             Active HS stretch w/ ankle pumps             LAQ NEURO RE-ED             SLR flex/abd                       Minisquats                       Tb resisted hip strengthening                          TB resisted sidestepping             Tandem walking                                                    THERAPEUTIC ACTIVITY             Sts             Stair climbing                                                                Gait Training                       No AD when able                         Modalities

## 2023-10-13 ENCOUNTER — OFFICE VISIT (OUTPATIENT)
Dept: FAMILY MEDICINE CLINIC | Facility: CLINIC | Age: 68
End: 2023-10-13
Payer: MEDICARE

## 2023-10-13 VITALS
SYSTOLIC BLOOD PRESSURE: 132 MMHG | DIASTOLIC BLOOD PRESSURE: 84 MMHG | OXYGEN SATURATION: 97 % | HEART RATE: 69 BPM | TEMPERATURE: 97.8 F | BODY MASS INDEX: 30.39 KG/M2 | HEIGHT: 65 IN | WEIGHT: 182.4 LBS

## 2023-10-13 DIAGNOSIS — I89.0 LYMPHEDEMA OF BOTH LOWER EXTREMITIES: ICD-10-CM

## 2023-10-13 DIAGNOSIS — E78.2 MIXED HYPERLIPIDEMIA: ICD-10-CM

## 2023-10-13 DIAGNOSIS — E03.9 ACQUIRED HYPOTHYROIDISM: ICD-10-CM

## 2023-10-13 DIAGNOSIS — M06.09 RHEUMATOID ARTHRITIS OF MULTIPLE SITES WITH NEGATIVE RHEUMATOID FACTOR (HCC): ICD-10-CM

## 2023-10-13 DIAGNOSIS — I10 ESSENTIAL HYPERTENSION: Primary | ICD-10-CM

## 2023-10-13 DIAGNOSIS — E21.3 HYPERPARATHYROIDISM (HCC): ICD-10-CM

## 2023-10-13 PROBLEM — R07.81 PLEURITIC PAIN: Status: RESOLVED | Noted: 2023-04-06 | Resolved: 2023-10-13

## 2023-10-13 PROCEDURE — 99214 OFFICE O/P EST MOD 30 MIN: CPT | Performed by: NURSE PRACTITIONER

## 2023-10-13 NOTE — PROGRESS NOTES
Name: Joel Molina      : 1955      MRN: [id number]  Encounter Provider: VALARIE Mcqueen  Encounter Date: 10/13/2023   Encounter department: [address] [place] [address]     1. Essential hypertension  Assessment & Plan:  Benicar 40 mg HCTZ 25 mg well controlled     Orders:  -     Comprehensive metabolic panel; Future  -     Lipid Panel with Direct LDL reflex; Future    2. Acquired hypothyroidism  Assessment & Plan:  No current medications     Orders:  -     TSH, 3rd generation with Free T4 reflex; Future    3. Rheumatoid arthritis of multiple sites with negative rheumatoid factor (HCC)  Assessment & Plan:  Taking the 15 mg of Methotrexate     Orders:  -     Comprehensive metabolic panel; Future  -     CBC and differential; Future  -     Sedimentation rate, automated; Future  -     C-reactive protein; Future    4. Mixed hyperlipidemia  Assessment & Plan:  Crestor 20 mg daily     Orders:  -     Lipid Panel with Direct LDL reflex; Future    5. Lymphedema of both lower extremities  Assessment & Plan:  Just started with OT yesterday      6. Hyperparathyroidism ([address])  -     PTH, intact; Future           Subjective      Patient here today for follow up and reports that's he has started with physical therapy for her lymphedema. Patient reports that she is having some pains in her neck and arms and is following with rheumatology and had her medication increased of her methotrexate and about two months ago and has a follow up as well. Review of Systems   Constitutional:  Negative for activity change, appetite change, chills, diaphoresis, fatigue, fever and unexpected weight change. HENT:  Negative for congestion, ear pain, hearing loss, postnasal drip, sinus pressure, sinus pain, sneezing, sore throat and trouble swallowing. Eyes:  Negative for pain, redness and visual disturbance. Respiratory:  Negative for cough and shortness of breath.     Cardiovascular: Positive for leg swelling. Negative for chest pain. Gastrointestinal:  Negative for abdominal pain, diarrhea, nausea and vomiting. Endocrine: Negative. Genitourinary: Negative. Musculoskeletal:  Positive for arthralgias, myalgias and neck pain. Skin: Negative. Allergic/Immunologic: Negative. Neurological:  Negative for dizziness and light-headedness. Hematological: Negative. Psychiatric/Behavioral:  Negative for behavioral problems, dysphoric mood and sleep disturbance.         Current Outpatient Medications on File Prior to Visit   Medication Sig   • B Complex Vitamins (B COMPLEX 1 PO) Take by mouth   • cholecalciferol (VITAMIN D3) 1,000 units tablet Take 1,000 Units by mouth daily   • Emollient (COLLAGEN EX) Apply topically   • fluticasone (FLONASE) 50 mcg/act nasal spray    • folic acid (FOLVITE) 1 mg tablet Take 1 tablet (1 mg total) by mouth daily At opposite time of day as the methotrexate   • gabapentin (NEURONTIN) 600 MG tablet TAKE 1 TABLET(600 MG) BY MOUTH TWICE DAILY   • hydrochlorothiazide (HYDRODIURIL) 25 mg tablet TAKE ONE TABLET BY MOUTH DAILY   • latanoprost (XALATAN) 0.005 % ophthalmic solution INSTILL 1 DROP IN BOTH EYES EVERY DAY AT BEDTIME   • levocetirizine (XYZAL) 5 MG tablet Take 5 mg by mouth daily   • meloxicam (Mobic) 7.5 mg tablet Take 1 tablet (7.5 mg total) by mouth 2 (two) times a day   • methotrexate 2.5 MG tablet Take 6 tablets (15 mg total) by mouth once a week All at once   • olmesartan (BENICAR) 40 mg tablet Take 1 tablet (40 mg total) by mouth daily   • Omega-3 Fatty Acids (FISH OIL PO) Take by mouth   • rosuvastatin (CRESTOR) 20 MG tablet Take 1 tablet (20 mg total) by mouth daily   • triamcinolone (KENALOG) 0.1 % ointment APPLY TOPICALLY TWO (TWO) TIMES A DAY LEFT LOWER LEG UNTIL IMPROVED   • Turmeric (QC TUMERIC COMPLEX PO) Take by mouth   • [DISCONTINUED] famotidine (PEPCID) 20 mg tablet Take 1 tablet (20 mg total) by mouth 2 (two) times a day Objective     /84 (BP Location: Left arm, Patient Position: Sitting)   Pulse 69   Temp 97.8 °F (36.6 °C) (Temporal)   Ht 5' 5.25" (1.657 m)   Wt 82.7 kg (182 lb 6.4 oz)   LMP  (LMP Unknown)   SpO2 97%   BMI 30.12 kg/m²     Physical Exam  Vitals and nursing note reviewed. Constitutional:       General: She is not in acute distress. Appearance: She is well-developed. HENT:      Head: Normocephalic and atraumatic. Right Ear: Hearing and tympanic membrane normal.      Left Ear: Hearing and tympanic membrane normal.      Nose: Nose normal.      Mouth/Throat:      Lips: Pink. Mouth: Mucous membranes are moist.   Eyes:      General: Lids are normal.      Pupils: Pupils are equal, round, and reactive to light. Neck:      Thyroid: No thyromegaly. Cardiovascular:      Rate and Rhythm: Normal rate and regular rhythm. Heart sounds: Normal heart sounds. No murmur heard. Pulmonary:      Effort: Pulmonary effort is normal. No respiratory distress. Breath sounds: Normal breath sounds. No wheezing. Abdominal:      General: Bowel sounds are normal.      Palpations: Abdomen is soft. Musculoskeletal:         General: Normal range of motion. Cervical back: Normal range of motion. Right lower le+ Pitting Edema present. Left lower le+ Pitting Edema present. Comments: AM stiffness and tenderness in shoulders in the AM    Skin:     General: Skin is warm and dry. Neurological:      General: No focal deficit present. Mental Status: She is alert and oriented to person, place, and time. Psychiatric:         Attention and Perception: Attention normal.         Mood and Affect: Mood normal.         Speech: Speech normal.         Behavior: Behavior normal.         Thought Content:  Thought content normal.         Cognition and Memory: Cognition and memory normal.       Lane Retort

## 2023-10-17 ENCOUNTER — OFFICE VISIT (OUTPATIENT)
Dept: PHYSICAL THERAPY | Facility: CLINIC | Age: 68
End: 2023-10-17
Payer: MEDICARE

## 2023-10-17 DIAGNOSIS — I89.0 LYMPHEDEMA OF BOTH LOWER EXTREMITIES: Primary | ICD-10-CM

## 2023-10-17 PROCEDURE — 97140 MANUAL THERAPY 1/> REGIONS: CPT

## 2023-10-17 PROCEDURE — 97110 THERAPEUTIC EXERCISES: CPT

## 2023-10-17 NOTE — PROGRESS NOTES
Daily Note     Today's date: 10/17/2023  Patient name: Shelli Villalpando  : 1955  MRN: [id number]  Referring provider: Shalini Casiano MD  Dx:   Encounter Diagnosis     ICD-10-CM    1. Lymphedema of both lower extremities  I89.0                      Subjective: pt reports she felt good post previous session. Reports that she found some compression garments that go from ankle to mid thigh that seems to help a lot. Objective: See treatment diary below      Assessment: Tolerated treatment well. Patient would benefit from continued PT. Discussed causes of poor circulation and causing cold feet and management options. Decreased swelling and improve lymphatic flow post MLD. Pt has stage 2 lymphedema and was educated on compression, elevation, and exercise. Plan: Continue per plan of care.       Daily Treatment Diary    EPOC: 23  Precautions: HTN- takes meds  Co- Morbidities:   Patient Active Problem List   Diagnosis    Essential hypertension    Mixed hyperlipidemia    Bilateral post-traumatic osteoarthritis of knee    Idiopathic peripheral neuropathy    Vitamin D deficiency    Overweight    Hypercalcemia    Hyperparathyroidism (HCC)    Arthritis    Bilateral hand pain    Chronic pain of both shoulders    Rheumatoid arthritis of multiple sites with negative rheumatoid factor (HCC)    Osteopenia of multiple sites    Chronic right-sided low back pain with right-sided sciatica    Acquired hypothyroidism    Pleuritic pain    Stasis dermatitis of left lower extremity due to peripheral venous hypertension    Hypocortisolism (HCC)    Pain in both hands    Lymphedema of both lower extremities         Manual  10/4  10/12  10/17                  MLD    30'  30'                                                                                                                  Exercise Diary 10/4  10/12  10/17                 THEREX             Pt ed RB- HEP instruct/handout; education about compression, elevation 10' JH          Recumbent Bike                       HR/TR                       HS curls             Marches             Active HS stretch w/ ankle pumps             LAQ                                       NEURO RE-ED             SLR flex/abd                       Minisquats                       Tb resisted hip strengthening                          TB resisted sidestepping             Tandem walking                                                    THERAPEUTIC ACTIVITY             Sts             Stair climbing                                                                Gait Training                       No AD when able                         Modalities

## 2023-10-19 ENCOUNTER — OFFICE VISIT (OUTPATIENT)
Dept: PHYSICAL THERAPY | Facility: CLINIC | Age: 68
End: 2023-10-19
Payer: MEDICARE

## 2023-10-19 DIAGNOSIS — I89.0 LYMPHEDEMA OF BOTH LOWER EXTREMITIES: Primary | ICD-10-CM

## 2023-10-19 PROCEDURE — 97140 MANUAL THERAPY 1/> REGIONS: CPT

## 2023-10-19 NOTE — PROGRESS NOTES
Daily Note     Today's date: 10/19/2023  Patient name: Damion Rosenthal  : 1955  MRN: [id number]  Referring provider: Valencia Archibald MD  Dx:   Encounter Diagnosis     ICD-10-CM    1. Lymphedema of both lower extremities  I89.0                      Subjective: pt reports decreased swelling in BLE. Objective: See treatment diary below      Assessment: Tolerated treatment well. Patient would benefit from continued PT. Decreased swelling overall in BLE from MLD and wearing compression garments. Plan: Continue per plan of care.       Daily Treatment Diary    EPOC: 23  Precautions: HTN- takes meds  Co- Morbidities:   Patient Active Problem List   Diagnosis    Essential hypertension    Mixed hyperlipidemia    Bilateral post-traumatic osteoarthritis of knee    Idiopathic peripheral neuropathy    Vitamin D deficiency    Overweight    Hypercalcemia    Hyperparathyroidism (HCC)    Arthritis    Bilateral hand pain    Chronic pain of both shoulders    Rheumatoid arthritis of multiple sites with negative rheumatoid factor (HCC)    Osteopenia of multiple sites    Chronic right-sided low back pain with right-sided sciatica    Acquired hypothyroidism    Pleuritic pain    Stasis dermatitis of left lower extremity due to peripheral venous hypertension    Hypocortisolism (HCC)    Pain in both hands    Lymphedema of both lower extremities         Manual  10/4  10/12  10/17  10/19                MLD    30'  30'  43'                                                                                                                Exercise Diary 10/4  10/12  10/17  10/19               THEREX             Pt ed RB- HEP instruct/handout; education about compression, elevation 10' JH          Recumbent Bike                       HR/TR                       HS curls             Marches             Active HS stretch w/ ankle pumps             LAQ                                       NEURO RE-ED             SLR flex/abd Minisquats                       Tb resisted hip strengthening                          TB resisted sidestepping             Tandem walking                                                    THERAPEUTIC ACTIVITY             Sts             Stair climbing                                                                Gait Training                       No AD when able                         Modalities

## 2023-10-20 DIAGNOSIS — I10 ESSENTIAL HYPERTENSION: ICD-10-CM

## 2023-10-20 RX ORDER — OLMESARTAN MEDOXOMIL 40 MG/1
40 TABLET ORAL DAILY
Qty: 90 TABLET | Refills: 2 | Status: SHIPPED | OUTPATIENT
Start: 2023-10-20

## 2023-10-23 ENCOUNTER — APPOINTMENT (OUTPATIENT)
Dept: LAB | Facility: CLINIC | Age: 68
End: 2023-10-23
Payer: MEDICARE

## 2023-10-23 DIAGNOSIS — M06.00 SERONEGATIVE RHEUMATOID ARTHRITIS (HCC): ICD-10-CM

## 2023-10-23 DIAGNOSIS — Z79.899 HIGH RISK MEDICATION USE: ICD-10-CM

## 2023-10-23 LAB
ALBUMIN SERPL BCP-MCNC: 4 G/DL (ref 3.5–5)
ALP SERPL-CCNC: 60 U/L (ref 34–104)
ALT SERPL W P-5'-P-CCNC: 17 U/L (ref 7–52)
ANION GAP SERPL CALCULATED.3IONS-SCNC: 7 MMOL/L
AST SERPL W P-5'-P-CCNC: 20 U/L (ref 13–39)
BASOPHILS # BLD AUTO: 0.06 THOUSANDS/ÂΜL (ref 0–0.1)
BASOPHILS NFR BLD AUTO: 1 % (ref 0–1)
BILIRUB SERPL-MCNC: 0.51 MG/DL (ref 0.2–1)
BUN SERPL-MCNC: 26 MG/DL (ref 5–25)
CALCIUM SERPL-MCNC: 9.5 MG/DL (ref 8.4–10.2)
CHLORIDE SERPL-SCNC: 106 MMOL/L (ref 96–108)
CO2 SERPL-SCNC: 28 MMOL/L (ref 21–32)
CREAT SERPL-MCNC: 0.78 MG/DL (ref 0.6–1.3)
CRP SERPL QL: 1.6 MG/L
EOSINOPHIL # BLD AUTO: 0.25 THOUSAND/ÂΜL (ref 0–0.61)
EOSINOPHIL NFR BLD AUTO: 5 % (ref 0–6)
ERYTHROCYTE [DISTWIDTH] IN BLOOD BY AUTOMATED COUNT: 14.3 % (ref 11.6–15.1)
GFR SERPL CREATININE-BSD FRML MDRD: 78 ML/MIN/1.73SQ M
GLUCOSE P FAST SERPL-MCNC: 81 MG/DL (ref 65–99)
HCT VFR BLD AUTO: 38.4 % (ref 34.8–46.1)
HGB BLD-MCNC: 12.1 G/DL (ref 11.5–15.4)
IMM GRANULOCYTES # BLD AUTO: 0.02 THOUSAND/UL (ref 0–0.2)
IMM GRANULOCYTES NFR BLD AUTO: 0 % (ref 0–2)
LYMPHOCYTES # BLD AUTO: 1.27 THOUSANDS/ÂΜL (ref 0.6–4.47)
LYMPHOCYTES NFR BLD AUTO: 24 % (ref 14–44)
MCH RBC QN AUTO: 28.6 PG (ref 26.8–34.3)
MCHC RBC AUTO-ENTMCNC: 31.5 G/DL (ref 31.4–37.4)
MCV RBC AUTO: 91 FL (ref 82–98)
MONOCYTES # BLD AUTO: 0.45 THOUSAND/ÂΜL (ref 0.17–1.22)
MONOCYTES NFR BLD AUTO: 9 % (ref 4–12)
NEUTROPHILS # BLD AUTO: 3.27 THOUSANDS/ÂΜL (ref 1.85–7.62)
NEUTS SEG NFR BLD AUTO: 61 % (ref 43–75)
NRBC BLD AUTO-RTO: 0 /100 WBCS
PLATELET # BLD AUTO: 354 THOUSANDS/UL (ref 149–390)
PMV BLD AUTO: 11 FL (ref 8.9–12.7)
POTASSIUM SERPL-SCNC: 4.5 MMOL/L (ref 3.5–5.3)
PROT SERPL-MCNC: 7.6 G/DL (ref 6.4–8.4)
RBC # BLD AUTO: 4.23 MILLION/UL (ref 3.81–5.12)
SODIUM SERPL-SCNC: 141 MMOL/L (ref 135–147)
WBC # BLD AUTO: 5.32 THOUSAND/UL (ref 4.31–10.16)

## 2023-10-23 PROCEDURE — 85025 COMPLETE CBC W/AUTO DIFF WBC: CPT

## 2023-10-23 PROCEDURE — 80053 COMPREHEN METABOLIC PANEL: CPT

## 2023-10-23 PROCEDURE — 36415 COLL VENOUS BLD VENIPUNCTURE: CPT

## 2023-10-23 PROCEDURE — 86140 C-REACTIVE PROTEIN: CPT

## 2023-10-24 ENCOUNTER — OFFICE VISIT (OUTPATIENT)
Dept: PHYSICAL THERAPY | Facility: CLINIC | Age: 68
End: 2023-10-24
Payer: MEDICARE

## 2023-10-24 DIAGNOSIS — I89.0 LYMPHEDEMA OF BOTH LOWER EXTREMITIES: Primary | ICD-10-CM

## 2023-10-24 PROCEDURE — 97140 MANUAL THERAPY 1/> REGIONS: CPT

## 2023-10-24 PROCEDURE — 97110 THERAPEUTIC EXERCISES: CPT

## 2023-10-24 NOTE — PROGRESS NOTES
Daily Note     Today's date: 10/24/2023  Patient name: Shelli Villalpando  : 1955  MRN: [id number]  Referring provider: Celeste Dunn, *  Dx:   Encounter Diagnosis     ICD-10-CM    1. Lymphedema of both lower extremities  I89.0           Start Time: 930          Subjective: pt reports she was having a lot of pain in the legs yesterday but is feeling a little better today. Stated she thinks she had more pain yesterday because she didn't wear her compression yesterday. Objective: See treatment diary below      Assessment: Tolerated treatment well. Patient would benefit from continued PT. Performed MLD to improve lymphatic flow and decrease swelling. Educated on compression pump. Plan: Continue per plan of care.       Daily Treatment Diary    EPOC: 23  Precautions: HTN- takes meds  Co- Morbidities:   Patient Active Problem List   Diagnosis    Essential hypertension    Mixed hyperlipidemia    Bilateral post-traumatic osteoarthritis of knee    Idiopathic peripheral neuropathy    Vitamin D deficiency    Overweight    Hypercalcemia    Hyperparathyroidism (HCC)    Arthritis    Bilateral hand pain    Chronic pain of both shoulders    Rheumatoid arthritis of multiple sites with negative rheumatoid factor (HCC)    Osteopenia of multiple sites    Chronic right-sided low back pain with right-sided sciatica    Acquired hypothyroidism    Pleuritic pain    Stasis dermatitis of left lower extremity due to peripheral venous hypertension    Hypocortisolism (HCC)    Pain in both hands    Lymphedema of both lower extremities         Manual  10/4  10/12  10/17  10/19  10/24              MLD    30'  30'  43'  33'                                                                                                              Exercise Diary 10/4  10/12  10/17  10/19               THEREX             Pt ed RB- HEP instruct/handout; education about compression, elevation 10 uTest ISABEL uTest ISABEL         Recumbent Bike HR/TR                       HS curls             Marches             Active HS stretch w/ ankle pumps             LAQ                                       NEURO RE-ED             SLR flex/abd                       Minisquats                       Tb resisted hip strengthening                          TB resisted sidestepping             Tandem walking                                                    THERAPEUTIC ACTIVITY             Sts             Stair climbing                                                                Gait Training                       No AD when able                         Modalities

## 2023-10-26 ENCOUNTER — OFFICE VISIT (OUTPATIENT)
Dept: PHYSICAL THERAPY | Facility: CLINIC | Age: 68
End: 2023-10-26
Payer: MEDICARE

## 2023-10-26 DIAGNOSIS — I89.0 LYMPHEDEMA OF BOTH LOWER EXTREMITIES: Primary | ICD-10-CM

## 2023-10-26 PROCEDURE — 97140 MANUAL THERAPY 1/> REGIONS: CPT

## 2023-10-26 NOTE — PROGRESS NOTES
Daily Note     Today's date: 10/26/2023  Patient name: Lincoln Contreras  : 1955  MRN: [id number]  Referring provider: Rivera Hopson MD  Dx:   Encounter Diagnosis     ICD-10-CM    1. Lymphedema of both lower extremities  I89.0                      Subjective: pt reports continued swelling in BLE. Stated sometimes it can be worse in the morning. Reports she does feel better when wearing the compression garments. Objective: See treatment diary below      Assessment: Tolerated treatment well. Patient would benefit from continued PT. MLD to BLE improve lymphatic flow and decreased swelling and stiffness. Discussed that lymphedema is a chronic condition that needs to be managed by compression, elevation, and exercise. Plan: Continue per plan of care.       Daily Treatment Diary    EPOC: 23  Precautions: HTN- takes meds  Co- Morbidities:   Patient Active Problem List   Diagnosis    Essential hypertension    Mixed hyperlipidemia    Bilateral post-traumatic osteoarthritis of knee    Idiopathic peripheral neuropathy    Vitamin D deficiency    Overweight    Hypercalcemia    Hyperparathyroidism (HCC)    Arthritis    Bilateral hand pain    Chronic pain of both shoulders    Rheumatoid arthritis of multiple sites with negative rheumatoid factor (HCC)    Osteopenia of multiple sites    Chronic right-sided low back pain with right-sided sciatica    Acquired hypothyroidism    Pleuritic pain    Stasis dermatitis of left lower extremity due to peripheral venous hypertension    Hypocortisolism (HCC)    Pain in both hands    Lymphedema of both lower extremities         Manual  10/4  10/12  10/17  10/19  10/24  10/26            MLD    30'  30'  43'  33'  40'                                                                                                            Exercise Diary 10/4  10/12  10/17  10/19               THEREX             Pt ed RB- HEP instruct/handout; education about compression, elevation 10' [facility]         Recumbent Bike                       HR/TR                       HS curls             Marches             Active HS stretch w/ ankle pumps             LAQ                                       NEURO RE-ED             SLR flex/abd                       Minisquats                       Tb resisted hip strengthening                          TB resisted sidestepping             Tandem walking                                                    THERAPEUTIC ACTIVITY             Sts             Stair climbing                                                                Gait Training                       No AD when able                         Modalities

## 2023-10-28 DIAGNOSIS — M06.00 SERONEGATIVE RHEUMATOID ARTHRITIS (HCC): ICD-10-CM

## 2023-10-28 DIAGNOSIS — M25.50 POLYARTHRALGIA: ICD-10-CM

## 2023-10-28 RX ORDER — MELOXICAM 7.5 MG/1
TABLET ORAL
Qty: 180 TABLET | Refills: 3 | Status: SHIPPED | OUTPATIENT
Start: 2023-10-28

## 2023-10-30 ENCOUNTER — EVALUATION (OUTPATIENT)
Dept: PHYSICAL THERAPY | Facility: CLINIC | Age: 68
End: 2023-10-30
Payer: MEDICARE

## 2023-10-30 DIAGNOSIS — I89.0 LYMPHEDEMA OF BOTH LOWER EXTREMITIES: Primary | ICD-10-CM

## 2023-10-30 PROCEDURE — 97110 THERAPEUTIC EXERCISES: CPT

## 2023-10-30 PROCEDURE — 97140 MANUAL THERAPY 1/> REGIONS: CPT
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
## 2023-11-03 ENCOUNTER — OFFICE VISIT (OUTPATIENT)
Dept: PHYSICAL THERAPY | Facility: CLINIC | Age: 68
End: 2023-11-03
Payer: MEDICARE

## 2023-11-03 DIAGNOSIS — I89.0 LYMPHEDEMA OF BOTH LOWER EXTREMITIES: Primary | ICD-10-CM

## 2023-11-03 PROCEDURE — 97140 MANUAL THERAPY 1/> REGIONS: CPT

## 2023-11-03 NOTE — PROGRESS NOTES
Daily Note     Today's date: 11/3/2023  Patient name: Emmanuel Cohen  : 1955  MRN: [id number]  Referring provider: Alexandria Arredondo MD  Dx:   Encounter Diagnosis     ICD-10-CM    1. Lymphedema of both lower extremities  I89.0                      Subjective: pt reports she is feeling stronger overall. Objective: See treatment diary below      Assessment: Tolerated treatment well. Patient would benefit from continued PT. Performed MLD to decrease swelling and improve lymphatic flow. Plan: Continue per plan of care.       Daily Treatment Diary    EPOC: 23  Precautions: HTN- takes meds  Co- Morbidities:   Patient Active Problem List   Diagnosis    Essential hypertension    Mixed hyperlipidemia    Bilateral post-traumatic osteoarthritis of knee    Idiopathic peripheral neuropathy    Vitamin D deficiency    Overweight    Hypercalcemia    Hyperparathyroidism (HCC)    Arthritis    Bilateral hand pain    Chronic pain of both shoulders    Rheumatoid arthritis of multiple sites with negative rheumatoid factor (HCC)    Osteopenia of multiple sites    Chronic right-sided low back pain with right-sided sciatica    Acquired hypothyroidism    Pleuritic pain    Stasis dermatitis of left lower extremity due to peripheral venous hypertension    Hypocortisolism (HCC)    Pain in both hands    Lymphedema of both lower extremities         Manual  10/4  10/12  10/17  10/19  10/24  10/26  10/30  11/3        MLD    30'  30'  43'  33'  40' 30'   42'                                                                                                        Exercise Diary 10/4  10/12  10/17  10/19  10/30             THEREX             Pt ed RB- HEP instruct/handout; education about compression, elevation 10' [facility] FOTO; [employer]        Recumbent Bike                       HR/TR                       HS curls             Marches             Active HS stretch w/ ankle pumps             LAQ NEURO RE-ED             SLR flex/abd                       Minisquats                       Tb resisted hip strengthening                          TB resisted sidestepping             Tandem walking                                                    THERAPEUTIC ACTIVITY             Sts             Stair climbing                                                                Gait Training                       No AD when able                         Modalities

## 2023-11-07 ENCOUNTER — OFFICE VISIT (OUTPATIENT)
Dept: PHYSICAL THERAPY | Facility: CLINIC | Age: 68
End: 2023-11-07
Payer: MEDICARE

## 2023-11-07 DIAGNOSIS — I89.0 LYMPHEDEMA OF BOTH LOWER EXTREMITIES: Primary | ICD-10-CM

## 2023-11-07 PROCEDURE — 97140 MANUAL THERAPY 1/> REGIONS: CPT

## 2023-11-07 NOTE — PROGRESS NOTES
Daily Note     Today's date: 2023  Patient name: Reyes Hamilton  : 1955  MRN: [id number]  Referring provider: Ronna Mcgovern MD  Dx:   Encounter Diagnosis     ICD-10-CM    1. Lymphedema of both lower extremities  I89.0                      Subjective: pt reports no new complaints upon arrival.       Objective: See treatment diary below      Assessment: Tolerated treatment well. Patient would benefit from continued PT. MLD to improve lymphatic flow and decrease swelling. Plan: Continue per plan of care.       Daily Treatment Diary    EPOC: 23  Precautions: HTN- takes meds  Co- Morbidities:   Patient Active Problem List   Diagnosis    Essential hypertension    Mixed hyperlipidemia    Bilateral post-traumatic osteoarthritis of knee    Idiopathic peripheral neuropathy    Vitamin D deficiency    Overweight    Hypercalcemia    Hyperparathyroidism (HCC)    Arthritis    Bilateral hand pain    Chronic pain of both shoulders    Rheumatoid arthritis of multiple sites with negative rheumatoid factor (HCC)    Osteopenia of multiple sites    Chronic right-sided low back pain with right-sided sciatica    Acquired hypothyroidism    Pleuritic pain    Stasis dermatitis of left lower extremity due to peripheral venous hypertension    Hypocortisolism (HCC)    Pain in both hands    Lymphedema of both lower extremities         Manual  10/4  10/12  10/17  10/19  10/24  10/26  10/30  11/3  11/7      MLD    30'  30'  43'  33'  40' 30'   42'  42'                                                                                                      Exercise Diary 10/4  10/12  10/17  10/19  10/30             THEREX             Pt ed RB- HEP instruct/handout; education about compression, elevation 10 [facility] FOTO; Measuremnets        Recumbent Bike                       HR/TR                       HS curls             Marches             Active HS stretch w/ ankle pumps             LAQ NEURO RE-ED             SLR flex/abd                       Minisquats                       Tb resisted hip strengthening                          TB resisted sidestepping             Tandem walking                                                    THERAPEUTIC ACTIVITY             Sts             Stair climbing                                                                Gait Training                       No AD when able                         Modalities

## 2023-11-10 ENCOUNTER — OFFICE VISIT (OUTPATIENT)
Dept: PHYSICAL THERAPY | Facility: CLINIC | Age: 68
End: 2023-11-10
Payer: MEDICARE

## 2023-11-10 DIAGNOSIS — I89.0 LYMPHEDEMA OF BOTH LOWER EXTREMITIES: Primary | ICD-10-CM

## 2023-11-10 PROCEDURE — 97140 MANUAL THERAPY 1/> REGIONS: CPT

## 2023-11-10 NOTE — PROGRESS NOTES
Daily Note     Today's date: 11/10/2023  Patient name: Valerio Lebron  : 1955  MRN: [id number]  Referring provider: Maria C Ambrosio MD  Dx:   Encounter Diagnosis     ICD-10-CM    1. Lymphedema of both lower extremities  I89.0                      Subjective: pt reports having a lot of swelling last night in the legs. Objective: See treatment diary below      Assessment: Tolerated treatment well. Patient would benefit from continued PT. Educated pt on elevation, compression, and exercise. Pt is compliant w/ wearing her compression garments. Plan: Continue per plan of care.       Daily Treatment Diary    EPOC: 23  Precautions: HTN- takes meds  Co- Morbidities:   Patient Active Problem List   Diagnosis    Essential hypertension    Mixed hyperlipidemia    Bilateral post-traumatic osteoarthritis of knee    Idiopathic peripheral neuropathy    Vitamin D deficiency    Overweight    Hypercalcemia    Hyperparathyroidism (HCC)    Arthritis    Bilateral hand pain    Chronic pain of both shoulders    Rheumatoid arthritis of multiple sites with negative rheumatoid factor (HCC)    Osteopenia of multiple sites    Chronic right-sided low back pain with right-sided sciatica    Acquired hypothyroidism    Pleuritic pain    Stasis dermatitis of left lower extremity due to peripheral venous hypertension    Hypocortisolism (HCC)    Pain in both hands    Lymphedema of both lower extremities         Manual  10/4  10/12  10/17  10/19  10/24  10/26  10/30  11/3  11/7  11/10    MLD    30'  30'  43'  33'  40' 30'   42'  42'  42'                                                                                                    Exercise Diary 10/4  10/12  10/17  10/19  10/30             THEREX             Pt ed RB- HEP instruct/handout; education about compression, elevation 10 [facility] [address] [facility]; [facility]        Recumbent Bike                       HR/TR                       HS curls             [employer] Active HS stretch w/ ankle pumps             LAQ                                       NEURO RE-ED             SLR flex/abd                       Minisquats                       Tb resisted hip strengthening                          TB resisted sidestepping             Tandem walking                                                    THERAPEUTIC ACTIVITY             Sts             Stair climbing                                                                Gait Training                       No AD when able                         Modalities

## 2023-11-13 ENCOUNTER — OFFICE VISIT (OUTPATIENT)
Dept: PHYSICAL THERAPY | Facility: CLINIC | Age: 68
End: 2023-11-13
Payer: MEDICARE

## 2023-11-13 DIAGNOSIS — I89.0 LYMPHEDEMA OF BOTH LOWER EXTREMITIES: Primary | ICD-10-CM

## 2023-11-13 PROCEDURE — 97140 MANUAL THERAPY 1/> REGIONS: CPT

## 2023-11-13 NOTE — PROGRESS NOTES
Daily Note     Today's date: 2023  Patient name: Owen Green  : 1955  MRN: [id number]  Referring provider: Bianka Longoria MD  Dx:   Encounter Diagnosis     ICD-10-CM    1. Lymphedema of both lower extremities  I89.0                      Subjective: pt reports no new complaints upon arrival.       Objective: See treatment diary below      Assessment: Tolerated treatment well. Patient would benefit from continued PT. Performed MLD to improve lymphatic flow and decrease swelling. Plan: Continue per plan of care.       Daily Treatment Diary    EPOC: 23  Precautions: HTN- takes meds  Co- Morbidities:   Patient Active Problem List   Diagnosis    Essential hypertension    Mixed hyperlipidemia    Bilateral post-traumatic osteoarthritis of knee    Idiopathic peripheral neuropathy    Vitamin D deficiency    Overweight    Hypercalcemia    Hyperparathyroidism (HCC)    Arthritis    Bilateral hand pain    Chronic pain of both shoulders    Rheumatoid arthritis of multiple sites with negative rheumatoid factor (HCC)    Osteopenia of multiple sites    Chronic right-sided low back pain with right-sided sciatica    Acquired hypothyroidism    Pleuritic pain    Stasis dermatitis of left lower extremity due to peripheral venous hypertension    Hypocortisolism (HCC)    Pain in both hands    Lymphedema of both lower extremities         Manual 11/13     10/24  10/26  10/30  11/3  11/7  11/10    MLD 38'     33'  40' 30'   42'  42'  42'                                                                                    Exercise Diary 10/4  10/12  10/17  10/19  10/30             THEREX             Pt ed RB- HEP instruct/handout; education about compression, elevation 10 [facility] FOTO; [employer]        Recumbent Bike                       HR/TR                       HS curls             Marches             Active HS stretch w/ ankle pumps             LAQ                                       NEURO RE-ED SLR flex/abd                       Minisquats                       Tb resisted hip strengthening                          TB resisted sidestepping             Tandem walking                                                    THERAPEUTIC ACTIVITY             Sts             Stair climbing                                                                Gait Training                       No AD when able                         Modalities

## 2023-11-16 ENCOUNTER — OFFICE VISIT (OUTPATIENT)
Dept: PHYSICAL THERAPY | Facility: CLINIC | Age: 68
End: 2023-11-16
Payer: MEDICARE

## 2023-11-16 DIAGNOSIS — I89.0 LYMPHEDEMA OF BOTH LOWER EXTREMITIES: Primary | ICD-10-CM

## 2023-11-16 PROCEDURE — 97140 MANUAL THERAPY 1/> REGIONS: CPT

## 2023-11-16 NOTE — PROGRESS NOTES
Daily Note     Today's date: 2023  Patient name: Damion Rosenthal  : 1955  MRN: [id number]  Referring provider: Valencia Archibald MD  Dx:   Encounter Diagnosis     ICD-10-CM    1. Lymphedema of both lower extremities  I89.0                      Subjective: pt reports no new complaints. Objective: See treatment diary below      Assessment: Tolerated treatment well. Patient would benefit from continued PT. Plan: Continue per plan of care.       Daily Treatment Diary    EPOC: 23  Precautions: HTN- takes meds  Co- Morbidities:   Patient Active Problem List   Diagnosis    Essential hypertension    Mixed hyperlipidemia    Bilateral post-traumatic osteoarthritis of knee    Idiopathic peripheral neuropathy    Vitamin D deficiency    Overweight    Hypercalcemia    Hyperparathyroidism (HCC)    Arthritis    Bilateral hand pain    Chronic pain of both shoulders    Rheumatoid arthritis of multiple sites with negative rheumatoid factor (HCC)    Osteopenia of multiple sites    Chronic right-sided low back pain with right-sided sciatica    Acquired hypothyroidism    Pleuritic pain    Stasis dermatitis of left lower extremity due to peripheral venous hypertension    Hypocortisolism (HCC)    Pain in both hands    Lymphedema of both lower extremities         Manual 11/13 11/16    10/24  10/26  10/30  11/3  11/7  11/10    MLD 38' 38'    33'  40' 30'   42'  42'  42'                                                                                    Exercise Diary 10/4  10/12  10/17  10/19  10/30             THEREX             Pt ed RB- HEP instruct/handout; education about compression, elevation 10[facility] FOTO; [employer]mn[employer]        Recumbent Bike                       HR/TR                       HS curls             Marches             Active HS stretch w/ ankle pumps             LAQ                                       NEURO RE-ED             SLR flex/abd                       Minisquats Tb resisted hip strengthening                          TB resisted sidestepping             Tandem walking                                                    THERAPEUTIC ACTIVITY             Sts             Stair climbing                                                                Gait Training                       No AD when able                         Modalities

## 2023-11-20 ENCOUNTER — OFFICE VISIT (OUTPATIENT)
Dept: PHYSICAL THERAPY | Facility: CLINIC | Age: 68
End: 2023-11-20
Payer: MEDICARE

## 2023-11-20 DIAGNOSIS — I89.0 LYMPHEDEMA OF BOTH LOWER EXTREMITIES: Primary | ICD-10-CM

## 2023-11-20 PROCEDURE — 97140 MANUAL THERAPY 1/> REGIONS: CPT

## 2023-11-20 NOTE — PROGRESS NOTES
Daily Note     Today's date: 2023  Patient name: Rudi Sauceda  : 1955  MRN: [id number]  Referring provider: Nell Tong MD  Dx:   Encounter Diagnosis     ICD-10-CM    1. Lymphedema of both lower extremities  I89.0                      Subjective: pt reports no new complaints upon arrival.       Objective: See treatment diary below      Assessment: Tolerated treatment well. Patient would benefit from continued PT. Continued MLD to improve lymphatic flow and decrease swelling. Plan: Continue per plan of care.       Daily Treatment Diary    EPOC: 23  Precautions: HTN- takes meds  Co- Morbidities:   Patient Active Problem List   Diagnosis    Essential hypertension    Mixed hyperlipidemia    Bilateral post-traumatic osteoarthritis of knee    Idiopathic peripheral neuropathy    Vitamin D deficiency    Overweight    Hypercalcemia    Hyperparathyroidism (HCC)    Arthritis    Bilateral hand pain    Chronic pain of both shoulders    Rheumatoid arthritis of multiple sites with negative rheumatoid factor (HCC)    Osteopenia of multiple sites    Chronic right-sided low back pain with right-sided sciatica    Acquired hypothyroidism    Pleuritic pain    Stasis dermatitis of left lower extremity due to peripheral venous hypertension    Hypocortisolism (HCC)    Pain in both hands    Lymphedema of both lower extremities         Manual 11/13 11/16 11/20   10/24  10/26  10/30  11/3  11/7  11/10    MLD 38' 38' 40'   33'  40' 30'   42'  42'  42'                                                                                    Exercise Diary 10/4  10/12  10/17  10/19  10/30             THEREX             Pt ed RB- HEP instruct/handout; education about compression, elevation 10 [facility] ISABEL JH FOTO; Measuremnets        Recumbent Bike                       HR/TR                       HS curls             Marches             Active HS stretch w/ ankle pumps             LAQ NEURO RE-ED             SLR flex/abd                       Minisquats                       Tb resisted hip strengthening                          TB resisted sidestepping             Tandem walking                                                    THERAPEUTIC ACTIVITY             Sts             Stair climbing                                                                Gait Training                       No AD when able                         Modalities

## 2023-11-21 DIAGNOSIS — Z79.899 HIGH RISK MEDICATION USE: ICD-10-CM

## 2023-11-21 DIAGNOSIS — M06.00 SERONEGATIVE RHEUMATOID ARTHRITIS (HCC): ICD-10-CM

## 2023-11-21 RX ORDER — FOLIC ACID 1 MG/1
1 TABLET ORAL DAILY
Qty: 90 TABLET | Refills: 3 | Status: SHIPPED | OUTPATIENT
Start: 2023-11-21 | End: 2023-11-24 | Stop reason: SDUPTHER

## 2023-11-21 NOTE — TELEPHONE ENCOUNTER
Reason for call:   [x] Refill   [] Prior Auth  [] Other:     Office:   [] PCP/Provider -   [x] Specialty/Provider - Rheum    Medication: Folic Acid    Dose/Frequency: 1mf, Daily    Quantity: 90    Pharmacy:  [facility] - [address]     Does the patient have enough for 3 days? [x] Yes   [] No - Send as HP to POD      Patient stated pharmacy advise no refills.

## 2023-11-22 ENCOUNTER — OFFICE VISIT (OUTPATIENT)
Dept: PHYSICAL THERAPY | Facility: CLINIC | Age: 68
End: 2023-11-22
Payer: MEDICARE

## 2023-11-22 DIAGNOSIS — I89.0 LYMPHEDEMA OF BOTH LOWER EXTREMITIES: Primary | ICD-10-CM

## 2023-11-22 PROCEDURE — 97110 THERAPEUTIC EXERCISES: CPT
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
## 2023-11-24 ENCOUNTER — TELEPHONE (OUTPATIENT)
Dept: RHEUMATOLOGY | Facility: CLINIC | Age: 68
End: 2023-11-24

## 2023-11-24 DIAGNOSIS — M06.00 SERONEGATIVE RHEUMATOID ARTHRITIS (HCC): ICD-10-CM

## 2023-11-24 DIAGNOSIS — Z79.899 HIGH RISK MEDICATION USE: ICD-10-CM

## 2023-11-24 DIAGNOSIS — M25.50 POLYARTHRALGIA: ICD-10-CM

## 2023-11-24 RX ORDER — FOLIC ACID 1 MG/1
1 TABLET ORAL DAILY
Qty: 90 TABLET | Refills: 3 | Status: SHIPPED | OUTPATIENT
Start: 2023-11-24 | End: 2024-02-22

## 2023-11-24 RX ORDER — MELOXICAM 7.5 MG/1
TABLET ORAL
Qty: 180 TABLET | Refills: 0 | Status: SHIPPED | OUTPATIENT
Start: 2023-11-24

## 2023-11-24 NOTE — TELEPHONE ENCOUNTER
Patient called the Rx line. Called the pharmacy and they did not receive the script for the Folic Acid. Patient upset that she will be out of medication and won't have any to take on Sunday. Spoke with the pharmacy and they did not get it. Please resend to [facility].

## 2023-12-13 DIAGNOSIS — M06.00 SERONEGATIVE RHEUMATOID ARTHRITIS (HCC): ICD-10-CM

## 2023-12-13 DIAGNOSIS — M25.50 POLYARTHRALGIA: ICD-10-CM

## 2023-12-13 RX ORDER — MELOXICAM 7.5 MG/1
7.5 TABLET ORAL 2 TIMES DAILY
Qty: 180 TABLET | Refills: 1 | Status: SHIPPED | OUTPATIENT
Start: 2023-12-13 | End: 2024-03-12

## 2023-12-27 DIAGNOSIS — M06.00 SERONEGATIVE RHEUMATOID ARTHRITIS (HCC): ICD-10-CM

## 2023-12-27 DIAGNOSIS — Z79.899 HIGH RISK MEDICATION USE: ICD-10-CM

## 2023-12-27 DIAGNOSIS — G60.9 IDIOPATHIC PERIPHERAL NEUROPATHY: ICD-10-CM

## 2023-12-27 RX ORDER — GABAPENTIN 600 MG/1
600 TABLET ORAL 2 TIMES DAILY
Qty: 180 TABLET | Refills: 1 | Status: SHIPPED | OUTPATIENT
Start: 2023-12-27

## 2023-12-27 RX ORDER — METHOTREXATE 2.5 MG/1
15 TABLET ORAL WEEKLY
Qty: 72 TABLET | Refills: 1 | Status: SHIPPED | OUTPATIENT
Start: 2023-12-27 | End: 2024-03-26

## 2023-12-27 NOTE — TELEPHONE ENCOUNTER
Reason for call:   [x] Refill   [] Prior Auth  [] Other:     Office:   [] PCP/Provider -   [x] Specialty/Provider - Rheum    Medication: methotrexate 2.5 MG tablet     Pharmacy: [facility] pharmacy    Does the patient have enough for 3 days?   [] Yes   [x] No - Send as HP to POD

## 2024-01-09 ENCOUNTER — TELEPHONE (OUTPATIENT)
Age: 69
End: 2024-01-09

## 2024-01-09 ENCOUNTER — TELEPHONE (OUTPATIENT)
Dept: RHEUMATOLOGY | Facility: CLINIC | Age: 69
End: 2024-01-09

## 2024-01-09 DIAGNOSIS — Z79.899 HIGH RISK MEDICATION USE: ICD-10-CM

## 2024-01-09 DIAGNOSIS — M06.00 SERONEGATIVE RHEUMATOID ARTHRITIS (HCC): Primary | ICD-10-CM

## 2024-01-09 NOTE — TELEPHONE ENCOUNTER
Patient was called and told her labs were placed into her chart to complete prior to her next appointment.

## 2024-01-09 NOTE — TELEPHONE ENCOUNTER
Please let the patient know that I placed new active orders in the chart for her labs to be done any time prior to her follow up appt with me

## 2024-01-09 NOTE — TELEPHONE ENCOUNTER
Cary from [place] outpatient lab calling on behalf of pt. Per pt, she was supposed to have f/u labs completed. No lab orders found in the chart. Please review and f/u with pt regarding whether or not she needs labs done.

## 2024-01-11 ENCOUNTER — APPOINTMENT (OUTPATIENT)
Dept: LAB | Facility: HOSPITAL | Age: 69
End: 2024-01-11
Payer: MEDICARE

## 2024-01-11 LAB
ALBUMIN SERPL BCP-MCNC: 4.1 G/DL (ref 3.5–5)
ALP SERPL-CCNC: 52 U/L (ref 34–104)
ALT SERPL W P-5'-P-CCNC: 24 U/L (ref 7–52)
ANION GAP SERPL CALCULATED.3IONS-SCNC: 7 MMOL/L
AST SERPL W P-5'-P-CCNC: 27 U/L (ref 13–39)
BASOPHILS # BLD AUTO: 0.02 THOUSANDS/ÂΜL (ref 0–0.1)
BASOPHILS NFR BLD AUTO: 0 % (ref 0–1)
BILIRUB SERPL-MCNC: 0.5 MG/DL (ref 0.2–1)
BUN SERPL-MCNC: 25 MG/DL (ref 5–25)
CALCIUM SERPL-MCNC: 9.7 MG/DL (ref 8.4–10.2)
CHLORIDE SERPL-SCNC: 106 MMOL/L (ref 96–108)
CO2 SERPL-SCNC: 27 MMOL/L (ref 21–32)
CREAT SERPL-MCNC: 0.78 MG/DL (ref 0.6–1.3)
CRP SERPL QL: 3.2 MG/L
EOSINOPHIL # BLD AUTO: 0.18 THOUSAND/ÂΜL (ref 0–0.61)
EOSINOPHIL NFR BLD AUTO: 4 % (ref 0–6)
ERYTHROCYTE [DISTWIDTH] IN BLOOD BY AUTOMATED COUNT: 14.5 % (ref 11.6–15.1)
ERYTHROCYTE [SEDIMENTATION RATE] IN BLOOD: 24 MM/HOUR (ref 0–29)
GFR SERPL CREATININE-BSD FRML MDRD: 78 ML/MIN/1.73SQ M
GLUCOSE P FAST SERPL-MCNC: 82 MG/DL (ref 65–99)
HCT VFR BLD AUTO: 39.2 % (ref 34.8–46.1)
HGB BLD-MCNC: 12.8 G/DL (ref 11.5–15.4)
IMM GRANULOCYTES # BLD AUTO: 0.02 THOUSAND/UL (ref 0–0.2)
IMM GRANULOCYTES NFR BLD AUTO: 0 % (ref 0–2)
LYMPHOCYTES # BLD AUTO: 1.11 THOUSANDS/ÂΜL (ref 0.6–4.47)
LYMPHOCYTES NFR BLD AUTO: 22 % (ref 14–44)
MCH RBC QN AUTO: 29.4 PG (ref 26.8–34.3)
MCHC RBC AUTO-ENTMCNC: 32.7 G/DL (ref 31.4–37.4)
MCV RBC AUTO: 90 FL (ref 82–98)
MONOCYTES # BLD AUTO: 0.43 THOUSAND/ÂΜL (ref 0.17–1.22)
MONOCYTES NFR BLD AUTO: 8 % (ref 4–12)
NEUTROPHILS # BLD AUTO: 3.36 THOUSANDS/ÂΜL (ref 1.85–7.62)
NEUTS SEG NFR BLD AUTO: 66 % (ref 43–75)
NRBC BLD AUTO-RTO: 0 /100 WBCS
PLATELET # BLD AUTO: 328 THOUSANDS/UL (ref 149–390)
PMV BLD AUTO: 10.4 FL (ref 8.9–12.7)
POTASSIUM SERPL-SCNC: 4 MMOL/L (ref 3.5–5.3)
PROT SERPL-MCNC: 7.8 G/DL (ref 6.4–8.4)
RBC # BLD AUTO: 4.35 MILLION/UL (ref 3.81–5.12)
SODIUM SERPL-SCNC: 140 MMOL/L (ref 135–147)
WBC # BLD AUTO: 5.12 THOUSAND/UL (ref 4.31–10.16)

## 2024-01-11 PROCEDURE — 86140 C-REACTIVE PROTEIN: CPT | Performed by: PHYSICIAN ASSISTANT

## 2024-01-11 PROCEDURE — 85025 COMPLETE CBC W/AUTO DIFF WBC: CPT | Performed by: PHYSICIAN ASSISTANT

## 2024-01-11 PROCEDURE — 36415 COLL VENOUS BLD VENIPUNCTURE: CPT | Performed by: PHYSICIAN ASSISTANT

## 2024-01-11 PROCEDURE — 85652 RBC SED RATE AUTOMATED: CPT | Performed by: PHYSICIAN ASSISTANT

## 2024-01-11 PROCEDURE — 80053 COMPREHEN METABOLIC PANEL: CPT | Performed by: PHYSICIAN ASSISTANT

## 2024-01-24 ENCOUNTER — OFFICE VISIT (OUTPATIENT)
Dept: RHEUMATOLOGY | Facility: CLINIC | Age: 69
End: 2024-01-24
Payer: MEDICARE

## 2024-01-24 VITALS
WEIGHT: 189.4 LBS | HEIGHT: 65 IN | SYSTOLIC BLOOD PRESSURE: 148 MMHG | DIASTOLIC BLOOD PRESSURE: 86 MMHG | BODY MASS INDEX: 31.56 KG/M2

## 2024-01-24 DIAGNOSIS — M15.9 PRIMARY OSTEOARTHRITIS INVOLVING MULTIPLE JOINTS: ICD-10-CM

## 2024-01-24 DIAGNOSIS — Z79.899 HIGH RISK MEDICATION USE: ICD-10-CM

## 2024-01-24 DIAGNOSIS — M06.00 SERONEGATIVE RHEUMATOID ARTHRITIS (HCC): Primary | ICD-10-CM

## 2024-01-24 DIAGNOSIS — Z79.1 ENCOUNTER FOR LONG-TERM (CURRENT) USE OF NSAIDS: ICD-10-CM

## 2024-01-24 PROCEDURE — 99214 OFFICE O/P EST MOD 30 MIN: CPT | Performed by: PHYSICIAN ASSISTANT

## 2024-01-24 RX ORDER — AZELASTINE 1 MG/ML
SPRAY, METERED NASAL
COMMUNITY
Start: 2024-01-17
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
## 2024-02-02 ENCOUNTER — APPOINTMENT (OUTPATIENT)
Dept: LAB | Facility: HOSPITAL | Age: 69
End: 2024-02-02
Payer: MEDICARE

## 2024-02-02 DIAGNOSIS — E03.9 ACQUIRED HYPOTHYROIDISM: ICD-10-CM

## 2024-02-02 DIAGNOSIS — I10 ESSENTIAL HYPERTENSION: ICD-10-CM

## 2024-02-02 DIAGNOSIS — M06.09 RHEUMATOID ARTHRITIS OF MULTIPLE SITES WITH NEGATIVE RHEUMATOID FACTOR (HCC): ICD-10-CM

## 2024-02-02 DIAGNOSIS — E78.2 MIXED HYPERLIPIDEMIA: ICD-10-CM

## 2024-02-02 DIAGNOSIS — E21.3 HYPERPARATHYROIDISM (HCC): ICD-10-CM

## 2024-02-02 LAB
ALBUMIN SERPL BCP-MCNC: 4 G/DL (ref 3.5–5)
ALP SERPL-CCNC: 55 U/L (ref 34–104)
ALT SERPL W P-5'-P-CCNC: 16 U/L (ref 7–52)
ANION GAP SERPL CALCULATED.3IONS-SCNC: 6 MMOL/L
AST SERPL W P-5'-P-CCNC: 20 U/L (ref 13–39)
BASOPHILS # BLD AUTO: 0.02 THOUSANDS/ÂΜL (ref 0–0.1)
BASOPHILS NFR BLD AUTO: 1 % (ref 0–1)
BILIRUB SERPL-MCNC: 0.53 MG/DL (ref 0.2–1)
BUN SERPL-MCNC: 26 MG/DL (ref 5–25)
CALCIUM SERPL-MCNC: 9.5 MG/DL (ref 8.4–10.2)
CHLORIDE SERPL-SCNC: 106 MMOL/L (ref 96–108)
CHOLEST SERPL-MCNC: 197 MG/DL
CO2 SERPL-SCNC: 28 MMOL/L (ref 21–32)
CREAT SERPL-MCNC: 0.87 MG/DL (ref 0.6–1.3)
CRP SERPL QL: 6.6 MG/L
EOSINOPHIL # BLD AUTO: 0.18 THOUSAND/ÂΜL (ref 0–0.61)
EOSINOPHIL NFR BLD AUTO: 4 % (ref 0–6)
ERYTHROCYTE [DISTWIDTH] IN BLOOD BY AUTOMATED COUNT: 14.1 % (ref 11.6–15.1)
ERYTHROCYTE [SEDIMENTATION RATE] IN BLOOD: 29 MM/HOUR (ref 0–29)
GFR SERPL CREATININE-BSD FRML MDRD: 69 ML/MIN/1.73SQ M
GLUCOSE P FAST SERPL-MCNC: 89 MG/DL (ref 65–99)
HCT VFR BLD AUTO: 37.4 % (ref 34.8–46.1)
HDLC SERPL-MCNC: 47 MG/DL
HGB BLD-MCNC: 12.5 G/DL (ref 11.5–15.4)
IMM GRANULOCYTES # BLD AUTO: 0.01 THOUSAND/UL (ref 0–0.2)
IMM GRANULOCYTES NFR BLD AUTO: 0 % (ref 0–2)
LDLC SERPL CALC-MCNC: 123 MG/DL (ref 0–100)
LYMPHOCYTES # BLD AUTO: 0.89 THOUSANDS/ÂΜL (ref 0.6–4.47)
LYMPHOCYTES NFR BLD AUTO: 21 % (ref 14–44)
MCH RBC QN AUTO: 29.9 PG (ref 26.8–34.3)
MCHC RBC AUTO-ENTMCNC: 33.4 G/DL (ref 31.4–37.4)
MCV RBC AUTO: 90 FL (ref 82–98)
MONOCYTES # BLD AUTO: 0.3 THOUSAND/ÂΜL (ref 0.17–1.22)
MONOCYTES NFR BLD AUTO: 7 % (ref 4–12)
NEUTROPHILS # BLD AUTO: 2.91 THOUSANDS/ÂΜL (ref 1.85–7.62)
NEUTS SEG NFR BLD AUTO: 67 % (ref 43–75)
NRBC BLD AUTO-RTO: 0 /100 WBCS
PLATELET # BLD AUTO: 328 THOUSANDS/UL (ref 149–390)
PMV BLD AUTO: 9.8 FL (ref 8.9–12.7)
POTASSIUM SERPL-SCNC: 4 MMOL/L (ref 3.5–5.3)
PROT SERPL-MCNC: 7.2 G/DL (ref 6.4–8.4)
PTH-INTACT SERPL-MCNC: 38.2 PG/ML (ref 12–88)
RBC # BLD AUTO: 4.18 MILLION/UL (ref 3.81–5.12)
SODIUM SERPL-SCNC: 140 MMOL/L (ref 135–147)
TRIGL SERPL-MCNC: 137 MG/DL
TSH SERPL DL<=0.05 MIU/L-ACNC: 2.82 UIU/ML (ref 0.45–4.5)
WBC # BLD AUTO: 4.31 THOUSAND/UL (ref 4.31–10.16)

## 2024-02-02 PROCEDURE — 36415 COLL VENOUS BLD VENIPUNCTURE: CPT

## 2024-02-02 PROCEDURE — 80061 LIPID PANEL: CPT

## 2024-02-02 PROCEDURE — 80053 COMPREHEN METABOLIC PANEL: CPT

## 2024-02-02 PROCEDURE — 85025 COMPLETE CBC W/AUTO DIFF WBC: CPT

## 2024-02-02 PROCEDURE — 83970 ASSAY OF PARATHORMONE: CPT

## 2024-02-02 PROCEDURE — 86140 C-REACTIVE PROTEIN: CPT

## 2024-02-02 PROCEDURE — 85652 RBC SED RATE AUTOMATED: CPT

## 2024-02-02 PROCEDURE — 84443 ASSAY THYROID STIM HORMONE: CPT

## 2024-02-06 NOTE — PROGRESS NOTES
Patient called about the compound cream she requested. I informed her of the massage that Krunal Aponte sent though my chart on 02/05/24. Patient understood and has an upcoming appt with her PCP an will discuss with them at her OV.

## 2024-02-19 ENCOUNTER — RA CDI HCC (OUTPATIENT)
Dept: OTHER | Facility: HOSPITAL | Age: 69
End: 2024-02-19

## 2024-02-23 ENCOUNTER — OFFICE VISIT (OUTPATIENT)
Dept: FAMILY MEDICINE CLINIC | Facility: CLINIC | Age: 69
End: 2024-02-23
Payer: MEDICARE

## 2024-02-23 VITALS
BODY MASS INDEX: 32.43 KG/M2 | SYSTOLIC BLOOD PRESSURE: 122 MMHG | DIASTOLIC BLOOD PRESSURE: 80 MMHG | HEART RATE: 64 BPM | HEIGHT: 63 IN | OXYGEN SATURATION: 94 % | WEIGHT: 183 LBS | TEMPERATURE: 97.8 F

## 2024-02-23 DIAGNOSIS — R20.0 NUMBNESS AND TINGLING OF FOOT: ICD-10-CM

## 2024-02-23 DIAGNOSIS — G60.9 IDIOPATHIC PERIPHERAL NEUROPATHY: ICD-10-CM

## 2024-02-23 DIAGNOSIS — R20.2 NUMBNESS AND TINGLING OF FOOT: ICD-10-CM

## 2024-02-23 DIAGNOSIS — I10 ESSENTIAL HYPERTENSION: ICD-10-CM

## 2024-02-23 DIAGNOSIS — Z00.00 MEDICARE ANNUAL WELLNESS VISIT, SUBSEQUENT: Primary | ICD-10-CM

## 2024-02-23 DIAGNOSIS — E21.3 HYPERPARATHYROIDISM (HCC): ICD-10-CM

## 2024-02-23 DIAGNOSIS — E03.9 ACQUIRED HYPOTHYROIDISM: ICD-10-CM

## 2024-02-23 DIAGNOSIS — E53.8 B12 DEFICIENCY: ICD-10-CM

## 2024-02-23 PROBLEM — I87.322 STASIS DERMATITIS OF LEFT LOWER EXTREMITY DUE TO PERIPHERAL VENOUS HYPERTENSION: Status: RESOLVED | Noted: 2023-05-01 | Resolved: 2024-02-23

## 2024-02-23 PROCEDURE — G0439 PPPS, SUBSEQ VISIT: HCPCS | Performed by: NURSE PRACTITIONER

## 2024-02-23 PROCEDURE — 96372 THER/PROPH/DIAG INJ SC/IM: CPT | Performed by: NURSE PRACTITIONER

## 2024-02-23 RX ORDER — CYANOCOBALAMIN 1000 UG/ML
1000 INJECTION, SOLUTION INTRAMUSCULAR; SUBCUTANEOUS
Status: DISCONTINUED | OUTPATIENT
Start: 2024-02-23 | End: 2024-02-23

## 2024-02-23 RX ADMIN — CYANOCOBALAMIN 1000 MCG: 1000 INJECTION, SOLUTION INTRAMUSCULAR; SUBCUTANEOUS at 11:51
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
## 2024-02-23 NOTE — PATIENT INSTRUCTIONS
Medicare Preventive Visit Patient Instructions  Thank you for completing your Welcome to Medicare Visit or Medicare Annual Wellness Visit today. Your next wellness visit will be due in one year (2/23/2025).  The screening/preventive services that you may require over the next 5-10 years are detailed below. Some tests may not apply to you based off risk factors and/or age. Screening tests ordered at today's visit but not completed yet may show as past due. Also, please note that scanned in results may not display below.  Preventive Screenings:  Service Recommendations Previous Testing/Comments   Colorectal Cancer Screening  * Colonoscopy    * Fecal Occult Blood Test (FOBT)/Fecal Immunochemical Test (FIT)  * Fecal DNA/Cologuard Test  * Flexible Sigmoidoscopy Age: 45-75 years old   Colonoscopy: every 10 years (may be performed more frequently if at higher risk)  OR  FOBT/FIT: every 1 year  OR  Cologuard: every 3 years  OR  Sigmoidoscopy: every 5 years  Screening may be recommended earlier than age 45 if at higher risk for colorectal cancer. Also, an individualized decision between you and your healthcare provider will decide whether screening between the ages of 76-85 would be appropriate. Colonoscopy: 02/15/2023  FOBT/FIT: 02/15/2023  Cologuard: 02/15/2023  Sigmoidoscopy: 02/15/2023    Screening Current     Breast Cancer Screening Age: 40+ years old  Frequency: every 1-2 years  Not required if history of left and right mastectomy Mammogram: 08/23/2023    Screening Current   Cervical Cancer Screening Between the ages of 21-29, pap smear recommended once every 3 years.   Between the ages of 30-65, can perform pap smear with HPV co-testing every 5 years.   Recommendations may differ for women with a history of total hysterectomy, cervical cancer, or abnormal pap smears in past. Pap Smear: 07/10/2023    Screening Not Indicated   Hepatitis C Screening Once for adults born between 1945 and 1965  More frequently in patients  at high risk for Hepatitis C Hep C Antibody: 01/03/2020    Screening Current   Diabetes Screening 1-2 times per year if you're at risk for diabetes or have pre-diabetes Fasting glucose: 89 mg/dL (2/2/2024)  A1C: No results in last 5 years (No results in last 5 years)  Screening Current   Cholesterol Screening Once every 5 years if you don't have a lipid disorder. May order more often based on risk factors. Lipid panel: 02/02/2024    Screening Not Indicated  History Lipid Disorder     Other Preventive Screenings Covered by Medicare:  Abdominal Aortic Aneurysm (AAA) Screening: covered once if your at risk. You're considered to be at risk if you have a family history of AAA.  Lung Cancer Screening: covers low dose CT scan once per year if you meet all of the following conditions: (1) Age 55-77; (2) No signs or symptoms of lung cancer; (3) Current smoker or have quit smoking within the last 15 years; (4) You have a tobacco smoking history of at least 20 pack years (packs per day multiplied by number of years you smoked); (5) You get a written order from a healthcare provider.  Glaucoma Screening: covered annually if you're considered high risk: (1) You have diabetes OR (2) Family history of glaucoma OR (3)  aged 50 and older OR (4)  American aged 65 and older  Osteoporosis Screening: covered every 2 years if you meet one of the following conditions: (1) You're estrogen deficient and at risk for osteoporosis based off medical history and other findings; (2) Have a vertebral abnormality; (3) On glucocorticoid therapy for more than 3 months; (4) Have primary hyperparathyroidism; (5) On osteoporosis medications and need to assess response to drug therapy.   Last bone density test (DXA Scan): 10/27/2022.  HIV Screening: covered annually if you're between the age of 15-65. Also covered annually if you are younger than 15 and older than 65 with risk factors for HIV infection. For pregnant patients, it  is covered up to 3 times per pregnancy.    Immunizations:  Immunization Recommendations   Influenza Vaccine Annual influenza vaccination during flu season is recommended for all persons aged >= 6 months who do not have contraindications   Pneumococcal Vaccine   * Pneumococcal conjugate vaccine = PCV13 (Prevnar 13), PCV15 (Vaxneuvance), PCV20 (Prevnar 20)  * Pneumococcal polysaccharide vaccine = PPSV23 (Pneumovax) Adults 19-63 yo with certain risk factors or if 65+ yo  If never received any pneumonia vaccine: recommend Prevnar 20 (PCV20)  Give PCV20 if previously received 1 dose of PCV13 or PPSV23   Hepatitis B Vaccine 3 dose series if at intermediate or high risk (ex: diabetes, end stage renal disease, liver disease)   Respiratory syncytial virus (RSV) Vaccine - COVERED BY MEDICARE PART D  * RSVPreF3 (Arexvy) CDC recommends that adults 60 years of age and older may receive a single dose of RSV vaccine using shared clinical decision-making (SCDM)   Tetanus (Td) Vaccine - COST NOT COVERED BY MEDICARE PART B Following completion of primary series, a booster dose should be given every 10 years to maintain immunity against tetanus. Td may also be given as tetanus wound prophylaxis.   Tdap Vaccine - COST NOT COVERED BY MEDICARE PART B Recommended at least once for all adults. For pregnant patients, recommended with each pregnancy.   Shingles Vaccine (Shingrix) - COST NOT COVERED BY MEDICARE PART B  2 shot series recommended in those 19 years and older who have or will have weakened immune systems or those 50 years and older     Health Maintenance Due:      Topic Date Due   • Breast Cancer Screening: Mammogram  08/23/2024   • DXA SCAN  10/27/2024   • Colorectal Cancer Screening  02/14/2028   • Hepatitis C Screening  Completed     Immunizations Due:      Topic Date Due   • COVID-19 Vaccine (3 - Pfizer risk series) 07/02/2021     Advance Directives   What are advance directives?  Advance directives are legal documents that  state your wishes and plans for medical care. These plans are made ahead of time in case you lose your ability to make decisions for yourself. Advance directives can apply to any medical decision, such as the treatments you want, and if you want to donate organs.   What are the types of advance directives?  There are many types of advance directives, and each state has rules about how to use them. You may choose a combination of any of the following:  Living will:  This is a written record of the treatment you want. You can also choose which treatments you do not want, which to limit, and which to stop at a certain time. This includes surgery, medicine, IV fluid, and tube feedings.   Durable power of  for healthcare (DPAHC):  This is a written record that states who you want to make healthcare choices for you when you are unable to make them for yourself. This person, called a proxy, is usually a family member or a friend. You may choose more than 1 proxy.  Do not resuscitate (DNR) order:  A DNR order is used in case your heart stops beating or you stop breathing. It is a request not to have certain forms of treatment, such as CPR. A DNR order may be included in other types of advance directives.  Medical directive:  This covers the care that you want if you are in a coma, near death, or unable to make decisions for yourself. You can list the treatments you want for each condition. Treatment may include pain medicine, surgery, blood transfusions, dialysis, IV or tube feedings, and a ventilator (breathing machine).  Values history:  This document has questions about your views, beliefs, and how you feel and think about life. This information can help others choose the care that you would choose.  Why are advance directives important?  An advance directive helps you control your care. Although spoken wishes may be used, it is better to have your wishes written down. Spoken wishes can be misunderstood, or not  followed. Treatments may be given even if you do not want them. An advance directive may make it easier for your family to make difficult choices about your care.   Weight Management   Why it is important to manage your weight:  Being overweight increases your risk of health conditions such as heart disease, high blood pressure, type 2 diabetes, and certain types of cancer. It can also increase your risk for osteoarthritis, sleep apnea, and other respiratory problems. Aim for a slow, steady weight loss. Even a small amount of weight loss can lower your risk of health problems.  How to lose weight safely:  A safe and healthy way to lose weight is to eat fewer calories and get regular exercise. You can lose up about 1 pound a week by decreasing the number of calories you eat by 500 calories each day.   Healthy meal plan for weight management:  A healthy meal plan includes a variety of foods, contains fewer calories, and helps you stay healthy. A healthy meal plan includes the following:  Eat whole-grain foods more often.  A healthy meal plan should contain fiber. Fiber is the part of grains, fruits, and vegetables that is not broken down by your body. Whole-grain foods are healthy and provide extra fiber in your diet. Some examples of whole-grain foods are whole-wheat breads and pastas, oatmeal, brown rice, and bulgur.  Eat a variety of vegetables every day.  Include dark, leafy greens such as spinach, kale, bethany greens, and mustard greens. Eat yellow and orange vegetables such as carrots, sweet potatoes, and winter squash.   Eat a variety of fruits every day.  Choose fresh or canned fruit (canned in its own juice or light syrup) instead of juice. Fruit juice has very little or no fiber.  Eat low-fat dairy foods.  Drink fat-free (skim) milk or 1% milk. Eat fat-free yogurt and low-fat cottage cheese. Try low-fat cheeses such as mozzarella and other reduced-fat cheeses.  Choose meat and other protein foods that are  low in fat.  Choose beans or other legumes such as split peas or lentils. Choose fish, skinless poultry (chicken or turkey), or lean cuts of red meat (beef or pork). Before you cook meat or poultry, cut off any visible fat.   Use less fat and oil.  Try baking foods instead of frying them. Add less fat, such as margarine, sour cream, regular salad dressing and mayonnaise to foods. Eat fewer high-fat foods. Some examples of high-fat foods include french fries, doughnuts, ice cream, and cakes.  Eat fewer sweets.  Limit foods and drinks that are high in sugar. This includes candy, cookies, regular soda, and sweetened drinks.  Exercise:  Exercise at least 30 minutes per day on most days of the week. Some examples of exercise include walking, biking, dancing, and swimming. You can also fit in more physical activity by taking the stairs instead of the elevator or parking farther away from stores. Ask your healthcare provider about the best exercise plan for you.      © Copyright [employer] 2018 Information is for End User's use only and may not be sold, redistributed or otherwise used for commercial purposes. All illustrations and images included in CareNotes® are the copyrighted property of [employer] or [employer]

## 2024-03-12 DIAGNOSIS — Z79.899 HIGH RISK MEDICATION USE: ICD-10-CM

## 2024-03-12 DIAGNOSIS — M06.00 SERONEGATIVE RHEUMATOID ARTHRITIS (HCC): ICD-10-CM

## 2024-03-12 RX ORDER — METHOTREXATE 2.5 MG/1
TABLET ORAL
Qty: 48 TABLET | Refills: 1 | Status: SHIPPED | OUTPATIENT
Start: 2024-03-12 | End: 2024-06-10

## 2024-04-11 DIAGNOSIS — Z79.899 HIGH RISK MEDICATION USE: ICD-10-CM

## 2024-04-11 DIAGNOSIS — M06.00 SERONEGATIVE RHEUMATOID ARTHRITIS (HCC): ICD-10-CM

## 2024-04-11 RX ORDER — METHOTREXATE 2.5 MG/1
TABLET ORAL
Qty: 48 TABLET | Refills: 0 | Status: SHIPPED | OUTPATIENT
Start: 2024-04-11 | End: 2024-07-10

## 2024-04-19 DIAGNOSIS — I10 ESSENTIAL HYPERTENSION: ICD-10-CM

## 2024-04-19 RX ORDER — HYDROCHLOROTHIAZIDE 25 MG/1
25 TABLET ORAL DAILY
Qty: 90 TABLET | Refills: 0 | Status: SHIPPED | OUTPATIENT
Start: 2024-04-19

## 2024-04-22 ENCOUNTER — APPOINTMENT (OUTPATIENT)
Dept: LAB | Facility: HOSPITAL | Age: 69
End: 2024-04-22
Payer: MEDICARE

## 2024-04-22 DIAGNOSIS — M06.00 SERONEGATIVE RHEUMATOID ARTHRITIS (HCC): ICD-10-CM

## 2024-04-22 DIAGNOSIS — Z79.899 HIGH RISK MEDICATION USE: ICD-10-CM

## 2024-04-22 DIAGNOSIS — Z79.1 ENCOUNTER FOR LONG-TERM (CURRENT) USE OF NSAIDS: ICD-10-CM

## 2024-04-22 LAB
ALBUMIN SERPL BCP-MCNC: 3.8 G/DL (ref 3.5–5)
ALP SERPL-CCNC: 53 U/L (ref 34–104)
ALT SERPL W P-5'-P-CCNC: 13 U/L (ref 7–52)
ANION GAP SERPL CALCULATED.3IONS-SCNC: 7 MMOL/L (ref 4–13)
AST SERPL W P-5'-P-CCNC: 16 U/L (ref 13–39)
BASOPHILS # BLD AUTO: 0.03 THOUSANDS/ÂΜL (ref 0–0.1)
BASOPHILS NFR BLD AUTO: 1 % (ref 0–1)
BILIRUB SERPL-MCNC: 0.45 MG/DL (ref 0.2–1)
BUN SERPL-MCNC: 33 MG/DL (ref 5–25)
CALCIUM SERPL-MCNC: 9.3 MG/DL (ref 8.4–10.2)
CHLORIDE SERPL-SCNC: 104 MMOL/L (ref 96–108)
CO2 SERPL-SCNC: 29 MMOL/L (ref 21–32)
CREAT SERPL-MCNC: 0.84 MG/DL (ref 0.6–1.3)
CRP SERPL QL: 20.6 MG/L
EOSINOPHIL # BLD AUTO: 0.18 THOUSAND/ÂΜL (ref 0–0.61)
EOSINOPHIL NFR BLD AUTO: 4 % (ref 0–6)
ERYTHROCYTE [DISTWIDTH] IN BLOOD BY AUTOMATED COUNT: 13.7 % (ref 11.6–15.1)
ERYTHROCYTE [SEDIMENTATION RATE] IN BLOOD: 23 MM/HOUR (ref 0–29)
GFR SERPL CREATININE-BSD FRML MDRD: 71 ML/MIN/1.73SQ M
GLUCOSE P FAST SERPL-MCNC: 93 MG/DL (ref 65–99)
HCT VFR BLD AUTO: 38.7 % (ref 34.8–46.1)
HGB BLD-MCNC: 12.5 G/DL (ref 11.5–15.4)
IMM GRANULOCYTES # BLD AUTO: 0.02 THOUSAND/UL (ref 0–0.2)
IMM GRANULOCYTES NFR BLD AUTO: 0 % (ref 0–2)
LYMPHOCYTES # BLD AUTO: 1 THOUSANDS/ÂΜL (ref 0.6–4.47)
LYMPHOCYTES NFR BLD AUTO: 20 % (ref 14–44)
MCH RBC QN AUTO: 29.5 PG (ref 26.8–34.3)
MCHC RBC AUTO-ENTMCNC: 32.3 G/DL (ref 31.4–37.4)
MCV RBC AUTO: 91 FL (ref 82–98)
MONOCYTES # BLD AUTO: 0.46 THOUSAND/ÂΜL (ref 0.17–1.22)
MONOCYTES NFR BLD AUTO: 9 % (ref 4–12)
NEUTROPHILS # BLD AUTO: 3.24 THOUSANDS/ÂΜL (ref 1.85–7.62)
NEUTS SEG NFR BLD AUTO: 66 % (ref 43–75)
NRBC BLD AUTO-RTO: 0 /100 WBCS
PLATELET # BLD AUTO: 401 THOUSANDS/UL (ref 149–390)
PMV BLD AUTO: 10.4 FL (ref 8.9–12.7)
POTASSIUM SERPL-SCNC: 4 MMOL/L (ref 3.5–5.3)
PROT SERPL-MCNC: 7.4 G/DL (ref 6.4–8.4)
RBC # BLD AUTO: 4.24 MILLION/UL (ref 3.81–5.12)
SODIUM SERPL-SCNC: 140 MMOL/L (ref 135–147)
WBC # BLD AUTO: 4.93 THOUSAND/UL (ref 4.31–10.16)

## 2024-04-22 PROCEDURE — 36415 COLL VENOUS BLD VENIPUNCTURE: CPT

## 2024-04-22 PROCEDURE — 85652 RBC SED RATE AUTOMATED: CPT

## 2024-04-22 PROCEDURE — 85025 COMPLETE CBC W/AUTO DIFF WBC: CPT

## 2024-04-22 PROCEDURE — 80053 COMPREHEN METABOLIC PANEL: CPT

## 2024-04-22 PROCEDURE — 86140 C-REACTIVE PROTEIN: CPT

## 2024-04-23 PROBLEM — Z00.00 MEDICARE ANNUAL WELLNESS VISIT, SUBSEQUENT: Status: RESOLVED | Noted: 2024-02-23 | Resolved: 2024-04-23

## 2024-05-21 ENCOUNTER — TELEPHONE (OUTPATIENT)
Dept: RHEUMATOLOGY | Facility: CLINIC | Age: 69
End: 2024-05-21

## 2024-05-21 NOTE — TELEPHONE ENCOUNTER
Patient called the RX Refill Line. Message is being forwarded to the office.     Patient is requesting Methotrexate 2.5mg to take 6 tablets once a week, her script was written for 4 tablets once a week and the pharmacy will not fill.     Please contact patient at  [phone] to verify dosage.

## 2024-05-22 ENCOUNTER — TELEPHONE (OUTPATIENT)
Dept: RHEUMATOLOGY | Facility: CLINIC | Age: 69
End: 2024-05-22

## 2024-05-22 DIAGNOSIS — Z79.899 HIGH RISK MEDICATION USE: ICD-10-CM

## 2024-05-22 DIAGNOSIS — M06.00 SERONEGATIVE RHEUMATOID ARTHRITIS (HCC): ICD-10-CM

## 2024-05-22 RX ORDER — METHOTREXATE 2.5 MG/1
15 TABLET ORAL WEEKLY
Qty: 72 TABLET | Refills: 1 | Status: SHIPPED | OUTPATIENT
Start: 2024-05-22 | End: 2024-08-20

## 2024-05-22 NOTE — TELEPHONE ENCOUNTER
Can let her know I sent updated rx for 6 tabs weekly to [facility] pharmacy. Thanks

## 2024-05-22 NOTE — TELEPHONE ENCOUNTER
The patient was called and her  was spoke with. He was told that her prescription was updated and sent to her pharmacy.

## 2024-06-02 ENCOUNTER — APPOINTMENT (EMERGENCY)
Dept: RADIOLOGY | Facility: HOSPITAL | Age: 69
End: 2024-06-02
Payer: MEDICARE

## 2024-06-02 ENCOUNTER — APPOINTMENT (EMERGENCY)
Dept: CT IMAGING | Facility: HOSPITAL | Age: 69
End: 2024-06-02
Payer: MEDICARE

## 2024-06-02 ENCOUNTER — HOSPITAL ENCOUNTER (EMERGENCY)
Facility: HOSPITAL | Age: 69
Discharge: HOME | End: 2024-06-02
Attending: EMERGENCY MEDICINE | Admitting: EMERGENCY MEDICINE
Payer: MEDICARE

## 2024-06-02 VITALS
RESPIRATION RATE: 18 BRPM | TEMPERATURE: 98 F | HEIGHT: 66 IN | SYSTOLIC BLOOD PRESSURE: 131 MMHG | HEART RATE: 73 BPM | WEIGHT: 173 LBS | DIASTOLIC BLOOD PRESSURE: 65 MMHG | BODY MASS INDEX: 27.8 KG/M2 | OXYGEN SATURATION: 95 %

## 2024-06-02 DIAGNOSIS — R07.9 CHEST PAIN: Primary | ICD-10-CM

## 2024-06-02 LAB
2HR DELTA HS TROPONIN: 0 NG/L
ALBUMIN SERPL BCP-MCNC: 4.1 G/DL (ref 3.5–5)
ALP SERPL-CCNC: 62 U/L (ref 34–104)
ALT SERPL W P-5'-P-CCNC: 13 U/L (ref 7–52)
ANION GAP SERPL CALCULATED.3IONS-SCNC: 9 MMOL/L (ref 4–13)
AST SERPL W P-5'-P-CCNC: 16 U/L (ref 13–39)
ATRIAL RATE: 67 BPM
ATRIAL RATE: 68 BPM
BASOPHILS # BLD AUTO: 0.04 THOUSANDS/ÂΜL (ref 0–0.1)
BASOPHILS NFR BLD AUTO: 1 % (ref 0–1)
BILIRUB SERPL-MCNC: 0.42 MG/DL (ref 0.2–1)
BNP SERPL-MCNC: 72 PG/ML (ref 0–100)
BUN SERPL-MCNC: 26 MG/DL (ref 5–25)
CALCIUM SERPL-MCNC: 10.1 MG/DL (ref 8.4–10.2)
CARDIAC TROPONIN I PNL SERPL HS: 6 NG/L
CARDIAC TROPONIN I PNL SERPL HS: 6 NG/L
CHLORIDE SERPL-SCNC: 101 MMOL/L (ref 96–108)
CO2 SERPL-SCNC: 27 MMOL/L (ref 21–32)
CREAT SERPL-MCNC: 0.81 MG/DL (ref 0.6–1.3)
D DIMER PPP FEU-MCNC: 2.13 UG/ML FEU
EOSINOPHIL # BLD AUTO: 0.15 THOUSAND/ÂΜL (ref 0–0.61)
EOSINOPHIL NFR BLD AUTO: 2 % (ref 0–6)
ERYTHROCYTE [DISTWIDTH] IN BLOOD BY AUTOMATED COUNT: 13.7 % (ref 11.6–15.1)
GFR SERPL CREATININE-BSD FRML MDRD: 74 ML/MIN/1.73SQ M
GLUCOSE SERPL-MCNC: 91 MG/DL (ref 65–140)
HCT VFR BLD AUTO: 36 % (ref 34.8–46.1)
HGB BLD-MCNC: 11.8 G/DL (ref 11.5–15.4)
IMM GRANULOCYTES # BLD AUTO: 0.01 THOUSAND/UL (ref 0–0.2)
IMM GRANULOCYTES NFR BLD AUTO: 0 % (ref 0–2)
LYMPHOCYTES # BLD AUTO: 1.1 THOUSANDS/ÂΜL (ref 0.6–4.47)
LYMPHOCYTES NFR BLD AUTO: 17 % (ref 14–44)
MCH RBC QN AUTO: 29.1 PG (ref 26.8–34.3)
MCHC RBC AUTO-ENTMCNC: 32.8 G/DL (ref 31.4–37.4)
MCV RBC AUTO: 89 FL (ref 82–98)
MONOCYTES # BLD AUTO: 0.62 THOUSAND/ÂΜL (ref 0.17–1.22)
MONOCYTES NFR BLD AUTO: 10 % (ref 4–12)
NEUTROPHILS # BLD AUTO: 4.6 THOUSANDS/ÂΜL (ref 1.85–7.62)
NEUTS SEG NFR BLD AUTO: 70 % (ref 43–75)
NRBC BLD AUTO-RTO: 0 /100 WBCS
P AXIS: 58 DEGREES
P AXIS: 60 DEGREES
PLATELET # BLD AUTO: 402 THOUSANDS/UL (ref 149–390)
PMV BLD AUTO: 9.9 FL (ref 8.9–12.7)
POTASSIUM SERPL-SCNC: 3.8 MMOL/L (ref 3.5–5.3)
PR INTERVAL: 160 MS
PR INTERVAL: 166 MS
PROT SERPL-MCNC: 7.8 G/DL (ref 6.4–8.4)
QRS AXIS: 51 DEGREES
QRS AXIS: 56 DEGREES
QRSD INTERVAL: 82 MS
QRSD INTERVAL: 88 MS
QT INTERVAL: 370 MS
QT INTERVAL: 380 MS
QTC INTERVAL: 390 MS
QTC INTERVAL: 404 MS
RBC # BLD AUTO: 4.05 MILLION/UL (ref 3.81–5.12)
SODIUM SERPL-SCNC: 137 MMOL/L (ref 135–147)
T WAVE AXIS: 31 DEGREES
T WAVE AXIS: 33 DEGREES
VENTRICULAR RATE: 67 BPM
VENTRICULAR RATE: 68 BPM
WBC # BLD AUTO: 6.52 THOUSAND/UL (ref 4.31–10.16)

## 2024-06-02 PROCEDURE — 83880 ASSAY OF NATRIURETIC PEPTIDE: CPT | Performed by: EMERGENCY MEDICINE

## 2024-06-02 PROCEDURE — 84484 ASSAY OF TROPONIN QUANT: CPT | Performed by: EMERGENCY MEDICINE

## 2024-06-02 PROCEDURE — 93005 ELECTROCARDIOGRAM TRACING: CPT

## 2024-06-02 PROCEDURE — 85025 COMPLETE CBC W/AUTO DIFF WBC: CPT | Performed by: EMERGENCY MEDICINE

## 2024-06-02 PROCEDURE — 99285 EMERGENCY DEPT VISIT HI MDM: CPT

## 2024-06-02 PROCEDURE — 85379 FIBRIN DEGRADATION QUANT: CPT | Performed by: EMERGENCY MEDICINE

## 2024-06-02 PROCEDURE — 71046 X-RAY EXAM CHEST 2 VIEWS: CPT

## 2024-06-02 PROCEDURE — 99285 EMERGENCY DEPT VISIT HI MDM: CPT | Performed by: EMERGENCY MEDICINE

## 2024-06-02 PROCEDURE — 93010 ELECTROCARDIOGRAM REPORT: CPT | Performed by: INTERNAL MEDICINE

## 2024-06-02 PROCEDURE — 80053 COMPREHEN METABOLIC PANEL: CPT | Performed by: EMERGENCY MEDICINE

## 2024-06-02 PROCEDURE — 36415 COLL VENOUS BLD VENIPUNCTURE: CPT | Performed by: EMERGENCY MEDICINE

## 2024-06-02 PROCEDURE — 71275 CT ANGIOGRAPHY CHEST: CPT

## 2024-06-02 RX ORDER — SODIUM CHLORIDE 9 MG/ML
3 INJECTION INTRAVENOUS
Status: DISCONTINUED | OUTPATIENT
Start: 2024-06-02 | End: 2024-06-02 | Stop reason: HOSPADM

## 2024-06-02 RX ADMIN — IOHEXOL 85 ML: 350 INJECTION, SOLUTION INTRAVENOUS at 10:44

## 2024-06-02 NOTE — DISCHARGE INSTRUCTIONS
Your CT scan shows Minimal left pleural fluid, new. Peripheral reticular opacities in the subpleural lingular region, new compared to the prior, appearance favoring scarring. Please follow up with your PCP in regards to these findings.
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
## 2024-06-04 ENCOUNTER — OFFICE VISIT (OUTPATIENT)
Dept: FAMILY MEDICINE CLINIC | Facility: CLINIC | Age: 69
End: 2024-06-04
Payer: MEDICARE

## 2024-06-04 VITALS
OXYGEN SATURATION: 98 % | TEMPERATURE: 97.8 F | HEART RATE: 72 BPM | DIASTOLIC BLOOD PRESSURE: 82 MMHG | BODY MASS INDEX: 28.25 KG/M2 | SYSTOLIC BLOOD PRESSURE: 138 MMHG | HEIGHT: 66 IN | WEIGHT: 175.8 LBS

## 2024-06-04 DIAGNOSIS — R07.9 CHEST PAIN, UNSPECIFIED TYPE: Primary | ICD-10-CM

## 2024-06-04 PROCEDURE — 99213 OFFICE O/P EST LOW 20 MIN: CPT | Performed by: NURSE PRACTITIONER

## 2024-06-04 PROCEDURE — G2211 COMPLEX E/M VISIT ADD ON: HCPCS | Performed by: NURSE PRACTITIONER

## 2024-06-04 NOTE — ASSESSMENT & PLAN NOTE
Patient here for follow up from ED and had CT chest and PE r/o and ACS r/o no prior episodes of chest pain ACS. Patient symptoms are resolving with taking the ibuprofen and no longer having the pain.
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
## 2024-06-19 DIAGNOSIS — I10 ESSENTIAL HYPERTENSION: ICD-10-CM

## 2024-06-19 RX ORDER — OLMESARTAN MEDOXOMIL 40 MG/1
40 TABLET ORAL DAILY
Qty: 90 TABLET | Refills: 1 | Status: SHIPPED | OUTPATIENT
Start: 2024-06-19

## 2024-07-16 ENCOUNTER — TELEPHONE (OUTPATIENT)
Age: 69
End: 2024-07-16

## 2024-07-16 DIAGNOSIS — I10 ESSENTIAL HYPERTENSION: ICD-10-CM

## 2024-07-16 DIAGNOSIS — E21.3 HYPERPARATHYROIDISM (HCC): ICD-10-CM

## 2024-07-16 DIAGNOSIS — E78.2 MIXED HYPERLIPIDEMIA: ICD-10-CM

## 2024-07-16 DIAGNOSIS — E03.9 ACQUIRED HYPOTHYROIDISM: ICD-10-CM
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
## 2024-07-16 DIAGNOSIS — S50.869A TICK BITE OF FOREARM, UNSPECIFIED LATERALITY, INITIAL ENCOUNTER: Primary | ICD-10-CM

## 2024-07-16 NOTE — TELEPHONE ENCOUNTER
Pt called, asked if blood work orders could be placed for her, has appt in August. Also asked if an order to test for Lyme's could also be placed with those as well. Please advise.

## 2024-07-18 ENCOUNTER — APPOINTMENT (OUTPATIENT)
Dept: LAB | Facility: HOSPITAL | Age: 69
End: 2024-07-18
Payer: MEDICARE

## 2024-07-18 DIAGNOSIS — I10 ESSENTIAL HYPERTENSION: ICD-10-CM

## 2024-07-18 DIAGNOSIS — M06.00 SERONEGATIVE RHEUMATOID ARTHRITIS (HCC): ICD-10-CM

## 2024-07-18 DIAGNOSIS — E21.3 HYPERPARATHYROIDISM (HCC): ICD-10-CM

## 2024-07-18 DIAGNOSIS — E03.9 ACQUIRED HYPOTHYROIDISM: ICD-10-CM
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
## 2024-07-18 DIAGNOSIS — S50.869A TICK BITE OF FOREARM, UNSPECIFIED LATERALITY, INITIAL ENCOUNTER: ICD-10-CM

## 2024-07-18 DIAGNOSIS — E78.2 MIXED HYPERLIPIDEMIA: ICD-10-CM

## 2024-07-18 DIAGNOSIS — Z79.1 ENCOUNTER FOR LONG-TERM (CURRENT) USE OF NSAIDS: ICD-10-CM

## 2024-07-18 DIAGNOSIS — Z79.899 HIGH RISK MEDICATION USE: ICD-10-CM

## 2024-07-18 LAB
ALBUMIN SERPL BCG-MCNC: 3.7 G/DL (ref 3.5–5)
ALP SERPL-CCNC: 52 U/L (ref 34–104)
ALT SERPL W P-5'-P-CCNC: 13 U/L (ref 7–52)
ANION GAP SERPL CALCULATED.3IONS-SCNC: 10 MMOL/L (ref 4–13)
AST SERPL W P-5'-P-CCNC: 15 U/L (ref 13–39)
B BURGDOR IGG+IGM SER QL IA: NEGATIVE
BASOPHILS # BLD AUTO: 0.04 THOUSANDS/ÂΜL (ref 0–0.1)
BASOPHILS NFR BLD AUTO: 1 % (ref 0–1)
BILIRUB SERPL-MCNC: 0.58 MG/DL (ref 0.2–1)
BUN SERPL-MCNC: 22 MG/DL (ref 5–25)
CALCIUM SERPL-MCNC: 9.6 MG/DL (ref 8.4–10.2)
CHLORIDE SERPL-SCNC: 103 MMOL/L (ref 96–108)
CHOLEST SERPL-MCNC: 199 MG/DL
CO2 SERPL-SCNC: 28 MMOL/L (ref 21–32)
CREAT SERPL-MCNC: 0.86 MG/DL (ref 0.6–1.3)
CRP SERPL QL: 97.5 MG/L
EOSINOPHIL # BLD AUTO: 0.26 THOUSAND/ÂΜL (ref 0–0.61)
EOSINOPHIL NFR BLD AUTO: 4 % (ref 0–6)
ERYTHROCYTE [DISTWIDTH] IN BLOOD BY AUTOMATED COUNT: 13.4 % (ref 11.6–15.1)
ERYTHROCYTE [SEDIMENTATION RATE] IN BLOOD: 67 MM/HOUR (ref 0–29)
GFR SERPL CREATININE-BSD FRML MDRD: 69 ML/MIN/1.73SQ M
GLUCOSE P FAST SERPL-MCNC: 97 MG/DL (ref 65–99)
HCT VFR BLD AUTO: 34.4 % (ref 34.8–46.1)
HDLC SERPL-MCNC: 52 MG/DL
HGB BLD-MCNC: 11.2 G/DL (ref 11.5–15.4)
IMM GRANULOCYTES # BLD AUTO: 0.02 THOUSAND/UL (ref 0–0.2)
IMM GRANULOCYTES NFR BLD AUTO: 0 % (ref 0–2)
LDLC SERPL CALC-MCNC: 125 MG/DL (ref 0–100)
LYMPHOCYTES # BLD AUTO: 1.16 THOUSANDS/ÂΜL (ref 0.6–4.47)
LYMPHOCYTES NFR BLD AUTO: 16 % (ref 14–44)
MCH RBC QN AUTO: 28.8 PG (ref 26.8–34.3)
MCHC RBC AUTO-ENTMCNC: 32.6 G/DL (ref 31.4–37.4)
MCV RBC AUTO: 88 FL (ref 82–98)
MONOCYTES # BLD AUTO: 0.68 THOUSAND/ÂΜL (ref 0.17–1.22)
MONOCYTES NFR BLD AUTO: 9 % (ref 4–12)
NEUTROPHILS # BLD AUTO: 5.14 THOUSANDS/ÂΜL (ref 1.85–7.62)
NEUTS SEG NFR BLD AUTO: 70 % (ref 43–75)
NONHDLC SERPL-MCNC: 147 MG/DL
NRBC BLD AUTO-RTO: 0 /100 WBCS
PLATELET # BLD AUTO: 470 THOUSANDS/UL (ref 149–390)
PMV BLD AUTO: 9.8 FL (ref 8.9–12.7)
POTASSIUM SERPL-SCNC: 4.1 MMOL/L (ref 3.5–5.3)
PROT SERPL-MCNC: 8 G/DL (ref 6.4–8.4)
PTH-INTACT SERPL-MCNC: 47.6 PG/ML (ref 12–88)
RBC # BLD AUTO: 3.89 MILLION/UL (ref 3.81–5.12)
SODIUM SERPL-SCNC: 141 MMOL/L (ref 135–147)
TRIGL SERPL-MCNC: 111 MG/DL
TSH SERPL DL<=0.05 MIU/L-ACNC: 2.67 UIU/ML (ref 0.45–4.5)
WBC # BLD AUTO: 7.3 THOUSAND/UL (ref 4.31–10.16)

## 2024-07-18 PROCEDURE — 83970 ASSAY OF PARATHORMONE: CPT

## 2024-07-18 PROCEDURE — 80061 LIPID PANEL: CPT

## 2024-07-18 PROCEDURE — 86140 C-REACTIVE PROTEIN: CPT

## 2024-07-18 PROCEDURE — 86618 LYME DISEASE ANTIBODY: CPT

## 2024-07-18 PROCEDURE — 84443 ASSAY THYROID STIM HORMONE: CPT

## 2024-07-18 PROCEDURE — 36415 COLL VENOUS BLD VENIPUNCTURE: CPT

## 2024-07-18 PROCEDURE — 85025 COMPLETE CBC W/AUTO DIFF WBC: CPT

## 2024-07-18 PROCEDURE — 85652 RBC SED RATE AUTOMATED: CPT

## 2024-07-18 PROCEDURE — 80053 COMPREHEN METABOLIC PANEL: CPT

## 2024-08-19 ENCOUNTER — OFFICE VISIT (OUTPATIENT)
Dept: RHEUMATOLOGY | Facility: CLINIC | Age: 69
End: 2024-08-19
Payer: MEDICARE

## 2024-08-19 ENCOUNTER — RA CDI HCC (OUTPATIENT)
Dept: OTHER | Facility: HOSPITAL | Age: 69
End: 2024-08-19

## 2024-08-19 VITALS
WEIGHT: 169 LBS | HEART RATE: 71 BPM | TEMPERATURE: 97.8 F | SYSTOLIC BLOOD PRESSURE: 132 MMHG | OXYGEN SATURATION: 99 % | DIASTOLIC BLOOD PRESSURE: 70 MMHG | HEIGHT: 66 IN | BODY MASS INDEX: 27.16 KG/M2

## 2024-08-19 DIAGNOSIS — Z79.899 HIGH RISK MEDICATION USE: ICD-10-CM

## 2024-08-19 DIAGNOSIS — M15.9 PRIMARY OSTEOARTHRITIS INVOLVING MULTIPLE JOINTS: ICD-10-CM

## 2024-08-19 DIAGNOSIS — R26.81 GAIT INSTABILITY: ICD-10-CM

## 2024-08-19 DIAGNOSIS — M06.00 SERONEGATIVE RHEUMATOID ARTHRITIS (HCC): Primary | ICD-10-CM

## 2024-08-19 PROCEDURE — 99214 OFFICE O/P EST MOD 30 MIN: CPT | Performed by: PHYSICIAN ASSISTANT

## 2024-08-19 RX ORDER — METHOTREXATE 2.5 MG/1
15 TABLET ORAL WEEKLY
Qty: 72 TABLET | Refills: 1 | Status: SHIPPED | OUTPATIENT
Start: 2024-08-19 | End: 2024-11-17

## 2024-08-19 RX ORDER — FOLIC ACID 1 MG/1
1 TABLET ORAL DAILY
Qty: 90 TABLET | Refills: 3 | Status: SHIPPED | OUTPATIENT
Start: 2024-08-19 | End: 2024-11-17
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
## 2024-08-23 ENCOUNTER — OFFICE VISIT (OUTPATIENT)
Dept: FAMILY MEDICINE CLINIC | Facility: CLINIC | Age: 69
End: 2024-08-23
Payer: MEDICARE

## 2024-08-23 VITALS
HEIGHT: 66 IN | WEIGHT: 170 LBS | TEMPERATURE: 97.9 F | SYSTOLIC BLOOD PRESSURE: 128 MMHG | OXYGEN SATURATION: 98 % | DIASTOLIC BLOOD PRESSURE: 80 MMHG | BODY MASS INDEX: 27.32 KG/M2 | HEART RATE: 72 BPM

## 2024-08-23 DIAGNOSIS — M06.09 RHEUMATOID ARTHRITIS OF MULTIPLE SITES WITH NEGATIVE RHEUMATOID FACTOR (HCC): ICD-10-CM

## 2024-08-23 DIAGNOSIS — E03.9 ACQUIRED HYPOTHYROIDISM: ICD-10-CM

## 2024-08-23 DIAGNOSIS — I10 ESSENTIAL HYPERTENSION: Primary | ICD-10-CM

## 2024-08-23 DIAGNOSIS — E78.2 MIXED HYPERLIPIDEMIA: ICD-10-CM

## 2024-08-23 DIAGNOSIS — E21.3 HYPERPARATHYROIDISM (HCC): ICD-10-CM

## 2024-08-23 DIAGNOSIS — M17.2 BILATERAL POST-TRAUMATIC OSTEOARTHRITIS OF KNEE: ICD-10-CM

## 2024-08-23 PROBLEM — I89.0 LYMPHEDEMA OF BOTH LOWER EXTREMITIES: Status: RESOLVED | Noted: 2023-09-05 | Resolved: 2024-08-23

## 2024-08-23 PROBLEM — E83.52 HYPERCALCEMIA: Status: RESOLVED | Noted: 2021-01-12 | Resolved: 2024-08-23

## 2024-08-23 PROBLEM — R07.9 CHEST PAIN: Status: RESOLVED | Noted: 2024-06-04 | Resolved: 2024-08-23

## 2024-08-23 PROCEDURE — G2211 COMPLEX E/M VISIT ADD ON: HCPCS | Performed by: NURSE PRACTITIONER

## 2024-08-23 PROCEDURE — 99214 OFFICE O/P EST MOD 30 MIN: CPT | Performed by: NURSE PRACTITIONER

## 2024-08-23 RX ORDER — ROSUVASTATIN CALCIUM 20 MG/1
20 TABLET, COATED ORAL DAILY
Qty: 90 TABLET | Refills: 3 | Status: SHIPPED | OUTPATIENT
Start: 2024-08-23

## 2024-08-23 RX ORDER — HYDROCHLOROTHIAZIDE 25 MG/1
25 TABLET ORAL DAILY
Qty: 90 TABLET | Refills: 3 | Status: SHIPPED | OUTPATIENT
Start: 2024-08-23

## 2024-08-23 RX ORDER — OLMESARTAN MEDOXOMIL 40 MG/1
40 TABLET ORAL DAILY
Start: 2024-08-23 | End: 2024-08-23 | Stop reason: SDUPTHER

## 2024-08-23 RX ORDER — OLMESARTAN MEDOXOMIL 40 MG/1
40 TABLET ORAL DAILY
Qty: 90 TABLET | Refills: 3 | Status: SHIPPED | OUTPATIENT
Start: 2024-08-23 | End: 2024-11-21
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
## 2024-10-02 ENCOUNTER — EVALUATION (OUTPATIENT)
Age: 69
End: 2024-10-02
Payer: MEDICARE

## 2024-10-02 DIAGNOSIS — R26.89 BALANCE DISORDER: Primary | ICD-10-CM

## 2024-10-02 DIAGNOSIS — M15.0 PRIMARY OSTEOARTHRITIS INVOLVING MULTIPLE JOINTS: ICD-10-CM

## 2024-10-02 DIAGNOSIS — R26.81 GAIT INSTABILITY: ICD-10-CM

## 2024-10-02 PROCEDURE — 97161 PT EVAL LOW COMPLEX 20 MIN: CPT

## 2024-10-02 PROCEDURE — 97530 THERAPEUTIC ACTIVITIES: CPT
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
## 2024-10-03 ENCOUNTER — OFFICE VISIT (OUTPATIENT)
Age: 69
End: 2024-10-03
Payer: MEDICARE

## 2024-10-03 DIAGNOSIS — M15.0 PRIMARY OSTEOARTHRITIS INVOLVING MULTIPLE JOINTS: ICD-10-CM

## 2024-10-03 DIAGNOSIS — R26.81 GAIT INSTABILITY: ICD-10-CM

## 2024-10-03 DIAGNOSIS — R26.89 BALANCE DISORDER: Primary | ICD-10-CM

## 2024-10-03 PROCEDURE — 97112 NEUROMUSCULAR REEDUCATION: CPT

## 2024-10-03 NOTE — PROGRESS NOTES
"Daily Note     Today's date: 10/3/2024  Patient name: Shiva Shabazz  : 1955  MRN: [id number]  Referring provider: Krunal Aponte PA-C  Dx:   Encounter Diagnosis     ICD-10-CM    1. Balance disorder  R26.89       2. Gait instability  R26.81       3. Primary osteoarthritis involving multiple joints  M15.0         Start Time: 1100  Stop Time: 1145  Total time in clinic (min): 45 minutes    Subjective: She reports she is doing well.      Objective: See treatment diary below    High knee march 40'x2 with 1 finger support  Lateral walking with no UE support 40'x2 each way  Forward/backward with 10# tidal tank 40'x2 each way  Hurdles forward/lateral 3 laps each with 1 UE support and 3# ankle weights bilaterally  Sit to stand 2x10 with blue med ball  Semi-tandem amb 40'x4  Feet apart EC firm 30\"X2  Feet together EC firm 30\"x2  Feet apart EC foma 30\"x2  Feet apart EC foam 30\"x2  Eccentric lower lateral on 4\" step, x10 each side    Assessment: Minor losses of balance throughout, but she is able to use appropriate righting reactions. Vestibular related balance appears to be impaired when her base of support is narrow. However, she is aware and uses her arms/hands to correct her balance in static conditions. Notable weakness in her R proximal hip musculature and poor eccentric control for lowering down on steps. Continue skilled PT to maximize her safety and function.      Plan: Continue per plan of care.      POC expires Unit limit Auth Expiration date PT/OT + Visit Limit? Co-Insurance    N/a pend BOMN Yes                               Visit/Unit Tracking  AUTH Status:  Date 10/2 10/3             pend Used                Remaining                         "

## 2024-10-04 ENCOUNTER — OFFICE VISIT (OUTPATIENT)
Age: 69
End: 2024-10-04
Payer: MEDICARE

## 2024-10-04 DIAGNOSIS — M15.0 PRIMARY OSTEOARTHRITIS INVOLVING MULTIPLE JOINTS: ICD-10-CM

## 2024-10-04 DIAGNOSIS — R26.89 BALANCE DISORDER: Primary | ICD-10-CM

## 2024-10-04 DIAGNOSIS — R26.81 GAIT INSTABILITY: ICD-10-CM

## 2024-10-04 PROCEDURE — 97112 NEUROMUSCULAR REEDUCATION: CPT

## 2024-10-04 NOTE — PROGRESS NOTES
"Daily Note     Today's date: 10/4/2024  Patient name: Shiva Shabazz  : 1955  MRN: [id number]  Referring provider: Krunal Aponte PA-C  Dx:   Encounter Diagnosis     ICD-10-CM    1. Balance disorder  R26.89       2. Gait instability  R26.81       3. Primary osteoarthritis involving multiple joints  M15.0         Start Time: 0845  Stop Time: 0930  Total time in clinic (min): 45 minutes    Subjective: She reports she is doing well and felt great after her session yesterday.      Objective: See treatment diary below    3# ankle weights bilaterally  - High knee march with 3 sec hold, 3 laps at long bar with 1 finger support  - Forward/backward with 10# tidal tank 40'x2 each way  - Hurdles forward/lateral 3 laps each with 1 UE support and 3# ankle weights bilaterally  - Sit to stand 2x10 with 10# tidal tank  - Semi-tandem amb 4 laps at long bar  - Eccentric lower lateral on 6\" step, x10 each side  - Lateral on foam with 1 UE support, 3 laps  - Lateral on foam with no UE support, 3 laps    Assessment: Prior injuries have caused muscle mass loss in her legs and this makes it more difficult to balance and react to disturbances in balance. She was able to decrease UE support at times, but she still reaches for support appropriately. Continue to focus on LE strength and balance in narrow base of support conditions and in single leg stance conditions. Continue skilled PT to maximize her safety and function.      Plan: Continue per plan of care.      POC expires Unit limit Auth Expiration date PT/OT + Visit Limit? Co-Insurance    N/a pend BOMN Yes                               Visit/Unit Tracking  AUTH Status:  Date 10/2 10/3 10/4            pend Used                Remaining                         "

## 2024-10-07 ENCOUNTER — OFFICE VISIT (OUTPATIENT)
Age: 69
End: 2024-10-07
Payer: MEDICARE

## 2024-10-07 DIAGNOSIS — R26.89 BALANCE DISORDER: Primary | ICD-10-CM

## 2024-10-07 DIAGNOSIS — R26.81 GAIT INSTABILITY: ICD-10-CM

## 2024-10-07 PROCEDURE — 97112 NEUROMUSCULAR REEDUCATION: CPT | Performed by: PHYSICAL THERAPIST

## 2024-10-07 NOTE — PROGRESS NOTES
"Daily Note     Today's date: 10/7/2024  Patient name: Shiva Shabazz  : 1955  MRN: [id number]  Referring provider: Krunal Aponte PA-C  Dx:   Encounter Diagnosis     ICD-10-CM    1. Balance disorder  R26.89       2. Gait instability  R26.81                    Subjective: She reports she is doing well and felt great after her session yesterday.      Objective: See treatment diary below    3# ankle weights bilaterally  - High knee march with 3 sec hold, 3 laps at long bar with 1 finger support  - Hurdles forward/lateral 3 laps each with 1 UE support and 3# ankle weights bilaterally  - Forward/backward with 10# tidal tank 40'x2 each way  - Sit to stand 2x10 with 10# tidal tank  - Semi-tandem amb 4 laps at long bar  - Eccentric lowering from 6\" step FWD ( quad ) 20 reps each side   - Eccentric lower lateral on 6\" step, x10 each side  - Lateral on foam with no UE support, 3 laps    Assessment: Prior injuries have caused muscle mass loss in her legs and this makes it more difficult to balance and react to disturbances in balance. Good response to glut med contraction, however knee pain does affect mobility at times.  She was able to decrease UE support as able. Addition of eccentric step downs due to patient report challenge with stairs at home.  Continue skilled PT to maximize her safety and function.      Plan: Continue per plan of care.      POC expires Unit limit Auth Expiration date PT/OT + Visit Limit? Co-Insurance    N/a pend BOMN Yes                               Visit/Unit Tracking  AUTH Status:  Date 10/2 10/3 10/4 10/7           pend Used                Remaining                         "

## 2024-10-09 ENCOUNTER — OFFICE VISIT (OUTPATIENT)
Age: 69
End: 2024-10-09
Payer: MEDICARE

## 2024-10-09 DIAGNOSIS — R26.89 BALANCE DISORDER: Primary | ICD-10-CM

## 2024-10-09 DIAGNOSIS — M15.0 PRIMARY OSTEOARTHRITIS INVOLVING MULTIPLE JOINTS: ICD-10-CM

## 2024-10-09 DIAGNOSIS — R26.81 GAIT INSTABILITY: ICD-10-CM

## 2024-10-09 PROCEDURE — 97112 NEUROMUSCULAR REEDUCATION: CPT

## 2024-10-09 NOTE — PROGRESS NOTES
"Daily Note     Today's date: 10/9/2024  Patient name: Shiva Shabazz  : 1955  MRN: [id number]  Referring provider: Krunal Aponte PA-C  Dx:   Encounter Diagnosis     ICD-10-CM    1. Balance disorder  R26.89       2. Gait instability  R26.81       3. Primary osteoarthritis involving multiple joints  M15.0                    Subjective: She reports feeling good      Objective: See treatment diary below    3# ankle weights bilaterally  - High knee march with 3 sec hold, 3 laps at long bar with 1 finger support  - Hurdles forward/lateral 3 laps each with 1 UE support and 3# ankle weights bilaterally  - Forward/backward with 10# tidal tank 40'x2 each way  - Sit to stand 2x10 with 10# tidal tank  - tandem amb 4 laps at long bar  - Eccentric lowering from 6\" step FWD ( quad ) 20 reps each side   - Eccentric lower lateral on 6\" step, x10 each side  - Lateral on foam with no UE support, 3 laps    Assessment: patient tolerated treatment well. Patient did have increased difficulty performing step ups due to patients hip weakness. Patient was able to perform most exercises without UE assist. Patient did experience knee pain throughout session.Continue skilled PT to maximize her safety and function.      Plan: Continue per plan of care.      POC expires Unit limit Auth Expiration date PT/OT + Visit Limit? Co-Insurance    N/a pend BOMN Yes                               Visit/Unit Tracking  AUTH Status:  Date 10/2 10/3 10/4 10/7           pend Used                Remaining                         "

## 2024-10-10 ENCOUNTER — OFFICE VISIT (OUTPATIENT)
Age: 69
End: 2024-10-10
Payer: MEDICARE

## 2024-10-10 DIAGNOSIS — M15.0 PRIMARY OSTEOARTHRITIS INVOLVING MULTIPLE JOINTS: ICD-10-CM

## 2024-10-10 DIAGNOSIS — R26.81 GAIT INSTABILITY: ICD-10-CM

## 2024-10-10 DIAGNOSIS — R26.89 BALANCE DISORDER: Primary | ICD-10-CM

## 2024-10-10 PROCEDURE — 97112 NEUROMUSCULAR REEDUCATION: CPT

## 2024-10-10 NOTE — PROGRESS NOTES
"Daily Note     Today's date: 10/10/2024  Patient name: Shiva Shabazz  : 1955  MRN: [id number]  Referring provider: Krunal Aponte PA-C  Dx:   Encounter Diagnosis     ICD-10-CM    1. Balance disorder  R26.89       2. Gait instability  R26.81       3. Primary osteoarthritis involving multiple joints  M15.0                    Subjective: She reports feeling good      Objective: See treatment diary below    3# ankle weights bilaterally  - High knee march with 3 sec hold, 3 laps at long bar with 1 finger support  - Hurdles forward/lateral 3 laps each with 1 UE support and 3# ankle weights bilaterally  - Forward/backward with 10# tidal tank 40'x2 each way  - Sit to stand 2x10 with 10# tidal tank  - tandem amb 4 laps at long bar  - Eccentric lowering from 6\" step FWD ( quad ) 20 reps each side   - Eccentric lower lateral on 6\" step, x10 each side  - Lateral on foam with no UE support, 3 laps    Assessment: patient tolerated treatment well. Patient continues to experience right proximal hip weakness with step ups. Patient does well with hurdles initially but after performing 3 laps she has increased difficulty clearing hurdles.Continue to progress as able. Patient would continue to benefit from skilled PT to maximize her safety and function.      Plan: Continue per plan of care.      POC expires Unit limit Auth Expiration date PT/OT + Visit Limit? Co-Insurance    N/a pend BOMN Yes                               Visit/Unit Tracking  AUTH Status:  Date 10/2 10/3 10/4 10/7           pend Used                Remaining                         "

## 2024-10-15 ENCOUNTER — OFFICE VISIT (OUTPATIENT)
Age: 69
End: 2024-10-15
Payer: MEDICARE

## 2024-10-15 DIAGNOSIS — M15.0 PRIMARY OSTEOARTHRITIS INVOLVING MULTIPLE JOINTS: ICD-10-CM

## 2024-10-15 DIAGNOSIS — R26.89 BALANCE DISORDER: Primary | ICD-10-CM

## 2024-10-15 DIAGNOSIS — R26.81 GAIT INSTABILITY: ICD-10-CM

## 2024-10-15 PROCEDURE — 97110 THERAPEUTIC EXERCISES: CPT

## 2024-10-15 PROCEDURE — 97112 NEUROMUSCULAR REEDUCATION: CPT

## 2024-10-15 NOTE — PROGRESS NOTES
"Daily Note     Today's date: 10/15/2024  Patient name: Shiva Shabazz  : 1955  MRN: [id number]  Referring provider: Krunal Aponte PA-C  Dx:   Encounter Diagnosis     ICD-10-CM    1. Balance disorder  R26.89       2. Gait instability  R26.81       3. Primary osteoarthritis involving multiple joints  M15.0                    Subjective: She interested in contacting orthopaedic due to significant R knee pain.       Objective: See treatment diary below    3# ankle weights bilaterally  - High knee march with 3 sec hold, 4 laps with 1 finger support  - tandem amb forward / backwards 4 laps   - Sit to stand 2x12 with 10# tidal tank  - Hurdles forward/lateral 3 laps each with 1 UE support   - Step ups 6\" curb step, 2 x 10 reps each side   - Eccentric lower lateral on 6\" step, 2 x 10 each side  - Lateral on foam with no UE support, 4 laps  - Stool taps, intermittent assist, 15 reps/ea    VC+TC for glute med cx to reduce trendelenburg  - Forward / backward amb with 10# tidal tank hold 4 laps    Assessment: patient tolerated treatment well. Patient continues to experience right proximal hip weakness with step ups. Significant Trendelenburg, resulting in compensatory trunk lateral flexion when in SL stance. Patient does well with hurdles initially but after performing 3 laps she has increased difficulty clearing hurdles. Continue to progress as able. Patient would continue to benefit from skilled PT to maximize her safety and function.      Plan: Continue per plan of care.      POC expires Unit limit Auth Expiration date PT/OT + Visit Limit? Co-Insurance    N/a pend BOMN Yes                               Visit/Unit Tracking  AUTH Status:  Date 10/2 10/3 10/4 10/7 10/15          pend Used                Remaining                         "

## 2024-10-17 ENCOUNTER — OFFICE VISIT (OUTPATIENT)
Dept: FAMILY MEDICINE CLINIC | Facility: CLINIC | Age: 69
End: 2024-10-17
Payer: MEDICARE

## 2024-10-17 ENCOUNTER — APPOINTMENT (OUTPATIENT)
Dept: RADIOLOGY | Facility: CLINIC | Age: 69
End: 2024-10-17
Payer: MEDICARE

## 2024-10-17 VITALS
WEIGHT: 173.5 LBS | HEIGHT: 66 IN | HEART RATE: 74 BPM | OXYGEN SATURATION: 98 % | TEMPERATURE: 97.8 F | BODY MASS INDEX: 27.88 KG/M2 | DIASTOLIC BLOOD PRESSURE: 82 MMHG | SYSTOLIC BLOOD PRESSURE: 130 MMHG

## 2024-10-17 DIAGNOSIS — R49.0 HOARSENESS, CHRONIC: ICD-10-CM

## 2024-10-17 DIAGNOSIS — T30.0 BURN: Primary | ICD-10-CM

## 2024-10-17 DIAGNOSIS — M25.561 CHRONIC PAIN OF BOTH KNEES: ICD-10-CM

## 2024-10-17 DIAGNOSIS — M25.562 CHRONIC PAIN OF BOTH KNEES: ICD-10-CM

## 2024-10-17 DIAGNOSIS — G89.29 CHRONIC PAIN OF BOTH KNEES: ICD-10-CM

## 2024-10-17 DIAGNOSIS — B35.3 TINEA PEDIS OF BOTH FEET: ICD-10-CM

## 2024-10-17 PROCEDURE — 73562 X-RAY EXAM OF KNEE 3: CPT

## 2024-10-17 PROCEDURE — 99214 OFFICE O/P EST MOD 30 MIN: CPT | Performed by: NURSE PRACTITIONER

## 2024-10-17 PROCEDURE — G2211 COMPLEX E/M VISIT ADD ON: HCPCS | Performed by: NURSE PRACTITIONER

## 2024-10-17 RX ORDER — CEPHALEXIN 500 MG/1
500 CAPSULE ORAL 3 TIMES DAILY
COMMUNITY
Start: 2024-10-08 | End: 2024-10-18

## 2024-10-17 RX ORDER — CLOTRIMAZOLE AND BETAMETHASONE DIPROPIONATE 10; .64 MG/G; MG/G
CREAM TOPICAL 2 TIMES DAILY
Qty: 45 G | Refills: 0 | Status: SHIPPED | OUTPATIENT
Start: 2024-10-17 | End: 2024-10-27

## 2024-10-17 RX ORDER — SILVER SULFADIAZINE 10 MG/G
CREAM TOPICAL DAILY
Qty: 25 G | Refills: 0 | Status: SHIPPED | OUTPATIENT
Start: 2024-10-17 | End: 2024-10-27
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
## 2024-10-17 NOTE — ASSESSMENT & PLAN NOTE
Orders:    clotrimazole-betamethasone (LOTRISONE) 1-0.05 % cream; Apply topically 2 (two) times a day for 10 days
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
## 2024-10-17 NOTE — ASSESSMENT & PLAN NOTE
Ordered imaging of the knees and referral placed orthopedics also has had injection in the past and was not helpful     Orders:    XR knee 3 vw left non injury; Future    XR knee 3 vw right non injury; Future    Ambulatory Referral to Orthopedic Surgery; Future

## 2024-10-17 NOTE — ASSESSMENT & PLAN NOTE
Has seen ENT last week and had a scope and was placed on ABX and is following up next month to review and discussed if not finding anything to f/u here to review may be a silent reflux

## 2024-10-18 ENCOUNTER — APPOINTMENT (OUTPATIENT)
Age: 69
End: 2024-10-18
Payer: MEDICARE

## 2024-10-23 ENCOUNTER — APPOINTMENT (OUTPATIENT)
Age: 69
End: 2024-10-23
Payer: MEDICARE

## 2024-10-25 ENCOUNTER — APPOINTMENT (OUTPATIENT)
Age: 69
End: 2024-10-25
Payer: MEDICARE

## 2024-10-29 ENCOUNTER — APPOINTMENT (OUTPATIENT)
Age: 69
End: 2024-10-29
Payer: MEDICARE

## 2024-11-13 DIAGNOSIS — G60.9 IDIOPATHIC PERIPHERAL NEUROPATHY: ICD-10-CM

## 2024-11-14 RX ORDER — GABAPENTIN 600 MG/1
600 TABLET ORAL 2 TIMES DAILY
Qty: 180 TABLET | Refills: 1 | Status: SHIPPED | OUTPATIENT
Start: 2024-11-14

## 2024-12-13 ENCOUNTER — OFFICE VISIT (OUTPATIENT)
Dept: OBGYN CLINIC | Facility: MEDICAL CENTER | Age: 69
End: 2024-12-13
Payer: MEDICARE

## 2024-12-13 VITALS
SYSTOLIC BLOOD PRESSURE: 135 MMHG | BODY MASS INDEX: 28.22 KG/M2 | HEIGHT: 66 IN | HEART RATE: 75 BPM | DIASTOLIC BLOOD PRESSURE: 74 MMHG | WEIGHT: 175.6 LBS

## 2024-12-13 DIAGNOSIS — Z51.81 ANTICOAGULATION MANAGEMENT ENCOUNTER: ICD-10-CM

## 2024-12-13 DIAGNOSIS — M25.561 CHRONIC PAIN OF BOTH KNEES: ICD-10-CM

## 2024-12-13 DIAGNOSIS — M25.562 CHRONIC PAIN OF BOTH KNEES: ICD-10-CM

## 2024-12-13 DIAGNOSIS — G89.29 CHRONIC PAIN OF BOTH KNEES: ICD-10-CM

## 2024-12-13 DIAGNOSIS — M17.11 PRIMARY OSTEOARTHRITIS OF RIGHT KNEE: Primary | ICD-10-CM

## 2024-12-13 DIAGNOSIS — Z79.01 ANTICOAGULATION MANAGEMENT ENCOUNTER: ICD-10-CM

## 2024-12-13 DIAGNOSIS — M17.12 PRIMARY OSTEOARTHRITIS OF LEFT KNEE: ICD-10-CM

## 2024-12-13 PROCEDURE — 99204 OFFICE O/P NEW MOD 45 MIN: CPT | Performed by: ORTHOPAEDIC SURGERY

## 2024-12-13 RX ORDER — CEFAZOLIN SODIUM 2 G/50ML
2000 SOLUTION INTRAVENOUS ONCE
OUTPATIENT
Start: 2024-12-13 | End: 2024-12-13

## 2024-12-13 RX ORDER — ASCORBIC ACID 500 MG
500 TABLET ORAL DAILY
Qty: 30 TABLET | Refills: 0 | Status: SHIPPED | OUTPATIENT
Start: 2024-12-13

## 2024-12-13 RX ORDER — FERROUS SULFATE 324(65)MG
324 TABLET, DELAYED RELEASE (ENTERIC COATED) ORAL
Qty: 60 TABLET | Refills: 0 | Status: SHIPPED | OUTPATIENT
Start: 2024-12-13

## 2024-12-13 RX ORDER — SODIUM CHLORIDE, SODIUM LACTATE, POTASSIUM CHLORIDE, CALCIUM CHLORIDE 600; 310; 30; 20 MG/100ML; MG/100ML; MG/100ML; MG/100ML
125 INJECTION, SOLUTION INTRAVENOUS CONTINUOUS
OUTPATIENT
Start: 2024-12-13

## 2024-12-13 RX ORDER — MUPIROCIN 20 MG/G
OINTMENT TOPICAL 3 TIMES DAILY
Qty: 15 G | Refills: 0 | Status: SHIPPED | OUTPATIENT
Start: 2024-12-13

## 2024-12-13 RX ORDER — ENOXAPARIN SODIUM 100 MG/ML
40 INJECTION SUBCUTANEOUS DAILY
Qty: 11.2 ML | Refills: 0 | Status: SHIPPED | OUTPATIENT
Start: 2024-12-13 | End: 2025-01-10

## 2024-12-13 RX ORDER — TRANEXAMIC ACID 10 MG/ML
1000 INJECTION, SOLUTION INTRAVENOUS ONCE
OUTPATIENT
Start: 2024-12-13 | End: 2024-12-13

## 2024-12-13 RX ORDER — CHLORHEXIDINE GLUCONATE ORAL RINSE 1.2 MG/ML
15 SOLUTION DENTAL ONCE
OUTPATIENT
Start: 2024-12-13 | End: 2024-12-13

## 2024-12-13 RX ORDER — FOLIC ACID 1 MG/1
1 TABLET ORAL DAILY
Qty: 30 TABLET | Refills: 0 | Status: SHIPPED | OUTPATIENT
Start: 2024-12-13
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
## 2024-12-20 ENCOUNTER — OFFICE VISIT (OUTPATIENT)
Dept: FAMILY MEDICINE CLINIC | Facility: CLINIC | Age: 69
End: 2024-12-20
Payer: MEDICARE

## 2024-12-20 ENCOUNTER — TELEPHONE (OUTPATIENT)
Age: 69
End: 2024-12-20

## 2024-12-20 VITALS
OXYGEN SATURATION: 97 % | HEIGHT: 66 IN | SYSTOLIC BLOOD PRESSURE: 132 MMHG | HEART RATE: 92 BPM | DIASTOLIC BLOOD PRESSURE: 80 MMHG | WEIGHT: 175.2 LBS | BODY MASS INDEX: 28.16 KG/M2 | TEMPERATURE: 97.8 F

## 2024-12-20 DIAGNOSIS — R49.0 HOARSENESS, CHRONIC: Primary | ICD-10-CM

## 2024-12-20 DIAGNOSIS — Z01.818 PRE-OP TESTING: Primary | ICD-10-CM

## 2024-12-20 PROCEDURE — 99213 OFFICE O/P EST LOW 20 MIN: CPT | Performed by: NURSE PRACTITIONER

## 2024-12-20 PROCEDURE — G2211 COMPLEX E/M VISIT ADD ON: HCPCS | Performed by: NURSE PRACTITIONER

## 2024-12-20 NOTE — TELEPHONE ENCOUNTER
Spoke with patient and advised her on  that is okay to cancel her appointment for February and she will see her in January.

## 2024-12-20 NOTE — PROGRESS NOTES
Name: Shiva Shabazz      : 1955      MRN: [id number]  Encounter Provider: VALARIE Howe  Encounter Date: 2024   Encounter department: [facility]  :  Assessment & Plan  Hoarseness, chronic  Patient with chronic hoarseness and has been going on for the past six months and has been to ENT in the past and had been given famotidine and was not effective. She has also tried the allergy medications and not working for her. Will refer to ENT for second opinion and also will update her thyroid labs is s/p parathyroid gland removal in remote past.   Orders:  •  Ambulatory Referral to Otolaryngology; Future  •  Comprehensive metabolic panel; Future  •  TSH, 3rd generation with Free T4 reflex; Future  •  CBC and differential; Future           History of Present Illness     Patient there today and reports that she is having issues with hoarseness in her voice and present for a while and went to her ENT twice and saw Dr. Abreu assistant and was prescribed famotidine and was not able to tolerate taking. Patient denies any sinus congestion No hearing issues or problems with her hearing. Patient also is not having any heart burn and denies any issues with that. No issues with swallowing or food getting stuck. No complaints of pain in the neck but is feeling lie something is in her throat. She is doing lots of throat clearing and not clearing. No smoking history Has tried some some seasonal allergy medication and was not helpful Patient feeling like her voice gets weaker as the day goes on but no weak swallow as the day goes on.       Review of Systems   Constitutional: Negative.    HENT:  Positive for voice change. Negative for congestion, dental problem, drooling, ear discharge, ear pain, facial swelling, hearing loss, mouth sores, nosebleeds, postnasal drip, rhinorrhea, sinus pressure, sinus pain, sneezing, sore throat, tinnitus and trouble swallowing.    Eyes: Negative.   "  Respiratory: Negative.     Cardiovascular: Negative.    Gastrointestinal: Negative.    Skin: Negative.    Neurological: Negative.    Psychiatric/Behavioral: Negative.         Objective   /80 (BP Location: Left arm, Patient Position: Sitting)   Pulse 92   Temp 97.8 °F (36.6 °C) (Temporal)   Ht 5' 6\" (1.676 m)   Wt 79.5 kg (175 lb 3.2 oz)   LMP  (LMP Unknown)   SpO2 97%   BMI 28.28 kg/m²      Physical Exam  Vitals and nursing note reviewed.   Constitutional:       General: She is not in acute distress.     Appearance: She is well-developed.   HENT:      Head: Normocephalic and atraumatic.      Right Ear: Tympanic membrane, ear canal and external ear normal. There is no impacted cerumen.      Left Ear: Tympanic membrane, ear canal and external ear normal. There is no impacted cerumen.      Nose: Nose normal.      Mouth/Throat:      Mouth: Mucous membranes are moist.   Eyes:      Pupils: Pupils are equal, round, and reactive to light.   Neck:      Thyroid: No thyromegaly.   Cardiovascular:      Rate and Rhythm: Normal rate and regular rhythm.      Heart sounds: Normal heart sounds. No murmur heard.  Pulmonary:      Effort: Pulmonary effort is normal. No respiratory distress.      Breath sounds: Normal breath sounds. No wheezing.   Abdominal:      General: Bowel sounds are normal.      Palpations: Abdomen is soft.   Skin:     General: Skin is warm and dry.   Neurological:      Mental Status: She is alert and oriented to person, place, and time.         "

## 2024-12-20 NOTE — ASSESSMENT & PLAN NOTE
Patient with chronic hoarseness and has been going on for the past six months and has been to ENT in the past and had been given famotidine and was not effective. She has also tried the allergy medications and not working for her. Will refer to ENT for second opinion and also will update her thyroid labs is s/p parathyroid gland removal in remote past.   Orders:  •  Ambulatory Referral to Otolaryngology; Future  •  Comprehensive metabolic panel; Future  •  TSH, 3rd generation with Free T4 reflex; Future  •  CBC and differential; Future

## 2024-12-20 NOTE — TELEPHONE ENCOUNTER
Patient was originally scheduled to see Dr. Moore on 2/24/2025. Patient is scheduled for surgery on 2/10/2025 and was advised to get a rheumatology pre-op clearance. Patient is still scheduled for 2/24/2025 but I also scheduled her for 1/24/2025 with Dr. Moore. Patient will cancel the 2/24/2025 if Dr. Moore recommends doing so.      Procedure: ARTHROPLASTY KNEE TOTAL W ROBOT (Right: Knee)  Anesthesia type: Choice  Diagnosis: Primary osteoarthritis of right knee [M17.11]  Pre-op diagnosis: Primary osteoarthritis of right knee [M17.11]  Location: WE OR ROOM 04 / [facility]  Surgeons: Deep Murdock MD

## 2024-12-23 ENCOUNTER — RESULTS FOLLOW-UP (OUTPATIENT)
Dept: FAMILY MEDICINE CLINIC | Facility: CLINIC | Age: 69
End: 2024-12-23

## 2024-12-23 ENCOUNTER — TELEPHONE (OUTPATIENT)
Age: 69
End: 2024-12-23

## 2024-12-23 ENCOUNTER — APPOINTMENT (OUTPATIENT)
Dept: LAB | Facility: HOSPITAL | Age: 69
End: 2024-12-23
Payer: MEDICARE

## 2024-12-23 DIAGNOSIS — R49.0 HOARSENESS, CHRONIC: ICD-10-CM

## 2024-12-23 LAB
ALBUMIN SERPL BCG-MCNC: 4.2 G/DL (ref 3.5–5)
ALP SERPL-CCNC: 59 U/L (ref 34–104)
ALT SERPL W P-5'-P-CCNC: 18 U/L (ref 7–52)
ANION GAP SERPL CALCULATED.3IONS-SCNC: 6 MMOL/L (ref 4–13)
AST SERPL W P-5'-P-CCNC: 16 U/L (ref 13–39)
BASOPHILS # BLD AUTO: 0.04 THOUSANDS/ÂΜL (ref 0–0.1)
BASOPHILS NFR BLD AUTO: 1 % (ref 0–1)
BILIRUB SERPL-MCNC: 0.58 MG/DL (ref 0.2–1)
BUN SERPL-MCNC: 21 MG/DL (ref 5–25)
CALCIUM SERPL-MCNC: 9.7 MG/DL (ref 8.4–10.2)
CHLORIDE SERPL-SCNC: 106 MMOL/L (ref 96–108)
CO2 SERPL-SCNC: 29 MMOL/L (ref 21–32)
CREAT SERPL-MCNC: 0.73 MG/DL (ref 0.6–1.3)
EOSINOPHIL # BLD AUTO: 0.18 THOUSAND/ÂΜL (ref 0–0.61)
EOSINOPHIL NFR BLD AUTO: 4 % (ref 0–6)
ERYTHROCYTE [DISTWIDTH] IN BLOOD BY AUTOMATED COUNT: 13.6 % (ref 11.6–15.1)
GFR SERPL CREATININE-BSD FRML MDRD: 84 ML/MIN/1.73SQ M
GLUCOSE P FAST SERPL-MCNC: 88 MG/DL (ref 65–99)
HCT VFR BLD AUTO: 38.1 % (ref 34.8–46.1)
HGB BLD-MCNC: 12.3 G/DL (ref 11.5–15.4)
IMM GRANULOCYTES # BLD AUTO: 0.01 THOUSAND/UL (ref 0–0.2)
IMM GRANULOCYTES NFR BLD AUTO: 0 % (ref 0–2)
LYMPHOCYTES # BLD AUTO: 1.14 THOUSANDS/ÂΜL (ref 0.6–4.47)
LYMPHOCYTES NFR BLD AUTO: 24 % (ref 14–44)
MCH RBC QN AUTO: 30.1 PG (ref 26.8–34.3)
MCHC RBC AUTO-ENTMCNC: 32.3 G/DL (ref 31.4–37.4)
MCV RBC AUTO: 93 FL (ref 82–98)
MONOCYTES # BLD AUTO: 0.38 THOUSAND/ÂΜL (ref 0.17–1.22)
MONOCYTES NFR BLD AUTO: 8 % (ref 4–12)
NEUTROPHILS # BLD AUTO: 3 THOUSANDS/ÂΜL (ref 1.85–7.62)
NEUTS SEG NFR BLD AUTO: 63 % (ref 43–75)
NRBC BLD AUTO-RTO: 0 /100 WBCS
PLATELET # BLD AUTO: 330 THOUSANDS/UL (ref 149–390)
PMV BLD AUTO: 10.9 FL (ref 8.9–12.7)
POTASSIUM SERPL-SCNC: 4.2 MMOL/L (ref 3.5–5.3)
PROT SERPL-MCNC: 7.6 G/DL (ref 6.4–8.4)
RBC # BLD AUTO: 4.08 MILLION/UL (ref 3.81–5.12)
SODIUM SERPL-SCNC: 141 MMOL/L (ref 135–147)
TSH SERPL DL<=0.05 MIU/L-ACNC: 2.65 UIU/ML (ref 0.45–4.5)
WBC # BLD AUTO: 4.75 THOUSAND/UL (ref 4.31–10.16)

## 2024-12-23 PROCEDURE — 84443 ASSAY THYROID STIM HORMONE: CPT

## 2024-12-23 PROCEDURE — 36415 COLL VENOUS BLD VENIPUNCTURE: CPT

## 2024-12-23 PROCEDURE — 80053 COMPREHEN METABOLIC PANEL: CPT

## 2024-12-23 PROCEDURE — 85025 COMPLETE CBC W/AUTO DIFF WBC: CPT

## 2024-12-26 ENCOUNTER — NURSE TRIAGE (OUTPATIENT)
Age: 69
End: 2024-12-26

## 2024-12-26 NOTE — TELEPHONE ENCOUNTER
Patient called the RX Refill Line. Message is being forwarded to the office.     Patient is requesting an antibiotic from Katharina Negrete. She woke up feeling like she has a sinus infection. Her symptoms were sent in a message via Call Hub. Patient asked that the message also be sent to Katharina Negrete. She is concerned because she doesn't want anything to interfere with her scheduled knee replacement surgery on 02/10/25

## 2024-12-26 NOTE — TELEPHONE ENCOUNTER
Regarding: stuffy nose, post nasal drip, headache, light headed  ----- Message from Pearl LARES sent at 12/26/2024 10:49 AM EST -----  Patient said that she woke up during the night with a stuffy nose. She also has post nasal drip and a headache. She is feeling light headed. Patient is requesting an antibiotic. She is concerned because she has knee replacement surgery scheduled 02/10/24 and she wants to be sure that she is in good health for that.    Call back phone #[phone]

## 2024-12-26 NOTE — TELEPHONE ENCOUNTER
"Patient woke up with sinus congestion. Requesting antibiotics. Agreeable to office visit. Scheduled for tomorrow.    -------    Reason for Disposition   Sinus congestion as part of a cold, present < 10 days    Answer Assessment - Initial Assessment Questions  1. LOCATION: \"Where does it hurt?\"       Sinus pressure  2. ONSET: \"When did the sinus pain start?\"  (e.g., hours, days)       Overnight    3. SEVERITY: \"How bad is the pain?\"   (Scale 0-10; or none, mild, moderate or severe)      Mild    4. RECURRENT SYMPTOM: \"Have you ever had sinus problems before?\" If Yes, ask: \"When was the last time?\" and \"What happened that time?\"       Patient states she has occasional sinus infections and this feels similar.    5. NASAL CONGESTION: \"Is the nose blocked?\" If Yes, ask: \"Can you open it or must you breathe through your mouth?\"      Not fully blocked.    6. NASAL DISCHARGE: \"Do you have discharge from your nose?\" If so ask, \"What color?\"      Unspecified    7. FEVER: \"Do you have a fever?\" If Yes, ask: \"What is it, how was it measured, and when did it start?\"       Denies    8. OTHER SYMPTOMS: \"Do you have any other symptoms?\" (e.g., sore throat, cough, earache, difficulty breathing)      Post nasal drip, slight headache. Patient states she is not dizzy when walking and does not impact her ability to ambulate.    Protocols used: Sinus Pain or Congestion-Adult-[place]    "

## 2024-12-27 ENCOUNTER — OFFICE VISIT (OUTPATIENT)
Dept: FAMILY MEDICINE CLINIC | Facility: CLINIC | Age: 69
End: 2024-12-27
Payer: MEDICARE

## 2024-12-27 VITALS
DIASTOLIC BLOOD PRESSURE: 74 MMHG | TEMPERATURE: 98.6 F | BODY MASS INDEX: 28.12 KG/M2 | HEIGHT: 66 IN | HEART RATE: 70 BPM | SYSTOLIC BLOOD PRESSURE: 114 MMHG | OXYGEN SATURATION: 97 % | WEIGHT: 175 LBS

## 2024-12-27 DIAGNOSIS — J01.00 ACUTE MAXILLARY SINUSITIS, RECURRENCE NOT SPECIFIED: Primary | ICD-10-CM

## 2024-12-27 PROCEDURE — 99213 OFFICE O/P EST LOW 20 MIN: CPT

## 2024-12-27 PROCEDURE — G2211 COMPLEX E/M VISIT ADD ON: HCPCS

## 2024-12-27 RX ORDER — FLUTICASONE PROPIONATE 50 MCG
2 SPRAY, SUSPENSION (ML) NASAL DAILY
COMMUNITY
Start: 2024-10-08

## 2024-12-27 RX ORDER — AZELASTINE 1 MG/ML
SPRAY, METERED NASAL
COMMUNITY
Start: 2024-11-04
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
## 2024-12-31 ENCOUNTER — OFFICE VISIT (OUTPATIENT)
Age: 69
End: 2024-12-31
Payer: MEDICARE

## 2024-12-31 VITALS
HEIGHT: 66 IN | WEIGHT: 175 LBS | DIASTOLIC BLOOD PRESSURE: 82 MMHG | HEART RATE: 78 BPM | OXYGEN SATURATION: 97 % | BODY MASS INDEX: 28.12 KG/M2 | TEMPERATURE: 98.2 F | SYSTOLIC BLOOD PRESSURE: 122 MMHG

## 2024-12-31 DIAGNOSIS — Z13.89 SCREENING FOR SKIN CONDITION: Primary | ICD-10-CM

## 2024-12-31 DIAGNOSIS — D22.9 MULTIPLE MELANOCYTIC NEVI: ICD-10-CM

## 2024-12-31 DIAGNOSIS — B35.3 TINEA PEDIS OF BOTH FEET: ICD-10-CM

## 2024-12-31 DIAGNOSIS — L82.1 SEBORRHEIC KERATOSIS: ICD-10-CM

## 2024-12-31 DIAGNOSIS — D18.01 CHERRY ANGIOMA: ICD-10-CM

## 2024-12-31 DIAGNOSIS — L24.9 IRRITANT CONTACT DERMATITIS, UNSPECIFIED TRIGGER: ICD-10-CM

## 2024-12-31 PROCEDURE — 99214 OFFICE O/P EST MOD 30 MIN: CPT | Performed by: STUDENT IN AN ORGANIZED HEALTH CARE EDUCATION/TRAINING PROGRAM

## 2024-12-31 RX ORDER — CICLOPIROX OLAMINE 7.7 MG/G
CREAM TOPICAL 2 TIMES DAILY
Qty: 30 G | Refills: 2 | Status: SHIPPED | OUTPATIENT
Start: 2024-12-31

## 2024-12-31 RX ORDER — TRIAMCINOLONE ACETONIDE 1 MG/G
OINTMENT TOPICAL 2 TIMES DAILY
Qty: 30 G | Refills: 1 | Status: SHIPPED | OUTPATIENT
Start: 2024-12-31

## 2024-12-31 NOTE — PROGRESS NOTES
"[facility] Note     Patient Name: Shiva Shabazz  Encounter Date: December 31, 2024     Have you been cared for by a [facility]ologist in the last 3 years and, if so, which description applies to you?    Yes.  I have been here within the last 3 years, and my medical history has NOT changed since that time.  I am FEMALE/of child-bearing potential.    REVIEW OF SYSTEMS:  Have you recently had or currently have any of the following? No changes in my recent health.   PAST MEDICAL HISTORY:  Have you personally ever had or currently have any of the following?  If \"YES,\" then please provide more detail. No changes in my medical history.   HISTORY OF IMMUNOSUPPRESSION: Do you have a history of any of the following:  Systemic Immunosuppression such as Diabetes, Biologic or Immunotherapy, Chemotherapy, Organ Transplantation, Bone Marrow Transplantation or Prednisone?  No     Answering \"YES\" requires the addition of the dotphrase \"IMMUNOSUPPRESSED\" as the first diagnosis of the patient's visit.   FAMILY HISTORY:  Any \"first degree relatives\" (parent, brother, sister, or child) with the following?    No changes in my family's known health.   PATIENT EXPERIENCE:    Do you want the Dermatologist to perform a COMPLETE skin exam today including a clinical examination under the \"bra and underwear\" areas?  YES  If necessary, do we have your permission to call and leave a detailed message on your Preferred Phone number that includes your specific medical information?  Yes      Allergies   Allergen Reactions   • Cephalexin Anaphylaxis   • Latex      Added based on information entered during case entry, please review and add reactions, type, and severity as needed      Current Outpatient Medications:   •  amoxicillin-clavulanate (AUGMENTIN) 875-125 mg per tablet, Take 1 tablet by mouth every 12 (twelve) hours for 5 days Take 2x daily for 7 days. Take with food., Disp: 10 tablet, Rfl: 0  •  ascorbic acid " (VITAMIN C) 500 MG tablet, Take 1 tablet (500 mg total) by mouth daily Start 30 days prior to surgery, Disp: 30 tablet, Rfl: 0  •  azelastine (ASTELIN) 0.1 % nasal spray, , Disp: , Rfl:   •  B Complex Vitamins (B COMPLEX 1 PO), Take by mouth, Disp: , Rfl:   •  cholecalciferol (VITAMIN D3) 1,000 units tablet, Take 1,000 Units by mouth daily, Disp: , Rfl:   •  ciclopirox (LOPROX) 0.77 % cream, Apply topically 2 (two) times a day To the feet, Disp: 30 g, Rfl: 2  •  cyanocobalamin (VITAMIN B-12) 100 MCG tablet, Take 100 mcg by mouth daily, Disp: , Rfl:   •  fluticasone (FLONASE) 50 mcg/act nasal spray, 2 sprays into each nostril daily, Disp: , Rfl:   •  folic acid (FOLVITE) 1 mg tablet, Take 1 tablet (1 mg total) by mouth daily Start 30 days prior to surgery, Disp: 30 tablet, Rfl: 0  •  gabapentin (NEURONTIN) 600 MG tablet, TAKE ONE TABLET BY MOUTH TWICE A DAY, Disp: 180 tablet, Rfl: 1  •  hydroCHLOROthiazide 25 mg tablet, Take 1 tablet (25 mg total) by mouth daily, Disp: 90 tablet, Rfl: 3  •  latanoprost (XALATAN) 0.005 % ophthalmic solution, INSTILL 1 DROP IN BOTH EYES EVERY DAY AT BEDTIME, Disp: , Rfl:   •  mupirocin (BACTROBAN) 2 % ointment, Apply topically 3 (three) times a day Apply pea size amount to each nostril 2x/day for 5 days prior to procedure, Disp: 15 g, Rfl: 0  •  Omega-3 Fatty Acids (FISH OIL PO), Take by mouth, Disp: , Rfl:   •  rosuvastatin (CRESTOR) 20 MG tablet, Take 1 tablet (20 mg total) by mouth daily, Disp: 90 tablet, Rfl: 3  •  triamcinolone (KENALOG) 0.1 % ointment, Apply topically 2 (two) times a day To the legs., Disp: 30 g, Rfl: 1  •  Turmeric (QC TUMERIC COMPLEX PO), Take by mouth, Disp: , Rfl:   •  clotrimazole-betamethasone (LOTRISONE) 1-0.05 % cream, Apply topically 2 (two) times a day for 10 days, Disp: 45 g, Rfl: 0  •  Emollient (COLLAGEN EX), Apply topically (Patient not taking: Reported on 12/31/2024), Disp: , Rfl:   •  enoxaparin (LOVENOX) 40 mg/0.4 mL, Inject 0.4 mL (40 mg total)  "under the skin daily for 28 days Start injections after surgery (Patient not taking: Reported on 12/31/2024), Disp: 11.2 mL, Rfl: 0  •  ferrous sulfate 324 (65 Fe) mg, Take 1 tablet (324 mg total) by mouth 2 (two) times a day before meals Start 30 days prior to surgery (Patient not taking: Reported on 12/31/2024), Disp: 60 tablet, Rfl: 0  •  methotrexate 2.5 MG tablet, Take 6 tablets (15 mg total) by mouth once a week, Disp: 72 tablet, Rfl: 1  •  olmesartan (BENICAR) 40 mg tablet, Take 1 tablet (40 mg total) by mouth daily, Disp: 90 tablet, Rfl: 3          Whom besides the patient is providing clinical information about today's encounter?   NO ADDITIONAL HISTORIAN (patient alone provided history)    Physical Exam and Assessment/Plan by Diagnosis:    Chief Complaint: Patient is a 68 y/o female present today for a spot of concern on the back of the right leg. Patient notes this has been going on for about 2 months, and occasionally bleeds. Patient also stated dryness and cracking between toes.     MELANOCYTIC NEVI (\"Moles\")    Physical Exam:  Anatomic Location Affected: Mostly on sun-exposed areas of the trunk and extremities  Morphological Description:  Scattered, 1-4mm round to ovoid, symmetrical-appearing, even bordered, skin colored to dark brown macules/papules, mostly in sun-exposed areas  Pertinent Positives:  Pertinent Negatives:    Additional History of Present Condition:  present on exam    Assessment and Plan:  Based on a thorough discussion of this condition and the management approach to it (including a comprehensive discussion of the known risks, side effects and potential benefits of treatment), the patient (family) agrees to implement the following specific plan:  Provided handout with information regarding the ABCDE's of moles   Recommend routine skin exams every year   Sun avoidance, protective clothing (known as UPF clothing), and the use of at least SPF 30 sunscreens is advised. Sunscreen should be " "reapplied every two hours when outside.     SEBORRHEIC KERATOSIS; NON-INFLAMED    Physical Exam:  Anatomic Location Affected:  scattered across trunk, extremities,  face  Morphological Description:  Flat and raised, waxy, smooth to warty textured, yellow to brownish-grey to dark brown to blackish, discrete, \"stuck-on\" appearing papules.  Pertinent Positives:  Pertinent Negatives:    Additional History of Present Condition:  Patient reports new bumps on the skin.  Denies itch, burn, pain, bleeding or ulceration.  Present constantly; nothing seems to make it worse or better.  No prior treatment.      Assessment and Plan:  Based on a thorough discussion of this condition and the management approach to it (including a comprehensive discussion of the known risks, side effects and potential benefits of treatment), the patient (family) agrees to implement the following specific plan:  Reassured benign      ANGIOMA (\"CHERRY ANGIOMA\")    Physical Exam:  Anatomic Location: scattered across sun exposed areas of the trunk and extremities   Morphologic Description: Firm red to reddish-blue discrete papules  Pertinent Positives:  Pertinent Negatives:    Additional History of Present Condition:  Present on exam.     Assessment and Plan:  Reassured benign    IRRITANT CONTACT DERMATITIS     Physical Exam:  Anatomic Location Affected:  lower right leg  Morphological Description:  scaly erythematous patches   Severity: mild  Pertinent Positives:  Pertinent Negatives:    Additional History of Present Condition:  present on exam     Assessment and Plan:  Based on a thorough discussion of this condition and the management approach to it (including a comprehensive discussion of the known risks, side effects and potential benefits of treatment), the patient (family) agrees to implement the following specific plan:  Triamcinolone 0.1% ointment twice daily     Assessment and Plan:  Irritant contact dermatitis is an inflammatory reaction in " "the skin resulting from exposure to a substance that can cause an eruption in most people who come into contact with it. Irritants remove oils and moisture from the skin allowing chemicals to penetrate more deeply, causing more damage. No previous exposure is necessary, so it may occur from a single application of toxic substances or repeated application of mildly irritating substances (e.g., soaps, detergents). In irritant dermatitis, the inherent toxicity of the substance is the most important factor. Those who have atopic dermatitis are at increased risk due to impaired barrier function of the skin.     Some characteristics of irritant contact dermatitis include:  Confined to site of contact with irritant and dependent on substance   Erythema, chapped skin, dryness and fissuring after repeated exposures  Mild to extreme pruritus  Pain when erosions and fissures are present   Severe cases can present with edema, exudate, and tenderness  Potent irritants can produce painful blisters (bullae) within hours after exposure  In babies, can see dribble rash around the mouth due to alkaline saliva     TINEA PEDIS (\"ATHLETE'S FOOT\")    Physical Exam:  Anatomic Location Affected:  bilateral toe web spaces  Morphological Description:  erythema and scaling   Pertinent Positives:  Pertinent Negatives:    Additional History of Present Condition:  present on exam     Assessment and Plan:  Based on a thorough discussion of this condition and the management approach to it (including a comprehensive discussion of the known risks, side effects and potential benefits of treatment), the patient (family) agrees to implement the following specific plan:  Ciclopirox cream 0.77%    Tinea Pedis  Tinea pedis is a fungal infection of the foot and is in fact the most common fungal infection. Tinea pedis is caused by dermatophyte fungi with the three most common being Trichophyton (T.) rubrum, T. interdigitale and Epidermophyton " "floccosum.    Tinea pedis most commonly involves the interdigital spaces, known as \"athlete's foot.\" Other typical sites include the toenails, groin, and palms of the hands.  There are four major manifestations of tinea pedis including chronic hyperkeratotic, chronic intertriginous, acute ulcerative and vesicobullous. Signs and symptoms include:   Itchiness, redness, and scaling between the toes  Scales covering the soles and sides of the feet  Blisters over the inner aspect of the feet    It is particularly common in hot, tropical, and urban environments where sweating in the feet facilitate fungal growth. Risk factors for development include:  Occlusive footwear  Excessive swearing  Diabetes or other underlying immunosuppression   Poor peripheral circulation     The diagnosis of tinea pedis can usually be made via good history and physical exam due to its characteristic clinical features. Diagnosis can be confirmed by examining skin scrapings under the microscope. Cultures are occasionally done but may not be necessary if fungi are seen under microscopy.     Other diagnoses to consider if patients do not respond to therapy include psoriasis, contact dermatitis, and eczema.    Tinea pedis can be treated with topical antifungal drugs applied to affected areas on a repeated basis (usually 2 twice a day) for 2 to 4 weeks. Common topical medications include topical ketoconazole, allylamines, butenafine, ciclopirox, and tolnaftate.  In cases that do not respond to topical therapy, oral antifungal agents may be used which include terbinafine, itraconazole, fluconazole and griseofulvin. These oral agents are also used to treat tinea capitis (fungal infection of the scalp) and onychomycosis (fungal infection of the nails).  Those with pre-existing liver problems are usually screened for liver function prior to starting oral terbinafine.     Tinea pedis can be prevented by making sure feet are clean and dry with protective " footwear worn in communal facilities. Other recommendations are:  Using drying foot powders when wearing occlusive shoes   Thoroughly dry shoes and boots prior to wearing them   Making sure to clean contaminated bathroom floors with bleach   Treatment of family members and other close contacts     Scribe Attestation    I,:  Heather Lynn am acting as a scribe while in the presence of the attending physician.:       I,:  Edson Garcia, DO personally performed the services described in this documentation    as scribed in my presence.:

## 2024-12-31 NOTE — PATIENT INSTRUCTIONS
"IRRITANT CONTACT DERMATITIS   Assessment and Plan:  Based on a thorough discussion of this condition and the management approach to it (including a comprehensive discussion of the known risks, side effects and potential benefits of treatment), the patient (family) agrees to implement the following specific plan:  Triamcinolone 0.1% ointment twice daily     Assessment and Plan:  Irritant contact dermatitis is an inflammatory reaction in the skin resulting from exposure to a substance that can cause an eruption in most people who come into contact with it. Irritants remove oils and moisture from the skin allowing chemicals to penetrate more deeply, causing more damage. No previous exposure is necessary, so it may occur from a single application of toxic substances or repeated application of mildly irritating substances (e.g., soaps, detergents). In irritant dermatitis, the inherent toxicity of the substance is the most important factor. Those who have atopic dermatitis are at increased risk due to impaired barrier function of the skin.     Some characteristics of irritant contact dermatitis include:  Confined to site of contact with irritant and dependent on substance   Erythema, chapped skin, dryness and fissuring after repeated exposures  Mild to extreme pruritus  Pain when erosions and fissures are present   Severe cases can present with edema, exudate, and tenderness  Potent irritants can produce painful blisters (bullae) within hours after exposure  In babies, can see dribble rash around the mouth due to alkaline saliva  TINEA PEDIS (\"ATHLETE'S FOOT\")  Assessment and Plan:  Based on a thorough discussion of this condition and the management approach to it (including a comprehensive discussion of the known risks, side effects and potential benefits of treatment), the patient (family) agrees to implement the following specific plan:  Ciclopirox cream 0.77%    Tinea Pedis  Tinea pedis is a fungal infection of the " "foot and is in fact the most common fungal infection. Tinea pedis is caused by dermatophyte fungi with the three most common being Trichophyton (T.) rubrum, T. interdigitale and Epidermophyton floccosum.    Tinea pedis most commonly involves the interdigital spaces, known as \"athlete's foot.\" Other typical sites include the toenails, groin, and palms of the hands.  There are four major manifestations of tinea pedis including chronic hyperkeratotic, chronic intertriginous, acute ulcerative and vesicobullous. Signs and symptoms include:   Itchiness, redness, and scaling between the toes  Scales covering the soles and sides of the feet  Blisters over the inner aspect of the feet    It is particularly common in hot, tropical, and urban environments where sweating in the feet facilitate fungal growth. Risk factors for development include:  Occlusive footwear  Excessive swearing  Diabetes or other underlying immunosuppression   Poor peripheral circulation     The diagnosis of tinea pedis can usually be made via good history and physical exam due to its characteristic clinical features. Diagnosis can be confirmed by examining skin scrapings under the microscope. Cultures are occasionally done but may not be necessary if fungi are seen under microscopy.     Other diagnoses to consider if patients do not respond to therapy include psoriasis, contact dermatitis, and eczema.    Tinea pedis can be treated with topical antifungal drugs applied to affected areas on a repeated basis (usually 2 twice a day) for 2 to 4 weeks. Common topical medications include topical ketoconazole, allylamines, butenafine, ciclopirox, and tolnaftate.  In cases that do not respond to topical therapy, oral antifungal agents may be used which include terbinafine, itraconazole, fluconazole and griseofulvin. These oral agents are also used to treat tinea capitis (fungal infection of the scalp) and onychomycosis (fungal infection of the nails).  Those " with pre-existing liver problems are usually screened for liver function prior to starting oral terbinafine.     Tinea pedis can be prevented by making sure feet are clean and dry with protective footwear worn in communal facilities. Other recommendations are:  Using drying foot powders when wearing occlusive shoes   Thoroughly dry shoes and boots prior to wearing them   Making sure to clean contaminated bathroom floors with bleach   Treatment of family members and other close contacts

## 2025-01-06 ENCOUNTER — TELEPHONE (OUTPATIENT)
Dept: OBGYN CLINIC | Facility: HOSPITAL | Age: 70
End: 2025-01-06

## 2025-01-06 NOTE — TELEPHONE ENCOUNTER
Preoperative Elective Admission Assessment    EKG/LAB/MRSA SWAB/CXR date: Going 1/18    Living Situation:    Who does pt live with: spouse  What kind of home: multi-level  How do they enter the home: garage  How many levels in home: 2   # of steps to enter home: 2  # of steps to second floor: 12  Are there handrails: Yes  Are there landings: No  Sleeping arrangement: first/entry floor  Where is Bathroom: entry level  Where is the tub or shower: entry level walk in shower w/ grab bars  Toilet height? Concerns for low toilets high toilet seat  Dogs or ther pets: 1 dog     First Floor Setup:   Is there a bathroom: Yes  Where would pt sleep: recliner     DME: rolling walker. Instructed to bring DOS.   We discussed clearing pathways in the home and making sure there is accessibly to use the walker, for example, removing throw rugs.      Patient's Current Level of Function: Ambulates: Independently and ADLs: Independent    Post-op Caregiver: spouse  Caregiver Name and phone number for Inpatient discharge needs: Yousef  Currently receive any HHC/aides/community supports: No     Post-op Transport: spouse  To/from hospital: spouse  To/from PT 2-3x/week: spouse  Uses community transport now: No     Outpatient Physical Therapy Site:  Site: [facility]  pre and post-op appts scheduled? Yes     Medication Management: self and out of bottle  Preferred Pharmacy for Post-op Medications: [facility]   Blood Management Vitamin Regimen:  Starting 30 days prior to surgery  Post-op anticoagulant: prescribed preoperatively - patient has at home ready for after surgery use only  Has Bactroban for 5 days preop: Yes  Educated on Preoperative Bathing Instructions, and use of Soap for 5 days before surgery.      DC Plan: Pt plans to be discharged home    Barriers to DC identified preoperatively: none identified    BMI: 28.25    Patient Education:  Pt educated on post-op pain, early mobilization (POD0), LOS goals, OP PT goals, and preoperative  bathing. Patient educated that our goal is to appropriately discharge patient based off their post-op function while striving to maintain maximal independence. The goal is to discharge patient to home and for them to attend outpatient physical therapy.    Assigned to care team? Yes

## 2025-01-17 ENCOUNTER — APPOINTMENT (OUTPATIENT)
Dept: LAB | Facility: HOSPITAL | Age: 70
End: 2025-01-17
Payer: MEDICARE

## 2025-01-17 ENCOUNTER — OFFICE VISIT (OUTPATIENT)
Dept: LAB | Facility: HOSPITAL | Age: 70
End: 2025-01-17
Payer: MEDICARE

## 2025-01-17 DIAGNOSIS — M17.11 PRIMARY OSTEOARTHRITIS OF RIGHT KNEE: ICD-10-CM

## 2025-01-17 DIAGNOSIS — M06.00 SERONEGATIVE RHEUMATOID ARTHRITIS (HCC): ICD-10-CM

## 2025-01-17 DIAGNOSIS — Z79.899 HIGH RISK MEDICATION USE: ICD-10-CM

## 2025-01-17 DIAGNOSIS — Z79.01 ANTICOAGULATION MANAGEMENT ENCOUNTER: ICD-10-CM

## 2025-01-17 DIAGNOSIS — Z01.818 PRE-OP TESTING: ICD-10-CM

## 2025-01-17 DIAGNOSIS — M15.0 PRIMARY OSTEOARTHRITIS INVOLVING MULTIPLE JOINTS: ICD-10-CM

## 2025-01-17 DIAGNOSIS — Z51.81 ANTICOAGULATION MANAGEMENT ENCOUNTER: ICD-10-CM

## 2025-01-17 LAB
ALBUMIN SERPL BCG-MCNC: 4.1 G/DL (ref 3.5–5)
ALP SERPL-CCNC: 64 U/L (ref 34–104)
ALT SERPL W P-5'-P-CCNC: 17 U/L (ref 7–52)
ANION GAP SERPL CALCULATED.3IONS-SCNC: 6 MMOL/L (ref 4–13)
APTT PPP: 26 SECONDS (ref 23–34)
AST SERPL W P-5'-P-CCNC: 19 U/L (ref 13–39)
BASOPHILS # BLD AUTO: 0.05 THOUSANDS/ΜL (ref 0–0.1)
BASOPHILS NFR BLD AUTO: 1 % (ref 0–1)
BILIRUB SERPL-MCNC: 0.57 MG/DL (ref 0.2–1)
BUN SERPL-MCNC: 21 MG/DL (ref 5–25)
CALCIUM SERPL-MCNC: 9.6 MG/DL (ref 8.4–10.2)
CHLORIDE SERPL-SCNC: 106 MMOL/L (ref 96–108)
CO2 SERPL-SCNC: 27 MMOL/L (ref 21–32)
CREAT SERPL-MCNC: 0.74 MG/DL (ref 0.6–1.3)
CRP SERPL QL: 5.4 MG/L
EOSINOPHIL # BLD AUTO: 0.21 THOUSAND/ΜL (ref 0–0.61)
EOSINOPHIL NFR BLD AUTO: 4 % (ref 0–6)
ERYTHROCYTE [DISTWIDTH] IN BLOOD BY AUTOMATED COUNT: 13.7 % (ref 11.6–15.1)
ERYTHROCYTE [SEDIMENTATION RATE] IN BLOOD: 23 MM/HOUR (ref 0–29)
EST. AVERAGE GLUCOSE BLD GHB EST-MCNC: 103 MG/DL
FERRITIN SERPL-MCNC: 75 NG/ML (ref 11–307)
GFR SERPL CREATININE-BSD FRML MDRD: 83 ML/MIN/1.73SQ M
GLUCOSE P FAST SERPL-MCNC: 92 MG/DL (ref 65–99)
HBA1C MFR BLD: 5.2 %
HCT VFR BLD AUTO: 37.5 % (ref 34.8–46.1)
HGB BLD-MCNC: 12.1 G/DL (ref 11.5–15.4)
IMM GRANULOCYTES # BLD AUTO: 0.01 THOUSAND/UL (ref 0–0.2)
IMM GRANULOCYTES NFR BLD AUTO: 0 % (ref 0–2)
INR PPP: 0.93 (ref 0.85–1.19)
IRON SATN MFR SERPL: 34 % (ref 15–50)
IRON SERPL-MCNC: 102 UG/DL (ref 50–212)
LYMPHOCYTES # BLD AUTO: 1.29 THOUSANDS/ΜL (ref 0.6–4.47)
LYMPHOCYTES NFR BLD AUTO: 24 % (ref 14–44)
MCH RBC QN AUTO: 30.2 PG (ref 26.8–34.3)
MCHC RBC AUTO-ENTMCNC: 32.3 G/DL (ref 31.4–37.4)
MCV RBC AUTO: 94 FL (ref 82–98)
MONOCYTES # BLD AUTO: 0.57 THOUSAND/ΜL (ref 0.17–1.22)
MONOCYTES NFR BLD AUTO: 11 % (ref 4–12)
NEUTROPHILS # BLD AUTO: 3.29 THOUSANDS/ΜL (ref 1.85–7.62)
NEUTS SEG NFR BLD AUTO: 60 % (ref 43–75)
NRBC BLD AUTO-RTO: 0 /100 WBCS
PLATELET # BLD AUTO: 314 THOUSANDS/UL (ref 149–390)
PMV BLD AUTO: 10.6 FL (ref 8.9–12.7)
POTASSIUM SERPL-SCNC: 4.4 MMOL/L (ref 3.5–5.3)
PROT SERPL-MCNC: 7.3 G/DL (ref 6.4–8.4)
PROTHROMBIN TIME: 13.1 SECONDS (ref 12.3–15)
QRS AXIS: -28 DEGREES
QRS AXIS: -71 DEGREES
QRSD INTERVAL: 180 MS
QRSD INTERVAL: 218 MS
QT INTERVAL: 498 MS
QT INTERVAL: 540 MS
QTC INTERVAL: 498 MS
QTC INTERVAL: 535 MS
RBC # BLD AUTO: 4.01 MILLION/UL (ref 3.81–5.12)
RETICS # AUTO: ABNORMAL 10*3/UL (ref 14097–95744)
RETICS # CALC: 1.95 % (ref 0.37–1.87)
SODIUM SERPL-SCNC: 139 MMOL/L (ref 135–147)
T WAVE AXIS: -65 DEGREES
T WAVE AXIS: -77 DEGREES
TIBC SERPL-MCNC: 303.8 UG/DL (ref 250–450)
TRANSFERRIN SERPL-MCNC: 217 MG/DL (ref 203–362)
UIBC SERPL-MCNC: 202 UG/DL (ref 155–355)
VENTRICULAR RATE: 59 BPM
VENTRICULAR RATE: 60 BPM
WBC # BLD AUTO: 5.42 THOUSAND/UL (ref 4.31–10.16)

## 2025-01-17 PROCEDURE — 83540 ASSAY OF IRON: CPT

## 2025-01-17 PROCEDURE — 83036 HEMOGLOBIN GLYCOSYLATED A1C: CPT

## 2025-01-17 PROCEDURE — 85652 RBC SED RATE AUTOMATED: CPT

## 2025-01-17 PROCEDURE — 85730 THROMBOPLASTIN TIME PARTIAL: CPT

## 2025-01-17 PROCEDURE — 85025 COMPLETE CBC W/AUTO DIFF WBC: CPT

## 2025-01-17 PROCEDURE — 83550 IRON BINDING TEST: CPT

## 2025-01-17 PROCEDURE — 85610 PROTHROMBIN TIME: CPT

## 2025-01-17 PROCEDURE — 86140 C-REACTIVE PROTEIN: CPT

## 2025-01-17 PROCEDURE — 85045 AUTOMATED RETICULOCYTE COUNT: CPT

## 2025-01-17 PROCEDURE — 36415 COLL VENOUS BLD VENIPUNCTURE: CPT

## 2025-01-17 PROCEDURE — 87081 CULTURE SCREEN ONLY: CPT

## 2025-01-17 PROCEDURE — 93005 ELECTROCARDIOGRAM TRACING: CPT

## 2025-01-17 PROCEDURE — 80053 COMPREHEN METABOLIC PANEL: CPT

## 2025-01-17 PROCEDURE — 82728 ASSAY OF FERRITIN: CPT

## 2025-01-18 LAB — MRSA NOSE QL CULT: NORMAL

## 2025-01-20 PROBLEM — D35.1 PARATHYROID ADENOMA: Status: ACTIVE | Noted: 2024-01-17

## 2025-01-20 RX ORDER — MULTIVITAMIN
1 CAPSULE ORAL DAILY
COMMUNITY

## 2025-01-20 NOTE — PRE-PROCEDURE INSTRUCTIONS
Pre-Surgery Instructions:   Medication Instructions    ascorbic acid (VITAMIN C) 500 MG tablet Hold day of surgery.    azelastine (ASTELIN) 0.1 % nasal spray Uses PRN- OK to take day of surgery    B Complex Vitamins (B COMPLEX 1 PO) Hold day of surgery.    cholecalciferol (VITAMIN D3) 1,000 units tablet Hold day of surgery.    ciclopirox (LOPROX) 0.77 % cream Hold day of surgery.    cyanocobalamin (VITAMIN B-12) 100 MCG tablet Hold day of surgery.    ferrous sulfate 324 (65 Fe) mg Hold day of surgery.    fluticasone (FLONASE) 50 mcg/act nasal spray Uses PRN- OK to take day of surgery    folic acid (FOLVITE) 1 mg tablet Hold day of surgery.    gabapentin (NEURONTIN) 600 MG tablet Take night before surgery    hydroCHLOROthiazide 25 mg tablet Hold day of surgery.    latanoprost (XALATAN) 0.005 % ophthalmic solution Take night before surgery    Magnesium 250 MG CAPS Stop taking 7 days prior to surgery.    methotrexate 2.5 MG tablet Instructions provided by MD    Multiple Vitamin (multivitamin) capsule Hold day of surgery.    olmesartan (BENICAR) 40 mg tablet Take night before surgery    Omega-3 Fatty Acids (FISH OIL PO) Stop taking 7 days prior to surgery.    triamcinolone (KENALOG) 0.1 % ointment Hold day of surgery.    Turmeric (QC TUMERIC COMPLEX PO) Stop taking 7 days prior to surgery.    Medication instructions for day surgery reviewed. Please use only a sip of water to take your instructed medications. Avoid all over the counter vitamins, supplements and NSAIDS for one week prior to surgery per anesthesia guidelines. Tylenol is ok to take as needed.     You will receive a call one business day prior to surgery with an arrival time and hospital directions. If your surgery is scheduled on a Monday, the hospital will be calling you on the Friday prior to your surgery. If you have not heard from anyone by 8pm, please call the hospital supervisor through the hospital  at [phone]. ([place] [phone] or  [place] [phone]).    Do not eat or drink anything after midnight the night before your surgery, including candy, mints, lifesavers, or chewing gum. Do not drink alcohol 24hrs before your surgery. Try not to smoke at least 24hrs before your surgery.       Follow the pre surgery showering instructions as listed in the “My Surgical Experience Booklet” or otherwise provided by your surgeon's office. Do not use a blade to shave the surgical area 1 week before surgery. It is okay to use a clean electric clippers up to 24 hours before surgery. Do not apply any lotions, creams, including makeup, cologne, deodorant, or perfumes after showering on the day of your surgery. Do not use dry shampoo, hair spray, hair gel, or any type of hair products.     No contact lenses, eye make-up, or artificial eyelashes. Remove nail polish, including gel polish, and any artificial, gel, or acrylic nails if possible. Remove all jewelry including rings and body piercing jewelry.     Wear causal clothing that is easy to take on and off. Consider your type of surgery.    Keep any valuables, jewelry, piercings at home. Please bring any specially ordered equipment (sling, braces) if indicated.    Arrange for a responsible person to drive you to and from the hospital on the day of your surgery. Please confirm the visitor policy for the day of your procedure when you receive your phone call with an arrival time.     Call the surgeon's office with any new illnesses, exposures, or additional questions prior to surgery.    Please reference your “My Surgical Experience Booklet” for additional information to prepare for your upcoming surgery.    Pt verbalized understanding of shower, med, mupirocin instructions.

## 2025-01-21 ENCOUNTER — CONSULT (OUTPATIENT)
Dept: FAMILY MEDICINE CLINIC | Facility: CLINIC | Age: 70
End: 2025-01-21
Payer: MEDICARE

## 2025-01-21 VITALS
SYSTOLIC BLOOD PRESSURE: 135 MMHG | OXYGEN SATURATION: 96 % | HEIGHT: 66 IN | WEIGHT: 179 LBS | BODY MASS INDEX: 28.77 KG/M2 | DIASTOLIC BLOOD PRESSURE: 80 MMHG | HEART RATE: 73 BPM | TEMPERATURE: 98.4 F

## 2025-01-21 DIAGNOSIS — Z01.818 PREOP EXAM FOR INTERNAL MEDICINE: ICD-10-CM

## 2025-01-21 DIAGNOSIS — I10 ESSENTIAL HYPERTENSION: ICD-10-CM

## 2025-01-21 DIAGNOSIS — M06.09 RHEUMATOID ARTHRITIS OF MULTIPLE SITES WITH NEGATIVE RHEUMATOID FACTOR (HCC): ICD-10-CM

## 2025-01-21 DIAGNOSIS — M85.89 OSTEOPENIA OF MULTIPLE SITES: ICD-10-CM

## 2025-01-21 DIAGNOSIS — G60.9 IDIOPATHIC PERIPHERAL NEUROPATHY: ICD-10-CM

## 2025-01-21 DIAGNOSIS — M17.11 PRIMARY OSTEOARTHRITIS OF RIGHT KNEE: Primary | ICD-10-CM

## 2025-01-21 PROBLEM — B35.3 TINEA PEDIS OF BOTH FEET: Status: RESOLVED | Noted: 2024-10-17 | Resolved: 2025-01-21

## 2025-01-21 PROBLEM — E27.1 PRIMARY ADRENOCORTICAL INSUFFICIENCY (HCC): Status: ACTIVE | Noted: 2025-01-21

## 2025-01-21 PROBLEM — M79.641 PAIN IN BOTH HANDS: Status: RESOLVED | Noted: 2023-07-06 | Resolved: 2025-01-21

## 2025-01-21 PROBLEM — G89.29 CHRONIC PAIN OF BOTH SHOULDERS: Status: RESOLVED | Noted: 2021-05-06 | Resolved: 2025-01-21

## 2025-01-21 PROBLEM — M19.90 ARTHRITIS: Status: RESOLVED | Noted: 2021-05-06 | Resolved: 2025-01-21

## 2025-01-21 PROBLEM — R49.0 HOARSENESS, CHRONIC: Status: RESOLVED | Noted: 2024-10-17 | Resolved: 2025-01-21

## 2025-01-21 PROBLEM — M25.562 CHRONIC PAIN OF BOTH KNEES: Status: RESOLVED | Noted: 2024-10-17 | Resolved: 2025-01-21

## 2025-01-21 PROBLEM — M79.642 PAIN IN BOTH HANDS: Status: RESOLVED | Noted: 2023-07-06 | Resolved: 2025-01-21

## 2025-01-21 PROBLEM — G89.29 CHRONIC PAIN OF BOTH KNEES: Status: RESOLVED | Noted: 2024-10-17 | Resolved: 2025-01-21

## 2025-01-21 PROBLEM — M25.561 CHRONIC PAIN OF BOTH KNEES: Status: RESOLVED | Noted: 2024-10-17 | Resolved: 2025-01-21

## 2025-01-21 PROBLEM — E66.3 OVERWEIGHT: Status: RESOLVED | Noted: 2020-01-13 | Resolved: 2025-01-21

## 2025-01-21 PROBLEM — E27.1 PRIMARY ADRENOCORTICAL INSUFFICIENCY (HCC): Status: RESOLVED | Noted: 2025-01-21 | Resolved: 2025-01-21

## 2025-01-21 PROBLEM — M79.642 BILATERAL HAND PAIN: Status: RESOLVED | Noted: 2021-05-06 | Resolved: 2025-01-21

## 2025-01-21 PROBLEM — M25.512 CHRONIC PAIN OF BOTH SHOULDERS: Status: RESOLVED | Noted: 2021-05-06 | Resolved: 2025-01-21

## 2025-01-21 PROBLEM — M25.511 CHRONIC PAIN OF BOTH SHOULDERS: Status: RESOLVED | Noted: 2021-05-06 | Resolved: 2025-01-21

## 2025-01-21 PROBLEM — M79.641 BILATERAL HAND PAIN: Status: RESOLVED | Noted: 2021-05-06 | Resolved: 2025-01-21

## 2025-01-21 PROBLEM — E21.3 HYPERPARATHYROIDISM (HCC): Status: RESOLVED | Noted: 2021-04-20 | Resolved: 2025-01-21

## 2025-01-21 PROBLEM — T30.0 BURN: Status: RESOLVED | Noted: 2024-10-17 | Resolved: 2025-01-21

## 2025-01-21 PROCEDURE — 93000 ELECTROCARDIOGRAM COMPLETE: CPT | Performed by: NURSE PRACTITIONER

## 2025-01-21 PROCEDURE — 99214 OFFICE O/P EST MOD 30 MIN: CPT | Performed by: NURSE PRACTITIONER

## 2025-01-21 RX ORDER — GABAPENTIN 600 MG/1
600 TABLET ORAL
Start: 2025-01-21

## 2025-01-21 NOTE — ASSESSMENT & PLAN NOTE
Orders:  •  DXA bone density spine hip and pelvis; Future  due for her dexa scan ordered last check was 2022 showing osteopenia  Alar Island Pedicle Flap Text: The defect edges were debeveled with a #15 scalpel blade. Given the location of the defect, shape of the defect and the proximity to the alar rim an island pedicle advancement flap was deemed most appropriate. Using a sterile surgical marker, an appropriate advancement flap was drawn incorporating the defect, outlining the appropriate donor tissue and placing the expected incisions within the nasal ala running parallel to the alar rim. The area thus outlined was incised with a #15 scalpel blade. The skin margins were undermined minimally to an appropriate distance in all directions around the primary defect and laterally outward around the island pedicle utilizing iris scissors.  There was minimal undermining beneath the pedicle flap. Following this, the designed flap was carried over into the primary defect and sutured into place.

## 2025-01-21 NOTE — PROGRESS NOTES
Rheumatology Follow-up Visit  Name: Shiva Shabazz      : 1955      MRN: [id number]  Encounter Provider: Sherley Britton MD  Encounter Date: 2025   Encounter department: [facility] [address]  :  Assessment & Plan  Seronegative rheumatoid arthritis (HCC)  69-year-old female who presents for follow-up of seronegative rheumatoid arthritis/PMR.  Her inflammatory symptoms remain well-controlled on methotrexate 15 mg weekly and daily folic acid.  Recent lab work monitoring is up-to-date.  She will be getting a right total knee replacement for known osteoarthritis.  Discussed obtaining baseline x-rays of the C-spine to evaluate for cervical instability prior to surgery.  Discussed that according to the  ACR/AAHKS guidelines, methotrexate is safe to continue though surgery without stopping as long as there is no concern for infection.  Patient will follow-up in 6 months.    ADDENDUM: Reviewed the cervical spine x-rays.  No concern for subluxation.  Patient is cleared from a rheumatology perspective to proceed with surgery.  Orders:    XR spine cervical complete 4 or 5 vw non injury; Future    methotrexate 2.5 MG tablet; Take 6 tablets (15 mg total) by mouth once a week    CBC and differential; Standing    Comprehensive metabolic panel; Standing    High risk medication use    Orders:    methotrexate 2.5 MG tablet; Take 6 tablets (15 mg total) by mouth once a week    Primary osteoarthritis of right knee    Orders:    folic acid (FOLVITE) 1 mg tablet; Take 1 tablet (1 mg total) by mouth daily Start 30 days prior to surgery    Long-term use of immunosuppressant medication             History of Present Illness   HPI  Shiva Shabazz is a 69 y.o. female who presents for follow-up.    Interval History:  Patient was seen by orthopedics for chronic right knee pain.  Patient diagnosed with osteoarthritis and recommended for total knee replacement.    Patient reports overall symptoms are  "improved on methotrexate 15 mg weekly.  She has no difficulty tolerating the medication.  Takes her daily folic acid.    Permanent History:  Patient was diagnosed with seronegative rheumatoid arthritis by outside provider.  Established with [facility] in November 2021.  Workup showed negative RF, CCP and x-rays of the hands and shoulders were most consistent with osteoarthritis with right calcific tendinitis.  Ultrasound of the hands showed synovial proliferation at the MCPs without synovitis or erosions.  There was a concern for possible PMR given distribution of symptoms and elevated inflammatory markers.  Ultimately diagnosed with seronegative rheumatoid arthritis and tried on hydroxychloroquine and sulfasalazine without improvement.  Then started on methotrexate in 2023 with improvement in symptoms.    Review of Systems  Complete ROS conducted as per HPI. In addition, denies:  Fever  Photosensitive rash  Sicca symptoms  Recurrent oral ulcers  Muscle weakness  Uveitis  Dactylitis  Chest pain  SOB  Pleurisy  Gross hematuria  Foamy urine  Raynaud's  Joint issues other than noted above    Objective   /82 (BP Location: Right arm, Patient Position: Sitting, Cuff Size: Adult)   Pulse 74   Temp (!) 97.4 °F (36.3 °C) (Tympanic)   Ht 5' 6\" (1.676 m)   Wt 78.9 kg (174 lb)   LMP  (LMP Unknown)   SpO2 96%   BMI 28.08 kg/m²      Physical Exam  Physical Exam  Constitutional: well appearing, no acute distress  HEENT: normocephalic, sclera clear, no visible oral or nasal ulcers  Neck: supple, no palpable cervical adenopathy  CV: regular rate and rhythm, no murmur  Pulm: normal respiratory effort, lungs clear to auscultation b/l  Skin: no rashes, no skin thickening  Extremities: warm and well perfused, edema left lower extremity, evidence of prior skin grafting and surgeries of the lower extremities  MSK: Chronic OA changes bilateral hands, no synovitis or effusions    Labs and Imaging  I have personally reviewed " pertinent labs and imaging.
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
## 2025-01-21 NOTE — ASSESSMENT & PLAN NOTE
Orders:  •  gabapentin (NEURONTIN) 600 MG tablet; Take 1 tablet (600 mg total) by mouth daily at bedtime  Taking the gabapentin at night 600 mg and is helpful

## 2025-01-21 NOTE — ASSESSMENT & PLAN NOTE
Orders:  •  Ambulatory Referral to [facility]  Patient is cleared for her upcoming right knee replacement surgery scheduled for 2/12 with Dr. Murdock

## 2025-01-26 ENCOUNTER — ANESTHESIA EVENT (OUTPATIENT)
Age: 70
End: 2025-01-26

## 2025-01-26 ENCOUNTER — ANESTHESIA (OUTPATIENT)
Age: 70
End: 2025-01-26

## 2025-01-27 ENCOUNTER — EVALUATION (OUTPATIENT)
Dept: PHYSICAL THERAPY | Facility: CLINIC | Age: 70
End: 2025-01-27
Payer: MEDICARE

## 2025-01-27 DIAGNOSIS — M17.11 PRIMARY OSTEOARTHRITIS OF RIGHT KNEE: ICD-10-CM

## 2025-01-27 PROCEDURE — 97161 PT EVAL LOW COMPLEX 20 MIN: CPT | Performed by: PHYSICAL THERAPIST

## 2025-01-27 PROCEDURE — 97110 THERAPEUTIC EXERCISES: CPT | Performed by: PHYSICAL THERAPIST
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
## 2025-01-28 ENCOUNTER — OFFICE VISIT (OUTPATIENT)
Age: 70
End: 2025-01-28
Payer: MEDICARE

## 2025-01-28 VITALS
HEART RATE: 74 BPM | TEMPERATURE: 97.4 F | SYSTOLIC BLOOD PRESSURE: 136 MMHG | HEIGHT: 66 IN | BODY MASS INDEX: 27.97 KG/M2 | OXYGEN SATURATION: 96 % | DIASTOLIC BLOOD PRESSURE: 82 MMHG | WEIGHT: 174 LBS

## 2025-01-28 DIAGNOSIS — Z79.899 HIGH RISK MEDICATION USE: ICD-10-CM

## 2025-01-28 DIAGNOSIS — Z79.60 LONG-TERM USE OF IMMUNOSUPPRESSANT MEDICATION: ICD-10-CM

## 2025-01-28 DIAGNOSIS — M06.00 SERONEGATIVE RHEUMATOID ARTHRITIS (HCC): Primary | ICD-10-CM

## 2025-01-28 DIAGNOSIS — M17.11 PRIMARY OSTEOARTHRITIS OF RIGHT KNEE: ICD-10-CM

## 2025-01-28 DIAGNOSIS — I10 ESSENTIAL HYPERTENSION: ICD-10-CM

## 2025-01-28 PROCEDURE — 99214 OFFICE O/P EST MOD 30 MIN: CPT | Performed by: STUDENT IN AN ORGANIZED HEALTH CARE EDUCATION/TRAINING PROGRAM

## 2025-01-28 PROCEDURE — G2211 COMPLEX E/M VISIT ADD ON: HCPCS | Performed by: STUDENT IN AN ORGANIZED HEALTH CARE EDUCATION/TRAINING PROGRAM

## 2025-01-28 RX ORDER — METHOTREXATE 2.5 MG/1
15 TABLET ORAL WEEKLY
Qty: 72 TABLET | Refills: 3 | Status: SHIPPED | OUTPATIENT
Start: 2025-01-28 | End: 2025-04-28

## 2025-01-28 RX ORDER — FOLIC ACID 1 MG/1
1 TABLET ORAL DAILY
Qty: 90 TABLET | Refills: 3 | Status: SHIPPED | OUTPATIENT
Start: 2025-01-28

## 2025-01-28 NOTE — ASSESSMENT & PLAN NOTE
Orders:    folic acid (FOLVITE) 1 mg tablet; Take 1 tablet (1 mg total) by mouth daily Start 30 days prior to surgery

## 2025-01-29 ENCOUNTER — TELEPHONE (OUTPATIENT)
Age: 70
End: 2025-01-29

## 2025-01-29 RX ORDER — HYDROCHLOROTHIAZIDE 25 MG/1
25 TABLET ORAL DAILY
Qty: 90 TABLET | Refills: 1 | Status: SHIPPED | OUTPATIENT
Start: 2025-01-29

## 2025-01-29 RX ORDER — OLMESARTAN MEDOXOMIL 40 MG/1
40 TABLET ORAL DAILY
Qty: 90 TABLET | Refills: 1 | Status: SHIPPED | OUTPATIENT
Start: 2025-01-29

## 2025-01-29 NOTE — TELEPHONE ENCOUNTER
Shelbi from [facility] Is calling to ask if the patient has been cleared for surgery on 2/10. Judging by the office visit notes from yesterday, they were not able to determine that. They can be reached at [phone].    Please advise.

## 2025-01-30 ENCOUNTER — HOSPITAL ENCOUNTER (OUTPATIENT)
Dept: RADIOLOGY | Facility: HOSPITAL | Age: 70
End: 2025-01-30
Payer: MEDICARE

## 2025-01-30 DIAGNOSIS — M06.00 SERONEGATIVE RHEUMATOID ARTHRITIS (HCC): ICD-10-CM

## 2025-01-30 PROCEDURE — 72050 X-RAY EXAM NECK SPINE 4/5VWS: CPT

## 2025-01-31 NOTE — TELEPHONE ENCOUNTER
Can you please let patient know that I reviewed the results of her x-ray.  She has some mild arthritis in the neck, but no major concerning findings.  I am providing clearance for her to proceed with orthopedic surgery.    Per the previous message, can you please also notify Shelbi from [facility] at the number provided to let her know that rheumatology has cleared the patient for surgery.    Thank you!

## 2025-01-31 NOTE — TELEPHONE ENCOUNTER
Spoke with pt and informed or provider message. Also spoke with Shelbi and informed Dr. Britton has cleared pt for surgery. Shelbi asked if Dr. Britton could either add a letter to the chart or addend the recent OV note to reflect this.

## 2025-02-07 RX ORDER — VANCOMYCIN HYDROCHLORIDE 1 G/200ML
1000 INJECTION, SOLUTION INTRAVENOUS EVERY 12 HOURS
Status: CANCELLED | OUTPATIENT
Start: 2025-02-10 | End: 2025-02-11

## 2025-02-07 RX ORDER — ENOXAPARIN SODIUM 100 MG/ML
40 INJECTION SUBCUTANEOUS DAILY
Status: CANCELLED | OUTPATIENT
Start: 2025-02-10

## 2025-02-07 NOTE — DISCHARGE INSTR - AVS FIRST PAGE
Discharge Instructions: Knee replacement with Dr. Murdock    Weight Bearing Status:                                           Weight Bearing as tolerated to the right lower extremity with assistive devices.     Pain Management/Medications  You may resume your usual medications.  You may stop pre-operative vitamins (Folic acid, Ferrous sulfate, and Vitamin C).   Please take the following medications:  Anti-coagulation (blood clot prevention) - Complete Lovenox injections for 28 days.   Pain medication:  Oxycodone 5 m tablet every 6 hours as needed for severe pain  Tizanidine (Muscle relaxer) 2 m tablet up to 3 times a day as needed for mild pain and muscle discomfort  Tylenol 1000 mg: up to three times daily as needed for mild to moderate pain. Do not exceed 3000mg daily   Continue applying ice to your knee on and off for about 20 minutes as needed.  Continue to elevate your operative leg, with ankle above the level of your heart when possible.    If you have questions or pain concerns, please contact the office.   If you need refills of your medications prior to your next office visit, please contact the office.     Showering/Dressing Instructions:   Do not shower until first follow up appointment.  Keep surgical dressing clean and dry until follow up appointment.  You may adjust the ACE bandage as it slides down   No baths, swimming, or submerging your leg until cleared to do so.      Driving Instructions:  No driving until cleared by Orthopaedic Surgery.    PT/OT:  Continue PT/OT on outpatient basis as directed    Follow up instructions:   Follow up as scheduled on 2025 in [place]  If you need to change or cancel your appointment for any reason, please call the clinic at [phone]    Please contact the office if you experience any of the following:  Excessive bleeding (bleeding through your dressing)  Fever greater than 101 degrees F after 48 hours (low grade fevers the day or two after surgery  are normal)  Persistent nausea or vomiting  Decreased sensation or discoloration of the operative limb  Pain or swelling that is getting worse and not better with medication    Miscellaneous:  Advise against any dental cleanings or procedures for 3 months after surgery.   - If there is a dental emergency, please contact the office for further instructions.

## 2025-02-08 ENCOUNTER — ANESTHESIA EVENT (OUTPATIENT)
Age: 70
End: 2025-02-08
Payer: MEDICARE

## 2025-02-10 ENCOUNTER — ANESTHESIA (OUTPATIENT)
Age: 70
End: 2025-02-10
Payer: MEDICARE

## 2025-02-10 ENCOUNTER — HOSPITAL ENCOUNTER (OUTPATIENT)
Age: 70
Setting detail: OUTPATIENT SURGERY
Discharge: HOME | End: 2025-02-11
Attending: ORTHOPAEDIC SURGERY | Admitting: ORTHOPAEDIC SURGERY
Payer: MEDICARE

## 2025-02-10 DIAGNOSIS — M17.11 PRIMARY OSTEOARTHRITIS OF RIGHT KNEE: Primary | ICD-10-CM

## 2025-02-10 PROCEDURE — C1776 JOINT DEVICE (IMPLANTABLE): HCPCS | Performed by: ORTHOPAEDIC SURGERY

## 2025-02-10 PROCEDURE — 97167 OT EVAL HIGH COMPLEX 60 MIN: CPT

## 2025-02-10 PROCEDURE — 27447 TOTAL KNEE ARTHROPLASTY: CPT

## 2025-02-10 PROCEDURE — 97163 PT EVAL HIGH COMPLEX 45 MIN: CPT | Performed by: PHYSICAL THERAPIST

## 2025-02-10 PROCEDURE — C1713 ANCHOR/SCREW BN/BN,TIS/BN: HCPCS | Performed by: ORTHOPAEDIC SURGERY

## 2025-02-10 PROCEDURE — S2900 ROBOTIC SURGICAL SYSTEM: HCPCS | Performed by: ORTHOPAEDIC SURGERY

## 2025-02-10 PROCEDURE — 99222 1ST HOSP IP/OBS MODERATE 55: CPT | Performed by: INTERNAL MEDICINE

## 2025-02-10 PROCEDURE — 97116 GAIT TRAINING THERAPY: CPT | Performed by: PHYSICAL THERAPIST

## 2025-02-10 PROCEDURE — 97530 THERAPEUTIC ACTIVITIES: CPT | Performed by: PHYSICAL THERAPIST

## 2025-02-10 PROCEDURE — NC001 PR NO CHARGE: Performed by: ORTHOPAEDIC SURGERY

## 2025-02-10 PROCEDURE — 27447 TOTAL KNEE ARTHROPLASTY: CPT | Performed by: ORTHOPAEDIC SURGERY

## 2025-02-10 PROCEDURE — 97535 SELF CARE MNGMENT TRAINING: CPT

## 2025-02-10 DEVICE — ATTUNE KNEE SYSTEM TIBIAL INSERT FIXED BEARING POSTERIOR STABILIZED 5 5MM AOX
Type: IMPLANTABLE DEVICE | Site: KNEE | Status: FUNCTIONAL
Brand: ATTUNE

## 2025-02-10 DEVICE — ATTUNE KNEE SYSTEM TIBIAL BASE FIXED BEARING SIZE 5 CEMENTED
Type: IMPLANTABLE DEVICE | Site: KNEE | Status: FUNCTIONAL
Brand: ATTUNE

## 2025-02-10 DEVICE — ATTUNE KNEE SYSTEM FEMORAL POSTERIOR STABILIZED SIZE 5 RIGHT CEMENTED
Type: IMPLANTABLE DEVICE | Site: KNEE | Status: FUNCTIONAL
Brand: ATTUNE

## 2025-02-10 DEVICE — ATTUNE PATELLA MEDIALIZED DOME 35MM CEMENTED AOX
Type: IMPLANTABLE DEVICE | Site: KNEE | Status: FUNCTIONAL
Brand: ATTUNE

## 2025-02-10 DEVICE — SMARTSET HV HIGH VISCOSITY BONE CEMENT 40G
Type: IMPLANTABLE DEVICE | Site: KNEE | Status: FUNCTIONAL
Brand: SMARTSET

## 2025-02-10 RX ORDER — OXYCODONE HYDROCHLORIDE 5 MG/1
5 TABLET ORAL EVERY 4 HOURS PRN
Status: DISCONTINUED | OUTPATIENT
Start: 2025-02-10 | End: 2025-02-11 | Stop reason: HOSPADM

## 2025-02-10 RX ORDER — ENOXAPARIN SODIUM 100 MG/ML
40 INJECTION SUBCUTANEOUS
Status: DISCONTINUED | OUTPATIENT
Start: 2025-02-10 | End: 2025-02-11 | Stop reason: HOSPADM

## 2025-02-10 RX ORDER — ALBUTEROL SULFATE 0.83 MG/ML
2.5 SOLUTION RESPIRATORY (INHALATION) ONCE AS NEEDED
Status: DISCONTINUED | OUTPATIENT
Start: 2025-02-10 | End: 2025-02-10 | Stop reason: HOSPADM

## 2025-02-10 RX ORDER — ONDANSETRON 2 MG/ML
INJECTION INTRAMUSCULAR; INTRAVENOUS AS NEEDED
Status: DISCONTINUED | OUTPATIENT
Start: 2025-02-10 | End: 2025-02-10

## 2025-02-10 RX ORDER — ATORVASTATIN CALCIUM 40 MG/1
40 TABLET, FILM COATED ORAL
Status: DISCONTINUED | OUTPATIENT
Start: 2025-02-10 | End: 2025-02-11 | Stop reason: HOSPADM

## 2025-02-10 RX ORDER — ACETAMINOPHEN 325 MG/1
975 TABLET ORAL EVERY 6 HOURS PRN
Status: DISCONTINUED | OUTPATIENT
Start: 2025-02-10 | End: 2025-02-11 | Stop reason: HOSPADM

## 2025-02-10 RX ORDER — METHOCARBAMOL 500 MG/1
500 TABLET, FILM COATED ORAL EVERY 6 HOURS SCHEDULED
Status: DISCONTINUED | OUTPATIENT
Start: 2025-02-10 | End: 2025-02-11 | Stop reason: HOSPADM

## 2025-02-10 RX ORDER — PROMETHAZINE HYDROCHLORIDE 25 MG/ML
12.5 INJECTION, SOLUTION INTRAMUSCULAR; INTRAVENOUS ONCE AS NEEDED
Status: DISCONTINUED | OUTPATIENT
Start: 2025-02-10 | End: 2025-02-10 | Stop reason: HOSPADM

## 2025-02-10 RX ORDER — OXYCODONE HYDROCHLORIDE 5 MG/1
5 TABLET ORAL EVERY 6 HOURS PRN
Qty: 30 TABLET | Refills: 0 | Status: SHIPPED | OUTPATIENT
Start: 2025-02-10 | End: 2025-02-21 | Stop reason: SDUPTHER

## 2025-02-10 RX ORDER — HYDROCHLOROTHIAZIDE 25 MG/1
25 TABLET ORAL DAILY
Status: DISCONTINUED | OUTPATIENT
Start: 2025-02-10 | End: 2025-02-10

## 2025-02-10 RX ORDER — HYDRALAZINE HYDROCHLORIDE 25 MG/1
25 TABLET, FILM COATED ORAL EVERY 8 HOURS PRN
Status: DISCONTINUED | OUTPATIENT
Start: 2025-02-10 | End: 2025-02-11 | Stop reason: HOSPADM

## 2025-02-10 RX ORDER — SODIUM CHLORIDE, SODIUM LACTATE, POTASSIUM CHLORIDE, CALCIUM CHLORIDE 600; 310; 30; 20 MG/100ML; MG/100ML; MG/100ML; MG/100ML
125 INJECTION, SOLUTION INTRAVENOUS CONTINUOUS
Status: DISCONTINUED | OUTPATIENT
Start: 2025-02-10 | End: 2025-02-11 | Stop reason: HOSPADM

## 2025-02-10 RX ORDER — OXYCODONE HYDROCHLORIDE 10 MG/1
10 TABLET ORAL EVERY 4 HOURS PRN
Status: DISCONTINUED | OUTPATIENT
Start: 2025-02-10 | End: 2025-02-11 | Stop reason: HOSPADM

## 2025-02-10 RX ORDER — GABAPENTIN 300 MG/1
600 CAPSULE ORAL
Status: DISCONTINUED | OUTPATIENT
Start: 2025-02-10 | End: 2025-02-11 | Stop reason: HOSPADM

## 2025-02-10 RX ORDER — CHLORHEXIDINE GLUCONATE ORAL RINSE 1.2 MG/ML
15 SOLUTION DENTAL ONCE
Status: COMPLETED | OUTPATIENT
Start: 2025-02-10 | End: 2025-02-10

## 2025-02-10 RX ORDER — ACETAMINOPHEN 500 MG
1000 TABLET ORAL EVERY 6 HOURS PRN
Qty: 90 TABLET | Refills: 0 | Status: SHIPPED | OUTPATIENT
Start: 2025-02-10

## 2025-02-10 RX ORDER — DOCUSATE SODIUM 100 MG/1
100 CAPSULE, LIQUID FILLED ORAL 2 TIMES DAILY
Status: DISCONTINUED | OUTPATIENT
Start: 2025-02-10 | End: 2025-02-11 | Stop reason: HOSPADM

## 2025-02-10 RX ORDER — IPRATROPIUM BROMIDE 21 UG/1
2 SPRAY, METERED NASAL EVERY 12 HOURS
Status: DISCONTINUED | OUTPATIENT
Start: 2025-02-10 | End: 2025-02-10 | Stop reason: RX

## 2025-02-10 RX ORDER — FENTANYL CITRATE/PF 50 MCG/ML
25 SYRINGE (ML) INJECTION
Status: DISCONTINUED | OUTPATIENT
Start: 2025-02-10 | End: 2025-02-10 | Stop reason: HOSPADM

## 2025-02-10 RX ORDER — TRANEXAMIC ACID 10 MG/ML
1000 INJECTION, SOLUTION INTRAVENOUS ONCE
Status: COMPLETED | OUTPATIENT
Start: 2025-02-10 | End: 2025-02-10

## 2025-02-10 RX ORDER — MIDAZOLAM HYDROCHLORIDE 2 MG/2ML
INJECTION, SOLUTION INTRAMUSCULAR; INTRAVENOUS AS NEEDED
Status: DISCONTINUED | OUTPATIENT
Start: 2025-02-10 | End: 2025-02-10

## 2025-02-10 RX ORDER — HYDROMORPHONE HCL/PF 1 MG/ML
0.5 SYRINGE (ML) INJECTION EVERY 2 HOUR PRN
Status: DISCONTINUED | OUTPATIENT
Start: 2025-02-10 | End: 2025-02-11 | Stop reason: HOSPADM

## 2025-02-10 RX ORDER — CALCIUM CARBONATE 500 MG/1
1000 TABLET, CHEWABLE ORAL DAILY PRN
Status: DISCONTINUED | OUTPATIENT
Start: 2025-02-10 | End: 2025-02-11 | Stop reason: HOSPADM

## 2025-02-10 RX ORDER — GABAPENTIN 100 MG/1
100 CAPSULE ORAL EVERY 8 HOURS
Status: DISCONTINUED | OUTPATIENT
Start: 2025-02-10 | End: 2025-02-11 | Stop reason: HOSPADM

## 2025-02-10 RX ORDER — FENTANYL CITRATE 50 UG/ML
INJECTION, SOLUTION INTRAMUSCULAR; INTRAVENOUS AS NEEDED
Status: DISCONTINUED | OUTPATIENT
Start: 2025-02-10 | End: 2025-02-10

## 2025-02-10 RX ORDER — MAGNESIUM HYDROXIDE 1200 MG/15ML
LIQUID ORAL AS NEEDED
Status: DISCONTINUED | OUTPATIENT
Start: 2025-02-10 | End: 2025-02-10 | Stop reason: HOSPADM

## 2025-02-10 RX ORDER — TIZANIDINE 2 MG/1
2 TABLET ORAL EVERY 8 HOURS PRN
Qty: 60 TABLET | Refills: 0 | Status: SHIPPED | OUTPATIENT
Start: 2025-02-10 | End: 2025-02-21 | Stop reason: SDUPTHER

## 2025-02-10 RX ORDER — ONDANSETRON 2 MG/ML
4 INJECTION INTRAMUSCULAR; INTRAVENOUS EVERY 6 HOURS PRN
Status: DISCONTINUED | OUTPATIENT
Start: 2025-02-10 | End: 2025-02-11 | Stop reason: HOSPADM

## 2025-02-10 RX ORDER — DEXAMETHASONE SODIUM PHOSPHATE 10 MG/ML
INJECTION, SOLUTION INTRAMUSCULAR; INTRAVENOUS AS NEEDED
Status: DISCONTINUED | OUTPATIENT
Start: 2025-02-10 | End: 2025-02-10

## 2025-02-10 RX ORDER — ONDANSETRON 2 MG/ML
4 INJECTION INTRAMUSCULAR; INTRAVENOUS ONCE AS NEEDED
Status: DISCONTINUED | OUTPATIENT
Start: 2025-02-10 | End: 2025-02-10 | Stop reason: HOSPADM

## 2025-02-10 RX ORDER — PROPOFOL 10 MG/ML
INJECTION, EMULSION INTRAVENOUS AS NEEDED
Status: DISCONTINUED | OUTPATIENT
Start: 2025-02-10 | End: 2025-02-10

## 2025-02-10 RX ORDER — BUPIVACAINE HYDROCHLORIDE 5 MG/ML
INJECTION, SOLUTION EPIDURAL; INTRACAUDAL
Status: COMPLETED | OUTPATIENT
Start: 2025-02-10 | End: 2025-02-10

## 2025-02-10 RX ORDER — HYDROMORPHONE HCL/PF 1 MG/ML
0.5 SYRINGE (ML) INJECTION
Status: DISCONTINUED | OUTPATIENT
Start: 2025-02-10 | End: 2025-02-10 | Stop reason: HOSPADM

## 2025-02-10 RX ORDER — LABETALOL HYDROCHLORIDE 5 MG/ML
5 INJECTION, SOLUTION INTRAVENOUS
Status: DISCONTINUED | OUTPATIENT
Start: 2025-02-10 | End: 2025-02-10 | Stop reason: HOSPADM

## 2025-02-10 RX ADMIN — DEXAMETHASONE SODIUM PHOSPHATE 10 MG: 10 INJECTION INTRAMUSCULAR; INTRAVENOUS at 10:15

## 2025-02-10 RX ADMIN — DOCUSATE SODIUM 100 MG: 100 CAPSULE, LIQUID FILLED ORAL at 17:45

## 2025-02-10 RX ADMIN — OXYCODONE HYDROCHLORIDE 5 MG: 5 TABLET ORAL at 18:41

## 2025-02-10 RX ADMIN — SODIUM CHLORIDE, SODIUM LACTATE, POTASSIUM CHLORIDE, AND CALCIUM CHLORIDE: .6; .31; .03; .02 INJECTION, SOLUTION INTRAVENOUS at 10:51

## 2025-02-10 RX ADMIN — PROPOFOL 30 MG: 10 INJECTION, EMULSION INTRAVENOUS at 10:24

## 2025-02-10 RX ADMIN — PHENYLEPHRINE HYDROCHLORIDE 30 MCG/MIN: 10 INJECTION INTRAVENOUS at 10:14

## 2025-02-10 RX ADMIN — MIDAZOLAM 2 MG: 1 INJECTION INTRAMUSCULAR; INTRAVENOUS at 08:45

## 2025-02-10 RX ADMIN — GABAPENTIN 100 MG: 100 CAPSULE ORAL at 22:13

## 2025-02-10 RX ADMIN — SODIUM CHLORIDE, SODIUM LACTATE, POTASSIUM CHLORIDE, AND CALCIUM CHLORIDE 125 ML/HR: .6; .31; .03; .02 INJECTION, SOLUTION INTRAVENOUS at 13:42

## 2025-02-10 RX ADMIN — OXYCODONE HYDROCHLORIDE 10 MG: 10 TABLET ORAL at 13:41

## 2025-02-10 RX ADMIN — METHOCARBAMOL 500 MG: 500 TABLET ORAL at 13:41

## 2025-02-10 RX ADMIN — ONDANSETRON 4 MG: 2 INJECTION INTRAMUSCULAR; INTRAVENOUS at 10:15

## 2025-02-10 RX ADMIN — FENTANYL CITRATE 25 MCG: 50 INJECTION INTRAMUSCULAR; INTRAVENOUS at 10:14

## 2025-02-10 RX ADMIN — METHOCARBAMOL 500 MG: 500 TABLET ORAL at 18:41

## 2025-02-10 RX ADMIN — CHLORHEXIDINE GLUCONATE 15 ML: 1.2 SOLUTION ORAL at 08:28

## 2025-02-10 RX ADMIN — MEPIVACAINE HYDROCHLORIDE 3.5 ML: 15 INJECTION, SOLUTION EPIDURAL; INFILTRATION at 10:08

## 2025-02-10 RX ADMIN — ATORVASTATIN CALCIUM 40 MG: 40 TABLET, FILM COATED ORAL at 17:45

## 2025-02-10 RX ADMIN — SODIUM CHLORIDE, SODIUM LACTATE, POTASSIUM CHLORIDE, AND CALCIUM CHLORIDE 125 ML/HR: .6; .31; .03; .02 INJECTION, SOLUTION INTRAVENOUS at 08:28

## 2025-02-10 RX ADMIN — PROPOFOL 80 MCG/KG/MIN: 10 INJECTION, EMULSION INTRAVENOUS at 10:12

## 2025-02-10 RX ADMIN — GABAPENTIN 600 MG: 300 CAPSULE ORAL at 22:13

## 2025-02-10 RX ADMIN — BUPIVACAINE HYDROCHLORIDE 5 ML: 5 INJECTION, SOLUTION EPIDURAL; INTRACAUDAL; PERINEURAL at 08:48

## 2025-02-10 RX ADMIN — ENOXAPARIN SODIUM 40 MG: 40 INJECTION SUBCUTANEOUS at 22:13

## 2025-02-10 RX ADMIN — PROPOFOL 50 MG: 10 INJECTION, EMULSION INTRAVENOUS at 10:11

## 2025-02-10 RX ADMIN — GABAPENTIN 100 MG: 100 CAPSULE ORAL at 13:40

## 2025-02-10 RX ADMIN — ACETAMINOPHEN 325MG 975 MG: 325 TABLET ORAL at 13:40

## 2025-02-10 RX ADMIN — BUPIVACAINE 20 ML: 13.3 INJECTION, SUSPENSION, LIPOSOMAL INFILTRATION at 08:48

## 2025-02-10 RX ADMIN — VANCOMYCIN HYDROCHLORIDE 1500 MG: 1 INJECTION, POWDER, LYOPHILIZED, FOR SOLUTION INTRAVENOUS at 10:05

## 2025-02-10 RX ADMIN — TRANEXAMIC ACID 1000 MG: 10 INJECTION, SOLUTION INTRAVENOUS at 10:11

## 2025-02-10 RX ADMIN — VANCOMYCIN HYDROCHLORIDE 1000 MG: 1 INJECTION, POWDER, LYOPHILIZED, FOR SOLUTION INTRAVENOUS at 22:12

## 2025-02-10 NOTE — ANESTHESIA PROCEDURE NOTES
Spinal Block    Staffing  Performed by: Esperanza Childs CRNA  Authorized by: Juan David Hughes MD
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
## 2025-02-10 NOTE — ASSESSMENT & PLAN NOTE
Hold ACEI/ARB/diuretics to decrease risk of post operative JAKE; post op add hydralazine prn for elevated blood pressures. May resume these meds upon discharge  Hydralazine for any accelerated HTN

## 2025-02-10 NOTE — OP NOTE
OPERATIVE REPORT  PATIENT NAME: Shiva Shabazz  : 1955  MRN: [id number]  Pt Location:  [facility] OR ROOM 04    Surgery Date: 2/10/2025    Surgeons and Role:     * Deep Murdock MD - Primary     * Luz Marshall PA-C - Assisting     Preop Diagnosis:  Primary osteoarthritis of right knee [M17.11]    Post-Op Diagnosis Codes:     * Primary osteoarthritis of right knee [M17.11]    Procedure(s):  Right - ARTHROPLASTY KNEE TOTAL W ROBOT    Specimens:  * No specimens in log *    Estimated Blood Loss:   Minimal    Drains:  Closed/Suction Drain Anterior;Right Knee Accordion 18 Fr. (Active)   Number of days: 0       Urethral Catheter Non-latex 16 Fr. (Active)   Number of days: 0       Anesthesia Type:   Choice     Operative Indications:  Primary osteoarthritis of right knee [M17.11]    Operative Findings:  Depuy attune   Femur-5   Poly-5   Tibia-5   Patella-35    Complications:   None    Knee Technique: Suture (direct) Repair  Knee Approach: Medial Parapatellar        Procedure and Technique:  Following the induction of adequate level of spinal anesthesia, a Eldridge catheter was then sterilely introduced into this patient's bladder.  Antibiotics administered.  The right thigh was not fitted with a thigh-high tourniquet.  The right lower extremity then underwent sterile prep and drape.  The right lower extremity was exsanguinated to gravity, the tourniquet inflated to 300 mmHg.  A midline knee incision was carried the knee in flexion.  Full-thickness flaps were raised when I accessed the extensor mechanism.  A medial arthrotomy was created to open up the knee joint.  2 half pins were placed within the proximal tibia, 2 half pins were placed within the distal femur.  In doing so, modules were created.  The arrays were then attached to the modules.  Checkpoints made the proximal tibia distal femur.  The knee was then registered.  The surgery was planned out on the computer, the plan was finalized.  The robot was  docked.  The first maneuver involve the distal femoral cut followed by the anterior posterior cuts.  The chamfer cuts completed the process.  The proximal tibia cut was made next.  Care was taken reserved and taken the medial collateral, lateral collateral, posterior structures during these maneuvers.  The box cut was then made for the posterior stabilized unit, remnant medial and lateral meniscectomies then performed.  Trials were inserted, the knee was taken through range of motion, found to be capable full extension, good flexion, stable throughout.  The patella was then resurfaced while utilizing manufactures equipment, it was found to be a size 35 mm button.  The trial components removed and the knee was prepared for insertion of cemented components.  The cemented tibia, cemented femur, trial poly-, cemented patella then placed.  Excess cement was removed, and the knee was brought to extension.  The cement was allowed to cure.  The trial poly was taken out, the knee was packed off.  The tourniquet was deflated, hemostasis was secured.  The insert polyethylene was then snapped into position.  The knee was taken through a final range of motion, found to be capable full extension, good flexion, stable throughout, and excellent patella tracking.  Satisfied with the extent of surgery, the wound was then flushed with saline and closed.  A Betadine soak initiated.  A drain is placed deep brought via separate lateral stab incision.  The arthrotomy was closed with number Vicryl suture.  The subcu tissues were closed with 2-0 Vicryl suture.  The skin was then closed with staples.  Sterile dressings were applied.  She was then transferred to recovery room in stable condition with plans to include physical therapy for weightbearing to tolerance.  She will require DVT prophylaxis with Lovenox.  Please note, there is no qualified orthopedic resident was available to assist, the assistance of Luz Marshall PA-C was  instrumental in the safe execution of this patient surgery.  Start the patient position, patient prepped draped, IntraOp assistance, wound closure, dressing application, patient transfers, all of these were performed under my direct supervision   I was present for the entire procedure.    Patient Disposition:  PACU             SIGNATURE: Deep Murdock MD  DATE: February 10, 2025  TIME: 11:15 AM

## 2025-02-10 NOTE — PLAN OF CARE
Problem: PAIN - ADULT  Goal: Verbalizes/displays adequate comfort level or baseline comfort level  Description: Interventions:  - Encourage patient to monitor pain and request assistance  - Assess pain using appropriate pain scale  - Administer analgesics based on type and severity of pain and evaluate response  - Implement non-pharmacological measures as appropriate and evaluate response  - Consider cultural and social influences on pain and pain management  - Notify physician/advanced practitioner if interventions unsuccessful or patient reports new pain  Outcome: Progressing     Problem: INFECTION - ADULT  Goal: Absence or prevention of progression during hospitalization  Description: INTERVENTIONS:  - Assess and monitor for signs and symptoms of infection  - Monitor lab/diagnostic results  - Monitor all insertion sites, i.e. indwelling lines, tubes, and drains  - Monitor endotracheal if appropriate and nasal secretions for changes in amount and color  - [place] appropriate cooling/warming therapies per order  - Administer medications as ordered  - Instruct and encourage patient and family to use good hand hygiene technique  - Identify and instruct in appropriate isolation precautions for identified infection/condition  Outcome: Progressing  Goal: Absence of fever/infection during neutropenic period  Description: INTERVENTIONS:  - Monitor WBC    Outcome: Progressing     Problem: SAFETY ADULT  Goal: Patient will remain free of falls  Description: INTERVENTIONS:  - Educate patient/family on patient safety including physical limitations  - Instruct patient to call for assistance with activity   - Consult OT/PT to assist with strengthening/mobility   - Keep Call bell within reach  - Keep bed low and locked with side rails adjusted as appropriate  - Keep care items and personal belongings within reach  - Initiate and maintain comfort rounds  - Make Fall Risk Sign visible to staff  - Offer Toileting every 2 Hours,  in advance of need  - Initiate/Maintain bed/chair alarm  - Obtain necessary fall risk management equipment: rolling walker  - Apply yellow socks and bracelet for high fall risk patients  - Consider moving patient to room near nurses station  Outcome: Progressing  Goal: Maintain or return to baseline ADL function  Description: INTERVENTIONS:  -  Assess patient's ability to carry out ADLs; assess patient's baseline for ADL function and identify physical deficits which impact ability to perform ADLs (bathing, care of mouth/teeth, toileting, grooming, dressing, etc.)  - Assess/evaluate cause of self-care deficits   - Assess range of motion  - Assess patient's mobility; develop plan if impaired  - Assess patient's need for assistive devices and provide as appropriate  - Encourage maximum independence but intervene and supervise when necessary  - Involve family in performance of ADLs  - Assess for home care needs following discharge   - Consider OT consult to assist with ADL evaluation and planning for discharge  - Provide patient education as appropriate  Outcome: Progressing  Goal: Maintains/Returns to pre admission functional level  Description: INTERVENTIONS:  - Perform AM-PAC 6 Click Basic Mobility/ Daily Activity assessment daily.  - Set and communicate daily mobility goal to care team and patient/family/caregiver.   - Collaborate with rehabilitation services on mobility goals if consulted  - Perform Range of Motion 3 times a day.  - Reposition patient every 2 hours.  - Dangle patient 3 times a day  - Stand patient 3 times a day  - Ambulate patient 3 times a day  - Out of bed to chair 3 times a day   - Out of bed for meals 3 times a day  - Out of bed for toileting  - Record patient progress and toleration of activity level   Outcome: Progressing     Problem: DISCHARGE PLANNING  Goal: Discharge to home or other facility with appropriate resources  Description: INTERVENTIONS:  - Identify barriers to discharge  w/patient and caregiver  - Arrange for needed discharge resources and transportation as appropriate  - Identify discharge learning needs (meds, wound care, etc.)  - Arrange for interpretive services to assist at discharge as needed  - Refer to Case Management Department for coordinating discharge planning if the patient needs post-hospital services based on physician/advanced practitioner order or complex needs related to functional status, cognitive ability, or social support system  Outcome: Progressing     Problem: Knowledge Deficit  Goal: Patient/family/caregiver demonstrates understanding of disease process, treatment plan, medications, and discharge instructions  Description: Complete learning assessment and assess knowledge base.  Interventions:  - Provide teaching at level of understanding  - Provide teaching via preferred learning methods  Outcome: Progressing
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
## 2025-02-10 NOTE — PLAN OF CARE
Problem: PHYSICAL THERAPY ADULT  Goal: Performs mobility at highest level of function for planned discharge setting.  See evaluation for individualized goals.  Description: Treatment/Interventions: Functional transfer training, LE strengthening/ROM, Elevations, Therapeutic exercise, Endurance training, Patient/family training, Gait training, Bed mobility, Spoke to nursing, OT  Equipment Recommended: Walker (pt owns)       See flowsheet documentation for full assessment, interventions and recommendations.  Note: Prognosis: Good  Problem List: Decreased strength, Impaired balance, Decreased endurance, Decreased range of motion, Decreased mobility, Pain  Assessment: Pt. 69 y.o.female presents for elective surgery. Past medical hx includes HTN, polymyalgia, osteopenia. Pt admitted for Primary osteoarthritis of right knee w/ Primary osteoarthritis of right knee (M17.11). S/p R elective TKR POD #0. Pt referred to PT for functional mobility evaluation & D/C planning w/ orders of activity as tolerated. WBAT RLE. PTA, pt reports being I w/o AD. Personal factors affecting pt at time of IE include: bed/bath on 2nd floor, decreased independence in ADLs/IADLs, steps to enter, two story home, and use of AD . During evaluation, deficits included dec mobility, balance, ambulation. Required S for supine to sit, modAx1 for sit to stand from bed, minAx1 for ambulation progressing to S during treatment session, and S for toilet transfers. Use of BSC over standard toilet. See treatment assessment for further functional mobility. Pt demonstrated dec endurance and tolerance to activity. Denies reports of dizziness or SOB t/o session. Pt was educated on fall precautions and reinforced w/ good understanding. Pt would benefit from continued PT to address deficits as defined above and maximize level of independence with functional mobility and safety. Based on pt presentation and impaired function, pt would benefit from level III, (minimum  resource intensity) at D/C. The patient's AM-PAC Basic Mobility Inpatient Short Form Raw Score is 20. A Raw score of greater than 16 suggests the patient may benefit from discharge to home. Please also refer to the recommendation of the Physical Therapist for safe discharge planning. Nsg staff to continue to mobilized pt (OOB in chair for all meals & ambulate in room/unit) as tolerated to prevent further decline in function. Nsg notified. Co-eval performed to complete this PT evaluation for the pts best interest given pts medical complexity and functional level.  Barriers to Discharge: Inaccessible home environment  Barriers to Discharge Comments: NIEVES  Rehab Resource Intensity Level, PT: III (Minimum Resource Intensity)    See flowsheet documentation for full assessment.

## 2025-02-10 NOTE — ANESTHESIA PROCEDURE NOTES
Peripheral Block    Patient location during procedure: holding area  Start time: 2/10/2025 8:40 AM  Reason for block: at surgeon's request and post-op pain management  Staffing  Performed by: Juan David Hughes MD  Authorized by: Juan David Hughes MD    Preanesthetic Checklist  Completed: patient identified, IV checked, site marked, risks and benefits discussed, surgical consent, monitors and equipment checked, pre-op evaluation and timeout performed  Peripheral Block  Patient position: supine  Prep: ChloraPrep  Patient monitoring: frequent blood pressure checks, continuous pulse oximetry and heart rate  Block type: Adductor Canal  Laterality: right  Injection technique: single-shot  Procedures: ultrasound guided, Ultrasound guidance required for the procedure to increase accuracy and safety of medication placement and decrease risk of complications.  Ultrasound permanent image saved  bupivacaine (PF) (MARCAINE) 0.5 % injection 20 mL - Perineural   5 mL - 2/10/2025 8:48:00 AM  bupivacaine liposomal (EXPAREL) 1.3 % injection 20 mL - Perineural   20 mL - 2/10/2025 8:48:00 AM  Needle  Needle type: Stimuplex   Needle gauge: 20 G  Needle length: 4 in  Needle localization: anatomical landmarks and ultrasound guidance  Needle insertion depth: 5 cm  Assessment  Injection assessment: incremental injection, frequent aspiration, injected with ease, negative aspiration, negative for heart rate change, no paresthesia on injection, no symptoms of intraneural/intravenous injection and needle tip visualized at all times  Paresthesia pain: none  Post-procedure:  site cleaned  patient tolerated the procedure well with no immediate complications

## 2025-02-10 NOTE — ANESTHESIA POSTPROCEDURE EVALUATION
Post-Op Assessment Note    CV Status:  Stable    Pain management: adequate       Mental Status:  Alert and awake   Hydration Status:  Euvolemic   PONV Controlled:  Controlled   Airway Patency:  Patent     Post Op Vitals Reviewed: Yes    No anethesia notable event occurred.    Staff: Anesthesiologist           Last Filed PACU Vitals:  Vitals Value Taken Time   Temp 97.9 °F (36.6 °C) 02/10/25 1323   Pulse 77 02/10/25 1325   /94 02/10/25 1324   Resp 16 02/10/25 1323   SpO2 95 % 02/10/25 1325   Vitals shown include unfiled device data.    Modified Andrea:     Vitals Value Taken Time   Activity 2 02/10/25 1300   Respiration 2 02/10/25 1300   Circulation 2 02/10/25 1300   Consciousness 2 02/10/25 1300   Oxygen Saturation 2 02/10/25 1300     Modified Andrea Score: 10

## 2025-02-10 NOTE — PLAN OF CARE
Problem: PHYSICAL THERAPY ADULT  Goal: Performs mobility at highest level of function for planned discharge setting.  See evaluation for individualized goals.  Description: Treatment/Interventions: Functional transfer training, LE strengthening/ROM, Elevations, Therapeutic exercise, Endurance training, Patient/family training, Gait training, Bed mobility, Spoke to nursing, OT  Equipment Recommended: Walker (pt owns)       See flowsheet documentation for full assessment, interventions and recommendations.  2/10/2025 1658 by Shaye Garcia PT  Outcome: Progressing  Note: Prognosis: Good  Problem List: Decreased strength, Impaired balance, Decreased endurance, Decreased range of motion, Decreased mobility, Pain  Assessment: Patient was seen today per POC. Overall, pt demonstrated improved mobility and inc tolerance to activity. Pain 4/10 in R knee. Required S for sit<>stand, toilet transfers, and ambulation. Pt able to ambulate 100' and 10' with RW and S in unit. Antalgic step through gait with no gross LOB noted. Good tolerance to therapeutic exercises with appropriate fatigue. Reviewed HEP and provided handout. All questions and concerns addressed at this time. No reports of dizziness t/o session. Will continue to see pt per POC as tolerated. From PT standpoint continued recommendation for level III, (minimum resource intensity) at D/C when medically cleared based current function. The patient's AM-PAC Basic Mobility Inpatient Short Form Raw Score is 22. A Raw score of greater than 16 suggests the patient may benefit from discharge to home. Please also refer to the recommendation of the Physical Therapist for safe discharge planning. Nsg staff to continue to mobilized pt (OOB in chair for all meals & ambulate in room/unit) as tolerated to prevent further decline in function. Ns staff notified.  Barriers to Discharge: Inaccessible home environment  Barriers to Discharge Comments: NIEVES  Rehab Resource Intensity  Physical Therapy     Referred by: Jeanne L Pallagi, MD; Medical Diagnosis (from order):    Diagnosis Information      Diagnosis    724.4 (ICD-9-CM) - M54.16 (ICD-10-CM) - Left lumbar radiculopathy                Daily Treatment Note    Visit:  7     SUBJECTIVE                                                                                                               No pain now left buttocks    n/c back pain   Better but not gone, stairs are feeling better   Bending over can still hurt , sore for the next day , still some trouble sitting 20 minute drive   Having an MRI but I'm concerned it won't include my left hip and that's where my pain is     OBJECTIVE                                                                                                                     Range of Motion (ROM)   (degrees unless noted; active unless noted; norms in ( ); negative=lacking to 0, positive=beyond 0)   Lumbar:    - Flexion (60-80):  WNL and pain (Feels a pull into the back of the left leg )     - Extension (25):  WNL     - Side Bend (25-35):        • Left:  WNL         • Right:  WNL     Strength  (out of 5 unless noted, standard test position unless noted, lbs tested with hand held dynamometer)   Hip:    - Abduction:        • Left: 5      Palpation:   Left Lower Extremity: Gluteus Medius: tender;     Special Tests:  Straight Leg Raise: Left: positive Right: negative  Comments / Details:   Positive \"Jessika\" test on the left side    TREATMENT                                                                                                                  Therapeutic Exercise:  Neurodynamic mobilization     Kick you hat off x 10 add to HEP instructed to discontinue if pain flares, states she had this exercise but didn't have a picture so she forgot   Bird dog x 5  Progression x 5 senthil progress for HEP   Side step green band 2x10 senthil add to HEP  Monster walk green band 2x10 forward/retor add to HEP       Therapeutic  Activity:        Activities of Daily Living/Self Care:  Educated patient on likely cause of pain being related to dural tension  Assessed left hip, no ROM limit, no c/o pain  Slump + on left     Dry Needling:      Skilled input: verbal instruction/cues and as detailed above    Writer verbally educated and received verbal consent for hand placement, positioning of patient, and techniques to be performed today from patient for hand placement and palpation for techniques and modality application as described above and how they are pertinent to the patient's plan of care.    Home Exercise Program/Education Materials:   Ball bridge Le lift , bridge hooklying LE lift   4 point diagonal lift progression    March in place   Hip abd standing can progress to side step  Fire hydrant   Side step green band   Monster walk green band   Kick your hat off                  Mechanical Traction:  Mechanical Lumbar Traction (68840)    intermittent Pounds: 40 Seconds (hold/relax): 45/10  Duration: 15 minutes  Negative slump after traction   Results: decreased pain  Reaction: no adverse reaction to treatment      ASSESSMENT                                                                                                             Patient continues to show improvement with current POC,  She reports her symptoms are better but not gone   Continued dural tension left LE with slump and SLR   Patient Education:   Results of above outlined education: Verbalizes understanding and Demonstrates understanding      PLAN                                                                                                                           Suggestions for next session as indicated: Progress per plan of care  Traction if beneficial  Progress core strengthening          Therapy procedure time and total treatment time can be found documented on the Time Entry flowsheet   Level, PT: III (Minimum Resource Intensity)    See flowsheet documentation for full assessment.
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
## 2025-02-10 NOTE — PHYSICAL THERAPY NOTE
PT PROGRESS NOTE    Name: Shiva Shabazz  AGE: 69 y.o.  MRN: [id number]  LENGTH OF STAY: 0     02/10/25 1649   PT Last Visit   PT Visit Date 02/10/25   Note Type   Note Type BID visit/treatment   Pain Assessment   Pain Assessment Tool 0-10   Pain Score 4   Pain Location/Orientation Orientation: Right;Location: Knee   Hospital Pain Intervention(s) Cold applied;Repositioned;Ambulation/increased activity;Elevated;Emotional support;Rest   Restrictions/Precautions   Weight Bearing Precautions Per Order Yes   RLE Weight Bearing Per Order WBAT   Other Precautions Chair Alarm;Bed Alarm;WBS;Multiple lines;Fall Risk;Pain  (hemovac)   General   Chart Reviewed Yes   Response to Previous Treatment Patient with no complaints from previous session.   Family/Caregiver Present No   Cognition   Overall Cognitive Status WFL   Arousal/Participation Alert;Cooperative   Attention Within functional limits   Orientation Level Oriented X4   Following Commands Follows all commands and directions without difficulty   Subjective   Subjective I am ready to walk again.   Bed Mobility   Additional Comments Pt greeted OOB in chair.   Transfers   Sit to Stand 5  Supervision   Additional items Armrests;Increased time required;Verbal cues  (w/ RW)   Stand to Sit 5  Supervision   Additional items Armrests;Increased time required;Verbal cues  (w/ RW)   Toilet transfer 5  Supervision   Additional items Increased time required;Verbal cues;Commode  (w/ RW; BSC over standard toilet.)   Additional Comments cues for hand placement   Ambulation/Elevation   Gait pattern Improper Weight shift;Antalgic;Step through pattern   Gait Assistance 5  Supervision   Additional items Verbal cues   Assistive Device Rolling walker   Distance 100'x1; 10'x1   Ambulation/Elevation Additional Comments cues for turning with RW   Balance   Static Sitting Good   Dynamic Sitting Fair +   Static Standing Fair  (w/ RW)   Dynamic Standing Fair -  (w/ RW)   Ambulatory Fair  -  (w/ RW)   Activity Tolerance   Activity Tolerance Patient tolerated treatment well   Nurse Made Aware RN Ajit   Exercises   Knee AROM Long Arc Quad Sitting;15 reps;AROM;Bilateral   TKR   (Reviewed HEP and provided handout. All questions and concerns addressed at this time.)   Assessment   Prognosis Good   Problem List Decreased strength;Impaired balance;Decreased endurance;Decreased range of motion;Decreased mobility;Pain   Assessment Patient was seen today per POC. Overall, pt demonstrated improved mobility and inc tolerance to activity. Pain 4/10 in R knee. Required S for sit<>stand, toilet transfers, and ambulation. Pt able to ambulate 100' and 10' with RW and S in unit. Antalgic step through gait with no gross LOB noted. Good tolerance to therapeutic exercises with appropriate fatigue. Reviewed HEP and provided handout. All questions and concerns addressed at this time. No reports of dizziness t/o session. Will continue to see pt per POC as tolerated. From PT standpoint continued recommendation for level III, (minimum resource intensity) at D/C when medically cleared based current function. The patient's AM-PAC Basic Mobility Inpatient Short Form Raw Score is 22. A Raw score of greater than 16 suggests the patient may benefit from discharge to home. Please also refer to the recommendation of the Physical Therapist for safe discharge planning. Nsg staff to continue to mobilized pt (OOB in chair for all meals & ambulate in room/unit) as tolerated to prevent further decline in function. [facility] staff notified.   Barriers to Discharge Inaccessible home environment   Goals   Patient Goals to walk   STG Expiration Date 02/17/25   PT Treatment Day 1   Plan   Treatment/Interventions Functional transfer training;Elevations;LE strengthening/ROM;Therapeutic exercise;Endurance training;Patient/family training;Bed mobility;Gait training;Spoke to nursing;OT   Progress Progressing toward goals   PT Frequency Twice a  day  (PRN)   Discharge Recommendation   Rehab Resource Intensity Level, PT III (Minimum Resource Intensity)   AM-PAC Basic Mobility Inpatient   Turning in Flat Bed Without Bedrails 4   Lying on Back to Sitting on Edge of Flat Bed Without Bedrails 4   Moving Bed to Chair 4   Standing Up From Chair Using Arms 4   Walk in Room 3   Climb 3-5 Stairs With Railing 3   Basic Mobility Inpatient Raw Score 22   Basic Mobility Standardized Score 47.4   [facility] Highest Level Of Mobility   -HLM Goal 7: Walk 25 feet or more   -HLM Achieved 7: Walk 25 feet or more   End of Consult   Patient Position at End of Consult Bedside chair;Bed/Chair alarm activated;All needs within reach       Shaye Garcia, PT

## 2025-02-10 NOTE — ANESTHESIA PROCEDURE NOTES
Spinal Block    Patient location during procedure: OR  Start time: 2/10/2025 10:08 AM  Reason for block: procedure for pain and at surgeon's request  Staffing  Performed by: Esperanza Childs CRNA  Authorized by: Juan David Hughes MD    Preanesthetic Checklist  Completed: patient identified, IV checked, site marked, risks and benefits discussed, surgical consent, monitors and equipment checked, pre-op evaluation and timeout performed  Spinal Block  Patient position: sitting  Prep: ChloraPrep and site prepped and draped  Patient monitoring: frequent blood pressure checks, continuous pulse ox and heart rate  Approach: midline  Location: L3-4  Needle  Needle type: Pencan   Needle gauge: 24 G  Needle length: 4 in  Assessment  Sensory level: T4  Injection Assessment:  negative aspiration for heme, no paresthesia on injection and positive aspiration for clear CSF.  Post-procedure:  site cleaned

## 2025-02-10 NOTE — ANESTHESIA PREPROCEDURE EVALUATION
Procedure:  ARTHROPLASTY KNEE TOTAL W ROBOT (Right: Knee)    Relevant Problems   ANESTHESIA (within normal limits)   (-) History of anesthesia complications      CARDIO   (+) Essential hypertension      ENDO   (+) Acquired hypothyroidism   (+) Parathyroid adenoma      GI/HEPATIC  Confirmed NPO appropriate  s/p bowel prep      /RENAL (within normal limits)      HEMATOLOGY (within normal limits)      MUSCULOSKELETAL   (+) Bilateral post-traumatic osteoarthritis of knee   (+) Chronic right-sided low back pain with right-sided sciatica   (+) Primary osteoarthritis of right knee   (+) Rheumatoid arthritis of multiple sites with negative rheumatoid factor (HCC) (on plaquenil, steroid taper in December for flare)      NEURO/PSYCH   (+) Chronic right-sided low back pain with right-sided sciatica      PULMONARY (within normal limits)   (-) Smoking   (-) URI (upper respiratory infection)        Physical Exam    Airway    Mallampati score: II  TM Distance: >3 FB  Neck ROM: full     Dental   No notable dental hx     Cardiovascular  Rhythm: regular, Rate: normal    Pulmonary   Breath sounds clear to auscultation    Other Findings  post-pubertal.      Anesthesia Plan  ASA Score- 3     Anesthesia Type- spinal with ASA Monitors.         Additional Monitors:     Airway Plan:     Comment: Patient seen and examined.  History reviewed.  Adductor canal nerve block to be done pre-op for post-op pain.  Spinal to be done in OR with sedation during the case.  Plan discussed with the patient.  Risks explained, consent obtained.  .       Plan Factors-Exercise tolerance (METS): >4 METS.Exercise comment: Walks regularly, uses cane.    Chart reviewed. EKG reviewed.  Existing labs reviewed. Patient summary reviewed.    Patient is not a current smoker.              Induction- intravenous.    Postoperative Plan- Plan for postoperative opioid use.         Informed Consent- Anesthetic plan and risks discussed with patient.  I personally reviewed  this patient with the CRNA. Discussed and agreed on the Anesthesia Plan with the CRNA..
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
## 2025-02-10 NOTE — PLAN OF CARE
Problem: OCCUPATIONAL THERAPY ADULT  Goal: Performs self-care activities at highest level of function for planned discharge setting.  See evaluation for individualized goals.  Description: Treatment Interventions: ADL retraining, Functional transfer training, Endurance training, Patient/family training, Equipment evaluation/education, Compensatory technique education, Continued evaluation, Energy conservation, Activityengagement          See flowsheet documentation for full assessment, interventions and recommendations.   Note: Limitation: Decreased ADL status, Decreased endurance, Decreased self-care trans, Decreased high-level ADLs  Prognosis: Good  Assessment: Pt is a 69 y.o. female seen for OT evaluation s/p adm to [facility] on 2/10/2025 w/ Primary osteoarthritis of right knee . Comorbidities affecting pt’s functional performance include a significant PMH of arthritis, hyperparathyroidism, HTN, polymyalgia, osteopenia. Pt with active OT orders and activity orders for OOB to chair. WBAT RLE. Hemovac. Pt lives with spouse in a multilevel house with 2 NIEVES. Pt has first floor walkin shower w/ grab bars and raised toilets. Bed/bath upstairs. At baseline, pt was independent with all ADLs/IADLs. Pt completed supine to sit with S. Sit to stand from EOB with modAx1 and use of RW. Verbal cues for hand placement and safety with RW. See additional treatment session focusing on ADLs/IADLs, transfers, and LE management. Upon evaluation, pt currently requires S for UB ADLs, Danny for LB ADLs, S for toileting, S for bed mobility, Danny for functional mobility, and Danny for transfers 2* the following deficits impacting occupational performance: weakness, decreased strength , decreased balance, decreased activity tolerance, increased pain, and orthopedic restrictions. These impairments, as well at pt’s personal factors of: NIEVES home environment, steps within home environment, difficulty performing ADLs, difficulty performing  IADLs, WBS, fall risk , and new use of AD for functional transfers/mobility limit pt’s ability to safely engage in all baseline areas of occupation. Based on the aforementioned OT evaluation, functional performance deficits, and assessments, pt has been identified as a high complexity evaluation. Pt to continue to benefit from continued acute OT services during hospital stay to address defined deficits and to maximize level of functional independence in the following Occupational Performance areas: grooming, bathing/shower, toilet hygiene, dressing, health maintenance, functional mobility, community mobility, clothing management, cleaning, household maintenance, and job performance/volunteering. From OT standpoint, recommend No post-acute rehabilitation needs upon D/C. OT will continue to follow pt 3-5x/wk.     Rehab Resource Intensity Level, OT: No post-acute rehabilitation needs

## 2025-02-10 NOTE — ANESTHESIA POSTPROCEDURE EVALUATION
Post-Op Assessment Note    CV Status:  Stable    Pain management: adequate       Mental Status:  Alert and awake   Hydration Status:  Euvolemic   PONV Controlled:  Controlled   Airway Patency:  Patent     Post Op Vitals Reviewed: Yes    No anethesia notable event occurred.    Staff: CRNA           Last Filed PACU Vitals:  Vitals Value Taken Time   Temp 98.3 °F (36.8 °C) 02/10/25 1135   Pulse 58 02/10/25 1135   /58 02/10/25 1135   Resp 19 02/10/25 1135   SpO2 100 % 02/10/25 1135

## 2025-02-11 VITALS
OXYGEN SATURATION: 95 % | WEIGHT: 174 LBS | HEART RATE: 62 BPM | SYSTOLIC BLOOD PRESSURE: 108 MMHG | TEMPERATURE: 98.5 F | DIASTOLIC BLOOD PRESSURE: 48 MMHG | BODY MASS INDEX: 27.97 KG/M2 | HEIGHT: 66 IN | RESPIRATION RATE: 14 BRPM

## 2025-02-11 LAB
ALBUMIN SERPL BCG-MCNC: 3.6 G/DL (ref 3.5–5)
ALP SERPL-CCNC: 51 U/L (ref 34–104)
ALT SERPL W P-5'-P-CCNC: 15 U/L (ref 7–52)
ANION GAP SERPL CALCULATED.3IONS-SCNC: 4 MMOL/L (ref 4–13)
AST SERPL W P-5'-P-CCNC: 15 U/L (ref 13–39)
BILIRUB SERPL-MCNC: 0.46 MG/DL (ref 0.2–1)
BUN SERPL-MCNC: 20 MG/DL (ref 5–25)
CALCIUM SERPL-MCNC: 9.2 MG/DL (ref 8.4–10.2)
CHLORIDE SERPL-SCNC: 105 MMOL/L (ref 96–108)
CO2 SERPL-SCNC: 30 MMOL/L (ref 21–32)
CREAT SERPL-MCNC: 0.63 MG/DL (ref 0.6–1.3)
ERYTHROCYTE [DISTWIDTH] IN BLOOD BY AUTOMATED COUNT: 13.5 % (ref 11.6–15.1)
GFR SERPL CREATININE-BSD FRML MDRD: 92 ML/MIN/1.73SQ M
GLUCOSE P FAST SERPL-MCNC: 106 MG/DL (ref 65–99)
GLUCOSE SERPL-MCNC: 106 MG/DL (ref 65–140)
HCT VFR BLD AUTO: 30.5 % (ref 34.8–46.1)
HGB BLD-MCNC: 10.4 G/DL (ref 11.5–15.4)
MCH RBC QN AUTO: 31 PG (ref 26.8–34.3)
MCHC RBC AUTO-ENTMCNC: 34.1 G/DL (ref 31.4–37.4)
MCV RBC AUTO: 91 FL (ref 82–98)
PLATELET # BLD AUTO: 282 THOUSANDS/UL (ref 149–390)
PMV BLD AUTO: 10.1 FL (ref 8.9–12.7)
POTASSIUM SERPL-SCNC: 4 MMOL/L (ref 3.5–5.3)
PROT SERPL-MCNC: 6.4 G/DL (ref 6.4–8.4)
RBC # BLD AUTO: 3.36 MILLION/UL (ref 3.81–5.12)
SODIUM SERPL-SCNC: 139 MMOL/L (ref 135–147)
WBC # BLD AUTO: 8.15 THOUSAND/UL (ref 4.31–10.16)

## 2025-02-11 PROCEDURE — 97116 GAIT TRAINING THERAPY: CPT | Performed by: PHYSICAL THERAPIST

## 2025-02-11 PROCEDURE — 99024 POSTOP FOLLOW-UP VISIT: CPT | Performed by: PHYSICIAN ASSISTANT

## 2025-02-11 PROCEDURE — 99232 SBSQ HOSP IP/OBS MODERATE 35: CPT | Performed by: INTERNAL MEDICINE

## 2025-02-11 PROCEDURE — 97535 SELF CARE MNGMENT TRAINING: CPT

## 2025-02-11 PROCEDURE — 80053 COMPREHEN METABOLIC PANEL: CPT

## 2025-02-11 PROCEDURE — 85027 COMPLETE CBC AUTOMATED: CPT

## 2025-02-11 RX ADMIN — ACETAMINOPHEN 325MG 975 MG: 325 TABLET ORAL at 10:49

## 2025-02-11 RX ADMIN — OXYCODONE HYDROCHLORIDE 10 MG: 10 TABLET ORAL at 00:24

## 2025-02-11 RX ADMIN — ENOXAPARIN SODIUM 40 MG: 40 INJECTION SUBCUTANEOUS at 09:08

## 2025-02-11 RX ADMIN — DOCUSATE SODIUM 100 MG: 100 CAPSULE, LIQUID FILLED ORAL at 09:09

## 2025-02-11 RX ADMIN — VANCOMYCIN HYDROCHLORIDE 1000 MG: 1 INJECTION, POWDER, LYOPHILIZED, FOR SOLUTION INTRAVENOUS at 09:37

## 2025-02-11 RX ADMIN — METHOCARBAMOL 500 MG: 500 TABLET ORAL at 00:02

## 2025-02-11 RX ADMIN — OXYCODONE HYDROCHLORIDE 5 MG: 5 TABLET ORAL at 10:49

## 2025-02-11 RX ADMIN — METHOCARBAMOL 500 MG: 500 TABLET ORAL at 06:11

## 2025-02-11 RX ADMIN — ACETAMINOPHEN 325MG 975 MG: 325 TABLET ORAL at 00:24

## 2025-02-11 RX ADMIN — GABAPENTIN 100 MG: 100 CAPSULE ORAL at 06:11

## 2025-02-11 NOTE — ASSESSMENT & PLAN NOTE
-PT/OT as ordered.  -Continue with ice and analgesics as needed for pain.    -Continue compression dressing, Drain was discontinued this morning.  -Lovenox for DVT prophylaxis 30 days total treatment.  -Hemoglobin stable 10.4 this morning with no signs of bleeding in the operative extremity.  -Follow-up outpatient with Dr. Murdock as scheduled.  Plan for discharge home later today if cleared from physical therapy standpoint.

## 2025-02-11 NOTE — ASSESSMENT & PLAN NOTE
POD #1 s/p Right TKA, AVSS, HV with 300cc sanguinous secretion, UOP appropriate, pending labs, neurovascularly in tact, pain well controlled, ambulating/transferring well    POD #1 s/p Right TKA -   - Antibiotics: vanco dose completed  - Anticoagulation: Lovenox 40 once daily, SCDs, ambulation  - Activity: WBAT, PT/OT    - AM lab draw: pending labs  - Analgesia: Continue p.r.n. medication  - Dressing: keep clean, dry, and in tact, remove HV today  - Disposition: hopeful for d/c today pending progress with PT/OT today    Dvt proph in place  Pain per primary service  CBC in AM monitor postop hgb

## 2025-02-11 NOTE — PLAN OF CARE
Problem: PAIN - ADULT  Goal: Verbalizes/displays adequate comfort level or baseline comfort level  Description: Interventions:  - Encourage patient to monitor pain and request assistance  - Assess pain using appropriate pain scale  - Administer analgesics based on type and severity of pain and evaluate response  - Implement non-pharmacological measures as appropriate and evaluate response  - Consider cultural and social influences on pain and pain management  - Notify physician/advanced practitioner if interventions unsuccessful or patient reports new pain  Outcome: Progressing     Problem: INFECTION - ADULT  Goal: Absence or prevention of progression during hospitalization  Description: INTERVENTIONS:  - Assess and monitor for signs and symptoms of infection  - Monitor lab/diagnostic results  - Monitor all insertion sites, i.e. indwelling lines, tubes, and drains  - Monitor endotracheal if appropriate and nasal secretions for changes in amount and color  - [place] appropriate cooling/warming therapies per order  - Administer medications as ordered  - Instruct and encourage patient and family to use good hand hygiene technique  - Identify and instruct in appropriate isolation precautions for identified infection/condition  Outcome: Progressing  Goal: Absence of fever/infection during neutropenic period  Description: INTERVENTIONS:  - Monitor WBC    Outcome: Progressing     Problem: SAFETY ADULT  Goal: Patient will remain free of falls  Description: INTERVENTIONS:  - Educate patient/family on patient safety including physical limitations  - Instruct patient to call for assistance with activity   - Consult OT/PT to assist with strengthening/mobility   - Keep Call bell within reach  - Keep bed low and locked with side rails adjusted as appropriate  - Keep care items and personal belongings within reach  - Initiate and maintain comfort rounds  - Make Fall Risk Sign visible to staff  - Offer Toileting every 2 Hours,  in advance of need  - Initiate/Maintain bed/chair alarm  - Obtain necessary fall risk management equipment: rolling walker  - Apply yellow socks and bracelet for high fall risk patients  - Consider moving patient to room near nurses station  Outcome: Progressing  Goal: Maintain or return to baseline ADL function  Description: INTERVENTIONS:  -  Assess patient's ability to carry out ADLs; assess patient's baseline for ADL function and identify physical deficits which impact ability to perform ADLs (bathing, care of mouth/teeth, toileting, grooming, dressing, etc.)  - Assess/evaluate cause of self-care deficits   - Assess range of motion  - Assess patient's mobility; develop plan if impaired  - Assess patient's need for assistive devices and provide as appropriate  - Encourage maximum independence but intervene and supervise when necessary  - Involve family in performance of ADLs  - Assess for home care needs following discharge   - Consider OT consult to assist with ADL evaluation and planning for discharge  - Provide patient education as appropriate  Outcome: Progressing  Goal: Maintains/Returns to pre admission functional level  Description: INTERVENTIONS:  - Perform AM-PAC 6 Click Basic Mobility/ Daily Activity assessment daily.  - Set and communicate daily mobility goal to care team and patient/family/caregiver.   - Collaborate with rehabilitation services on mobility goals if consulted  - Perform Range of Motion 3 times a day.  - Reposition patient every 2 hours.  - Dangle patient 3 times a day  - Stand patient 3 times a day  - Ambulate patient 3 times a day  - Out of bed to chair 3 times a day   - Out of bed for meals 3 times a day  - Out of bed for toileting  - Record patient progress and toleration of activity level   Outcome: Progressing     Problem: DISCHARGE PLANNING  Goal: Discharge to home or other facility with appropriate resources  Description: INTERVENTIONS:  - Identify barriers to discharge  w/patient and caregiver  - Arrange for needed discharge resources and transportation as appropriate  - Identify discharge learning needs (meds, wound care, etc.)  - Arrange for interpretive services to assist at discharge as needed  - Refer to Case Management Department for coordinating discharge planning if the patient needs post-hospital services based on physician/advanced practitioner order or complex needs related to functional status, cognitive ability, or social support system  Outcome: Progressing     Problem: Knowledge Deficit  Goal: Patient/family/caregiver demonstrates understanding of disease process, treatment plan, medications, and discharge instructions  Description: Complete learning assessment and assess knowledge base.  Interventions:  - Provide teaching at level of understanding  - Provide teaching via preferred learning methods  Outcome: Progressing

## 2025-02-11 NOTE — PROGRESS NOTES
Progress Note - Hospitalist   Name: Shiva Shabazz 69 y.o. female I MRN: [id number]  Unit/Bed#: WE 2 N -01 I Date of Admission: 2/10/2025   Date of Service: 2/11/2025 I Hospital Day: 0     Assessment & Plan  Primary osteoarthritis of right knee  POD #1 s/p Right TKA, AVSS, HV with 300cc sanguinous secretion, UOP appropriate, pending labs, neurovascularly in tact, pain well controlled, ambulating/transferring well    POD #1 s/p Right TKA -   - Antibiotics: vanco dose completed  - Anticoagulation: Lovenox 40 once daily, SCDs, ambulation  - Activity: WBAT, PT/OT    - AM lab draw: pending labs  - Analgesia: Continue p.r.n. medication  - Dressing: keep clean, dry, and in tact, remove HV today  - Disposition: hopeful for d/c today pending progress with PT/OT today    Dvt proph in place  Pain per primary service  CBC in AM monitor postop hgb  Essential hypertension  Hold ACEI/ARB/diuretics to decrease risk of post operative JAKE; post op add hydralazine prn for elevated blood pressures. May resume these meds upon discharge  Hydralazine for any accelerated HTN  Acquired hypothyroidism  Continue current thyroid supplement    Rheumatoid arthritis of multiple sites with negative rheumatoid factor (HCC)      Hospitalist service will follow.    Subjective   Pt was seen and examined at chair. Doing very well and denies pain. Reports have soreness and stiffness. In chair currently comfortable. Was able to sleep through the night. Denies any numbness, tingling, lack of motor movement. Denies any other concerns and doesn't have any questions.     Objective :  Temp:  [97.9 °F (36.6 °C)-98.9 °F (37.2 °C)] 97.9 °F (36.6 °C)  HR:  [58-89] 69  BP: (108-154)/(56-95) 138/70  Resp:  [13-36] 18  SpO2:  [93 %-100 %] 96 %  O2 Device: None (Room air)    I/O         02/09 0701  02/10 0700 02/10 0701  02/11 0700    I.V. (mL/kg)  1100 (13.9)    IV Piggyback  250    Total Intake(mL/kg)  1350 (17.1)    Urine (mL/kg/hr)  1300    Drains   300    Total Output  1600    Net  -250                Lines/Drains/Airways       Active Status       Name Placement date Placement time Site Days    Closed/Suction Drain Anterior;Right Knee Accordion 18 Fr. 02/10/25  1057  Knee  less than 1                  Physical Exam  Constitutional:       General: She is not in acute distress.  Cardiovascular:      Rate and Rhythm: Normal rate and regular rhythm.   Pulmonary:      Effort: Pulmonary effort is normal.   Neurological:      Mental Status: She is alert.   Psychiatric:         Behavior: Behavior is cooperative.       Right Lower Extremity: Thigh and calf compartments are soft and nontender to palpation. + L3-S1 SILT. + DF/PF.+ EHL. No pain with passive stretch/extension of toes.  DP 2+, capillary refill less than 2 seconds, and foot is warm B/L. Incision is c/d/i      Lab Results: I have reviewed the following results:  Recent Labs     02/11/25  0605   WBC 8.15   HGB 10.4*   HCT 30.5*      SODIUM 139   K 4.0      CO2 30   BUN 20   CREATININE 0.63   GLUC 106   AST 15   ALT 15   ALB 3.6   TBILI 0.46   ALKPHOS 51       Imaging Results Review: No pertinent imaging studies reviewed.  Other Study Results Review: No additional pertinent studies reviewed.    VTE Pharmacologic Prophylaxis: Enoxaparin (Lovenox)  VTE Mechanical Prophylaxis: sequential compression device

## 2025-02-11 NOTE — PROGRESS NOTES
Progress Note - Orthopedics   Name: Shiva Shabazz 69 y.o. female I MRN: [id number]  Unit/Bed#: WE 2 N -01 I Date of Admission: 2/10/2025   Date of Service: 2/11/2025 I Hospital Day: 0     Assessment & Plan  Primary osteoarthritis of right knee  -PT/OT as ordered.  -Continue with ice and analgesics as needed for pain.    -Continue compression dressing, Drain was discontinued this morning.  -Lovenox for DVT prophylaxis 30 days total treatment.  -Hemoglobin stable 10.4 this morning with no signs of bleeding in the operative extremity.  -Follow-up outpatient with Dr. Murdock as scheduled.  Plan for discharge home later today if cleared from physical therapy standpoint.  Essential hypertension    Rheumatoid arthritis of multiple sites with negative rheumatoid factor (HCC)    Acquired hypothyroidism      Ok for discharge from Orthopedics service perspective.    Subjective   69 y.o.female postop day 1 status post right total knee replacement.  No acute events, no new complaints. Pain well controlled at this time. Denies fevers, chills, CP, SOB, N/V, numbness or tingling. Patient reports no issues with urination or bowel movements. Patient states plan is for discharge home when ready.    Objective :  Temp:  [97.9 °F (36.6 °C)-98.9 °F (37.2 °C)] 98.5 °F (36.9 °C)  HR:  [58-89] 62  BP: (108-154)/(48-95) 108/48  Resp:  [13-36] 14  SpO2:  [93 %-100 %] 95 %  O2 Device: None (Room air)    Physical Exam  Musculoskeletal: rightlower  No erythema or ecchymosis.  Dressing clean, dry and intact at this time.  Drain was discontinued this morning.  TTP melvin-incisional area  Motor intact to +FHL/EHL, +ankle dorsi/plantar flexion  Sensation intact to saphenous, sural, tibial, superficial peroneal nerve, and deep peroneal  2+ DP pulse  No calf swelling or tenderness to palpation      Lab Results: I have reviewed the following results:  Recent Labs     02/11/25  0605   WBC 8.15   HGB 10.4*   HCT 30.5*      BUN 20  "  CREATININE 0.63     Blood Culture:  No results found for: \"BLOODCX\"  Wound Culture: No results found for: \"WOUNDCULT\"  "

## 2025-02-11 NOTE — PLAN OF CARE
"  Problem: PHYSICAL THERAPY ADULT  Goal: Performs mobility at highest level of function for planned discharge setting.  See evaluation for individualized goals.  Description: Treatment/Interventions: LE strengthening/ROM, Functional transfer training, Elevations, Therapeutic exercise, Endurance training, Bed mobility, Gait training, Spoke to nursing, OT, Family  Equipment Recommended: Walker (pt owns)       See flowsheet documentation for full assessment, interventions and recommendations.  Note: Prognosis: Good  Problem List: Decreased strength, Impaired balance, Decreased endurance, Decreased range of motion, Decreased mobility, Pain  Assessment: Patient was seen today per POC. Overall, pt demonstrated improved mobility and inc tolerance to activity. Pt was greeted in recliner chair at start of session. Pt able to perform sit<>stand with RW and S. Patient was able to amb 170'x2 with RW and S. Pt then performed 10 steps with BL rails and S. Gait deviations as mentioned above, no reports of dizziness t/o session. Nsg staff most recent vital signs as follows: BP (!) 108/48   Pulse 62   Temp 98.5 °F (36.9 °C)   Resp 14   Ht 5' 6\" (1.676 m)   Wt 78.9 kg (174 lb)   LMP  (LMP Unknown)   SpO2 95%   BMI 28.08 kg/m² . Will continue to see pt per POC as tolerated.  From PT standpoint continued recommendation for home at D/C when medically cleared based on nursing. Based on pt presentation and impaired function, pt would benefit from level III, (minimal resource intensity) at D/C.  Nsg staff to continue to mobilized pt (OOB in chair for all meals & ambulate in room/unit) as tolerated to prevent further decline in function. Nsg staff notified.  Barriers to Discharge: Inaccessible home environment  Barriers to Discharge Comments: NIEVES  Rehab Resource Intensity Level, PT: III (Minimum Resource Intensity)    See flowsheet documentation for full assessment.        "

## 2025-02-11 NOTE — PHYSICAL THERAPY NOTE
PT PROGRESS NOTE    Name: Shiva Shabazz  AGE: 69 y.o.  MRN: [id number]  LENGTH OF STAY: 0        02/11/25 0925   PT Last Visit   PT Visit Date 02/11/25   Note Type   Note Type Treatment   Pain Assessment   Pain Assessment Tool 0-10   Pain Score 4   Pain Location/Orientation Orientation: Right;Location: Knee   Hospital Pain Intervention(s) Repositioned;Ambulation/increased activity;Elevated;Emotional support;Rest   Restrictions/Precautions   Weight Bearing Precautions Per Order Yes   RLE Weight Bearing Per Order WBAT   Other Precautions Chair Alarm;Bed Alarm;WBS;Fall Risk;Pain   General   Chart Reviewed Yes   Response to Previous Treatment Patient with no complaints from previous session.   Family/Caregiver Present No   Cognition   Overall Cognitive Status WFL   Arousal/Participation Alert   Attention Within functional limits   Orientation Level Oriented X4   Following Commands Follows all commands and directions without difficulty   Comments pt motivated   Bed Mobility   Supine to Sit Unable to assess   Sit to Supine Unable to assess   Additional Comments pt greeted in recliner chair   Transfers   Sit to Stand 5  Supervision   Additional items Increased time required;Verbal cues  (RW)   Stand to Sit 5  Supervision   Additional items Increased time required;Verbal cues  (RW)   Additional Comments cues for hand placement   Ambulation/Elevation   Gait pattern Improper Weight shift;Antalgic;Short stride;Decreased toe off;Decreased heel strike   Gait Assistance 5  Supervision   Additional items Verbal cues   Assistive Device Rolling walker   Distance 170'x2   Stair Management Assistance 5  Supervision   Additional items Increased time required;Verbal cues   Stair Management Technique Two rails;Step to pattern;Foreward   Number of Stairs 10   Ambulation/Elevation Additional Comments cues for LE sequencing during stair training   Balance   Static Sitting Good   Dynamic Sitting Fair +   Static Standing Fair  "  Dynamic Standing Fair -   Ambulatory Fair -   Endurance Deficit   Endurance Deficit No   Activity Tolerance   Activity Tolerance Patient tolerated treatment well   Medical Staff Made Aware RN C   Nurse Made Aware yes   Assessment   Prognosis Good   Problem List Decreased strength;Impaired balance;Decreased endurance;Decreased range of motion;Decreased mobility;Pain   Assessment Patient was seen today per POC. Overall, pt demonstrated improved mobility and inc tolerance to activity. Pt was greeted in recliner chair at start of session. Pt able to perform sit<>stand with RW and S. Patient was able to amb 170'x2 with RW and S. Pt then performed 10 steps with BL rails and S. Gait deviations as mentioned above, no reports of dizziness t/o session. Ns staff most recent vital signs as follows: BP (!) 108/48   Pulse 62   Temp 98.5 °F (36.9 °C)   Resp 14   Ht 5' 6\" (1.676 m)   Wt 78.9 kg (174 lb)   LMP  (LMP Unknown)   SpO2 95%   BMI 28.08 kg/m² . Will continue to see pt per POC as tolerated.  From PT standpoint continued recommendation for home at D/C when medically cleared based on nursing. Based on pt presentation and impaired function, pt would benefit from level III, (minimal resource intensity) at D/C.  Ns staff to continue to mobilized pt (OOB in chair for all meals & ambulate in room/unit) as tolerated to prevent further decline in function. [facility] staff notified.   Barriers to Discharge Inaccessible home environment   Barriers to Discharge Comments NIEVES   Goals   Patient Goals to go home   STG Expiration Date 02/17/25   Short Term Goal #1 1) Inc overall LE strength by 1/2 MMT grade to improve functional mobility; 2) Pt will demonstrate improved bed mobility with mod I to dec caregiver burden; 3) Pt will demonstrate improved transfers w/ mod I for inc safety; 4) Pt will be able to amb w/ mod I >150' w/ RW for household distances to inc safety and dec caregiver burden; 5) Pt will be able to navigate stairs " with mod I to dec caregiver burden and inc safety with functional mobility; 6) Improve general balance by 1 grade to inc safety; 7) PT for ongoing patient and caregiver education   PT Treatment Day 2   Plan   Treatment/Interventions LE strengthening/ROM;Functional transfer training;Elevations;Therapeutic exercise;Endurance training;Bed mobility;Gait training;Spoke to nursing;OT;Family   Progress Progressing toward goals   PT Frequency Twice a day  (prn)   Discharge Recommendation   Rehab Resource Intensity Level, PT III (Minimum Resource Intensity)   Equipment Recommended Walker  (pt owns)   Additional Comments The patient's AM-PAC Basic Mobility Inpatient Short Form Raw Score is 21. A Raw score of greater than 16 suggests the patient may benefit from discharge to home. Please also refer to the recommendation of the Physical Therapist for safe discharge planning.   AM-PAC Basic Mobility Inpatient   Turning in Flat Bed Without Bedrails 4   Lying on Back to Sitting on Edge of Flat Bed Without Bedrails 4   Moving Bed to Chair 4   Standing Up From Chair Using Arms 3   Walk in Room 3   Climb 3-5 Stairs With Railing 3   Basic Mobility Inpatient Raw Score 21   Basic Mobility Standardized Score 45.55   [facility] Highest Level Of Mobility   JH-HLM Goal 6: Walk 10 steps or more   -HLM Achieved 7: Walk 25 feet or more   Education   Education Provided Mobility training;Home exercise program;Assistive device   Patient Demonstrates acceptance/verbal understanding   End of Consult   Patient Position at End of Consult Bedside chair;Bed/Chair alarm activated;All needs within reach   End of Consult Comments pt stable, left in recliner chair with alarm on and call canales. RN updated     Morenita Grayson, PT

## 2025-02-11 NOTE — OCCUPATIONAL THERAPY NOTE
Occupational Therapy Progress Note     Patient Name: Shiva Shabazz  Today's Date: 2/11/2025  Problem List  Principal Problem:    Primary osteoarthritis of right knee  Active Problems:    Essential hypertension    Rheumatoid arthritis of multiple sites with negative rheumatoid factor (HCC)    Acquired hypothyroidism       02/11/25 0908   OT Last Visit   OT Visit Date 02/11/25   Note Type   Note Type Treatment   Pain Assessment   Pain Assessment Tool 0-10   Pain Score 4   Pain Location/Orientation Orientation: Right;Location: Knee   Hospital Pain Intervention(s) Repositioned;Ambulation/increased activity;Emotional support;Rest   Restrictions/Precautions   Weight Bearing Precautions Per Order Yes   RLE Weight Bearing Per Order WBAT   Other Precautions Chair Alarm;WBS;Fall Risk;Pain   Lifestyle   Autonomy Independent with all ADLs/IADLs, use of cane in community   Reciprocal Relationships Family   Service to Others Retired   Intrinsic Gratification walking   ADL   Where Assessed Chair   UB Dressing Assistance 5  Supervision/Setup   UB Dressing Deficit Setup;Verbal cueing   LB Dressing Assistance 5  Supervision/Setup   LB Dressing Deficit Setup;Verbal cueing;Supervision/safety;Requires assistive device for steadying   Functional Standing Tolerance   Time ~1 min   Activity standing for dressing tasks   Comments use of RW for stability   Bed Mobility   Supine to Sit 1  Dependent   Sit to Supine 1  Dependent   Additional Comments Pt greeted OOB in recliner   Transfers   Sit to Stand 5  Supervision   Additional items Increased time required;Verbal cues;Armrests  (RW)   Stand to Sit 5  Supervision   Additional items Armrests;Increased time required;Verbal cues  (RW)   Additional Comments verbal cues for hand placement and safety with use of RW   Functional Mobility   Functional Mobility 5  Supervision   Additional Comments Pt completed functional mobility functional household distance with use of RW andS   Additional  items Rolling walker   Cognition   Overall Cognitive Status WFL   Arousal/Participation Alert;Cooperative   Attention Within functional limits   Orientation Level Oriented X4   Memory Within functional limits   Following Commands Follows all commands and directions without difficulty   Comments Cooperative and motivated   Activity Tolerance   Activity Tolerance Patient tolerated treatment well   Medical Staff Made Aware PT СЕРГЕЙ Scales Pt seen for co-evaluation/treatment with skilled Physical Therapy due to pt's medical complexity, decreased endurance, overall functional level, overall safety, and post surgical day #1.   Assessment   Assessment Pt seen for OT treatment session focusing on ADLs/IADLs, functional mobility, functional standing tolerance, functional transfers, patient education, continued evaluation. Pt greeted OOB in recliner at start of session. Pt alert and cooperative throughout session. Pt with good sitting balance and fair - dynamic standing balance. Pt tolerated treatment well. Pt completed don of underwear and pants with S seated. Then standing to pull over waist with use of RW for stability. Pt completed don of shirt with S seated. Pt then completed functional mobility functional household distance with use of RW and S. Pt educated on ADLs/IADLs, functional transfers, and LE management for independence and safety at home. Pt reports 4/10 pain and no dizziness. Pt's vitals include: stable.     Pt ended session seated OOB in recliner. Call bell and phone within reach. All needs met and pt reports no further questions at this time. Continue to recommend No post-acute rehabilitation needs when medically cleared. OT will continue to follow pt on caseload.   Plan   Treatment Interventions ADL retraining;Functional transfer training;Endurance training;Patient/family training;Equipment evaluation/education;Compensatory technique education;Continued evaluation;Energy conservation;Activityengagement   Goal  Expiration Date 02/17/25   OT Treatment Day 1   OT Frequency 3-5x/wk   Discharge Recommendation   Rehab Resource Intensity Level, OT No post-acute rehabilitation needs   Additional Comments  The patient's raw score on the AM-PAC Daily Activity Inpatient Short Form is 21. A raw score of greater than or equal to 19 suggests the patient may benefit from discharge to home. Please refer to the recommendation of the Occupational Therapist for safe discharge planning.   AM-PAC Daily Activity Inpatient   Lower Body Dressing 3   Bathing 3   Toileting 3   Upper Body Dressing 4   Grooming 4   Eating 4   Daily Activity Raw Score 21   Daily Activity Standardized Score (Calc for Raw Score >=11) 44.27   AM-PAC Applied Cognition Inpatient   Following a Speech/Presentation 4   Understanding Ordinary Conversation 4   Taking Medications 4   Remembering Where Things Are Placed or Put Away 4   Remembering List of 4-5 Errands 4   Taking Care of Complicated Tasks 4   Applied Cognition Raw Score 24   Applied Cognition Standardized Score 62.21   End of Consult   Education Provided Yes   Patient Position at End of Consult Bedside chair;All needs within reach   Nurse Communication Nurse aware of consult   Diane Roman, OT

## 2025-02-11 NOTE — PLAN OF CARE
Problem: OCCUPATIONAL THERAPY ADULT  Goal: Performs self-care activities at highest level of function for planned discharge setting.  See evaluation for individualized goals.  Description: Treatment Interventions: ADL retraining, Functional transfer training, Endurance training, Patient/family training, Equipment evaluation/education, Compensatory technique education, Continued evaluation, Energy conservation, Activityengagement          See flowsheet documentation for full assessment, interventions and recommendations.   2/11/2025 1453 by Diane Roman OT  Outcome: Adequate for Discharge  Note: Limitation: Decreased ADL status, Decreased endurance, Decreased self-care trans, Decreased high-level ADLs  Prognosis: Good  Assessment: Pt seen for OT treatment session focusing on ADLs/IADLs, functional mobility, functional standing tolerance, functional transfers, patient education, continued evaluation. Pt greeted OOB in recliner at start of session. Pt alert and cooperative throughout session. Pt with good sitting balance and fair - dynamic standing balance. Pt tolerated treatment well. Pt completed don of underwear and pants with S seated. Then standing to pull over waist with use of RW for stability. Pt completed don of shirt with S seated. Pt then completed functional mobility functional household distance with use of RW and S. Pt educated on ADLs/IADLs, functional transfers, and LE management for independence and safety at home. Pt reports 4/10 pain and no dizziness. Pt's vitals include: stable.     Pt ended session seated OOB in recliner. Call bell and phone within reach. All needs met and pt reports no further questions at this time. Continue to recommend No post-acute rehabilitation needs when medically cleared. OT will continue to follow pt on caseload.     Rehab Resource Intensity Level, OT: No post-acute rehabilitation needs       2/11/2025 1453 by Diane Roman OT  Reactivated

## 2025-02-11 NOTE — PLAN OF CARE
Problem: PAIN - ADULT  Goal: Verbalizes/displays adequate comfort level or baseline comfort level  Description: Interventions:  - Encourage patient to monitor pain and request assistance  - Assess pain using appropriate pain scale  - Administer analgesics based on type and severity of pain and evaluate response  - Implement non-pharmacological measures as appropriate and evaluate response  - Consider cultural and social influences on pain and pain management  - Notify physician/advanced practitioner if interventions unsuccessful or patient reports new pain  Outcome: Progressing     Problem: INFECTION - ADULT  Goal: Absence or prevention of progression during hospitalization  Description: INTERVENTIONS:  - Assess and monitor for signs and symptoms of infection  - Monitor lab/diagnostic results  - Monitor all insertion sites, i.e. indwelling lines, tubes, and drains  - Monitor endotracheal if appropriate and nasal secretions for changes in amount and color  - [place] appropriate cooling/warming therapies per order  - Administer medications as ordered  - Instruct and encourage patient and family to use good hand hygiene technique  - Identify and instruct in appropriate isolation precautions for identified infection/condition  Outcome: Progressing  Goal: Absence of fever/infection during neutropenic period  Description: INTERVENTIONS:  - Monitor WBC    Outcome: Progressing     Problem: SAFETY ADULT  Goal: Patient will remain free of falls  Description: INTERVENTIONS:  - Educate patient/family on patient safety including physical limitations  - Instruct patient to call for assistance with activity   - Consult OT/PT to assist with strengthening/mobility   - Keep Call bell within reach  - Keep bed low and locked with side rails adjusted as appropriate  - Keep care items and personal belongings within reach  - Initiate and maintain comfort rounds  - Make Fall Risk Sign visible to staff  - Offer Toileting every 2 Hours,  in advance of need  - Initiate/Maintain bed/chair alarm  - Obtain necessary fall risk management equipment: rolling walker  - Apply yellow socks and bracelet for high fall risk patients  - Consider moving patient to room near nurses station  Outcome: Progressing  Goal: Maintain or return to baseline ADL function  Description: INTERVENTIONS:  -  Assess patient's ability to carry out ADLs; assess patient's baseline for ADL function and identify physical deficits which impact ability to perform ADLs (bathing, care of mouth/teeth, toileting, grooming, dressing, etc.)  - Assess/evaluate cause of self-care deficits   - Assess range of motion  - Assess patient's mobility; develop plan if impaired  - Assess patient's need for assistive devices and provide as appropriate  - Encourage maximum independence but intervene and supervise when necessary  - Involve family in performance of ADLs  - Assess for home care needs following discharge   - Consider OT consult to assist with ADL evaluation and planning for discharge  - Provide patient education as appropriate  Outcome: Progressing  Goal: Maintains/Returns to pre admission functional level  Description: INTERVENTIONS:  - Perform AM-PAC 6 Click Basic Mobility/ Daily Activity assessment daily.  - Set and communicate daily mobility goal to care team and patient/family/caregiver.   - Collaborate with rehabilitation services on mobility goals if consulted  - Perform Range of Motion 3 times a day.  - Reposition patient every 2 hours.  - Dangle patient 3 times a day  - Stand patient 3 times a day  - Ambulate patient 3 times a day  - Out of bed to chair 3 times a day   - Out of bed for meals 3 times a day  - Out of bed for toileting  - Record patient progress and toleration of activity level   Outcome: Progressing     Problem: DISCHARGE PLANNING  Goal: Discharge to home or other facility with appropriate resources  Description: INTERVENTIONS:  - Identify barriers to discharge  w/patient and caregiver  - Arrange for needed discharge resources and transportation as appropriate  - Identify discharge learning needs (meds, wound care, etc.)  - Arrange for interpretive services to assist at discharge as needed  - Refer to Case Management Department for coordinating discharge planning if the patient needs post-hospital services based on physician/advanced practitioner order or complex needs related to functional status, cognitive ability, or social support system  Outcome: Progressing     Problem: Knowledge Deficit  Goal: Patient/family/caregiver demonstrates understanding of disease process, treatment plan, medications, and discharge instructions  Description: Complete learning assessment and assess knowledge base.  Interventions:  - Provide teaching at level of understanding  - Provide teaching via preferred learning methods  Outcome: Progressing

## 2025-02-12 ENCOUNTER — TELEPHONE (OUTPATIENT)
Dept: OBGYN CLINIC | Facility: HOSPITAL | Age: 70
End: 2025-02-12

## 2025-02-12 NOTE — TELEPHONE ENCOUNTER
"Patient contacted for a postoperative follow up assessment. Patient states current pain level of a  \"doing OK, slept good last night\"  and is walking with RW.  Patient reports increase in swelling and dressing is Dressing C/D/I. Patient is not icing the site regularly. Discussed and educated on the importance of icing regularly, 20 min on/off, rotating sites for swelling control and soreness. NN educated patient on post-op swelling, icing, and constipation.    Confirmed upcoming appointments w/ patient:   PT 2/13  Postop 2/21     We reviewed patients AVS medication list. Patient is taking Oxycodone 5mg PRN, Lovenox injections daily, and tizanidine 2mg PRN. Patient states the pharmacy did not give her Tylenol but she found Tylenol 500mg in cabinet. Educated on Tylenol max dosing, 3000mg/24 hours and discussed starting Tylenol 1000mg every 8 hours. Pt agreeable. Patient has not had a BM but is not taking an OTC stool softener. Discussed postop constipation, increasing fluids/fiber, prunes or prune juice, and importance of starting OTC stool softener. Pt verbalizes understanding.      Patient denies nausea, vomiting, abdominal pain, chest pain, shortness of breath, fever, dizziness, and calf pain. Patient does not have any other questions or concerns at this time. Pt was encouraged to call with any questions, concerns or issues.     "

## 2025-02-13 ENCOUNTER — OFFICE VISIT (OUTPATIENT)
Dept: PHYSICAL THERAPY | Facility: CLINIC | Age: 70
End: 2025-02-13
Payer: MEDICARE

## 2025-02-13 DIAGNOSIS — M17.11 PRIMARY OSTEOARTHRITIS OF RIGHT KNEE: Primary | ICD-10-CM

## 2025-02-13 DIAGNOSIS — Z96.651 STATUS POST TOTAL RIGHT KNEE REPLACEMENT: ICD-10-CM

## 2025-02-13 PROCEDURE — 97164 PT RE-EVAL EST PLAN CARE: CPT | Performed by: PHYSICAL THERAPIST

## 2025-02-13 PROCEDURE — 97110 THERAPEUTIC EXERCISES: CPT | Performed by: PHYSICAL THERAPIST
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
## 2025-02-16 ENCOUNTER — APPOINTMENT (EMERGENCY)
Dept: CT IMAGING | Facility: HOSPITAL | Age: 70
End: 2025-02-16
Payer: MEDICARE

## 2025-02-16 ENCOUNTER — HOSPITAL ENCOUNTER (EMERGENCY)
Facility: HOSPITAL | Age: 70
Discharge: HOME | End: 2025-02-16
Attending: EMERGENCY MEDICINE | Admitting: EMERGENCY MEDICINE
Payer: MEDICARE

## 2025-02-16 VITALS
DIASTOLIC BLOOD PRESSURE: 84 MMHG | TEMPERATURE: 98.1 F | BODY MASS INDEX: 27.97 KG/M2 | HEART RATE: 85 BPM | WEIGHT: 174 LBS | SYSTOLIC BLOOD PRESSURE: 193 MMHG | RESPIRATION RATE: 18 BRPM | HEIGHT: 66 IN | OXYGEN SATURATION: 99 %

## 2025-02-16 DIAGNOSIS — R60.0 LOCALIZED EDEMA: ICD-10-CM

## 2025-02-16 DIAGNOSIS — M79.89 RIGHT LEG SWELLING: Primary | ICD-10-CM

## 2025-02-16 LAB
ANION GAP SERPL CALCULATED.3IONS-SCNC: 7 MMOL/L (ref 4–13)
BASOPHILS # BLD AUTO: 0.05 THOUSANDS/ΜL (ref 0–0.1)
BASOPHILS NFR BLD AUTO: 1 % (ref 0–1)
BUN SERPL-MCNC: 19 MG/DL (ref 5–25)
CALCIUM SERPL-MCNC: 9 MG/DL (ref 8.4–10.2)
CHLORIDE SERPL-SCNC: 106 MMOL/L (ref 96–108)
CO2 SERPL-SCNC: 26 MMOL/L (ref 21–32)
CREAT SERPL-MCNC: 0.85 MG/DL (ref 0.6–1.3)
EOSINOPHIL # BLD AUTO: 0.2 THOUSAND/ΜL (ref 0–0.61)
EOSINOPHIL NFR BLD AUTO: 3 % (ref 0–6)
ERYTHROCYTE [DISTWIDTH] IN BLOOD BY AUTOMATED COUNT: 13.6 % (ref 11.6–15.1)
GFR SERPL CREATININE-BSD FRML MDRD: 70 ML/MIN/1.73SQ M
GLUCOSE SERPL-MCNC: 93 MG/DL (ref 65–140)
HCT VFR BLD AUTO: 30.8 % (ref 34.8–46.1)
HGB BLD-MCNC: 9.9 G/DL (ref 11.5–15.4)
IMM GRANULOCYTES # BLD AUTO: 0.04 THOUSAND/UL (ref 0–0.2)
IMM GRANULOCYTES NFR BLD AUTO: 1 % (ref 0–2)
LACTATE SERPL-SCNC: 0.5 MMOL/L (ref 0.5–2)
LYMPHOCYTES # BLD AUTO: 1.3 THOUSANDS/ΜL (ref 0.6–4.47)
LYMPHOCYTES NFR BLD AUTO: 21 % (ref 14–44)
MCH RBC QN AUTO: 29.6 PG (ref 26.8–34.3)
MCHC RBC AUTO-ENTMCNC: 32.1 G/DL (ref 31.4–37.4)
MCV RBC AUTO: 92 FL (ref 82–98)
MONOCYTES # BLD AUTO: 0.55 THOUSAND/ΜL (ref 0.17–1.22)
MONOCYTES NFR BLD AUTO: 9 % (ref 4–12)
NEUTROPHILS # BLD AUTO: 4.15 THOUSANDS/ΜL (ref 1.85–7.62)
NEUTS SEG NFR BLD AUTO: 65 % (ref 43–75)
NRBC BLD AUTO-RTO: 0 /100 WBCS
PLATELET # BLD AUTO: 360 THOUSANDS/UL (ref 149–390)
PMV BLD AUTO: 9.9 FL (ref 8.9–12.7)
POTASSIUM SERPL-SCNC: 3.9 MMOL/L (ref 3.5–5.3)
PROCALCITONIN SERPL-MCNC: 0.06 NG/ML
RBC # BLD AUTO: 3.35 MILLION/UL (ref 3.81–5.12)
SODIUM SERPL-SCNC: 139 MMOL/L (ref 135–147)
WBC # BLD AUTO: 6.29 THOUSAND/UL (ref 4.31–10.16)

## 2025-02-16 PROCEDURE — 80048 BASIC METABOLIC PNL TOTAL CA: CPT

## 2025-02-16 PROCEDURE — 99285 EMERGENCY DEPT VISIT HI MDM: CPT | Performed by: EMERGENCY MEDICINE

## 2025-02-16 PROCEDURE — 84145 PROCALCITONIN (PCT): CPT

## 2025-02-16 PROCEDURE — 85025 COMPLETE CBC W/AUTO DIFF WBC: CPT

## 2025-02-16 PROCEDURE — 83605 ASSAY OF LACTIC ACID: CPT

## 2025-02-16 PROCEDURE — 73701 CT LOWER EXTREMITY W/DYE: CPT

## 2025-02-16 PROCEDURE — 96374 THER/PROPH/DIAG INJ IV PUSH: CPT

## 2025-02-16 PROCEDURE — 93005 ELECTROCARDIOGRAM TRACING: CPT

## 2025-02-16 PROCEDURE — 36415 COLL VENOUS BLD VENIPUNCTURE: CPT

## 2025-02-16 PROCEDURE — 99284 EMERGENCY DEPT VISIT MOD MDM: CPT

## 2025-02-16 RX ORDER — HYDROMORPHONE HCL/PF 1 MG/ML
0.5 SYRINGE (ML) INJECTION ONCE
Status: COMPLETED | OUTPATIENT
Start: 2025-02-16 | End: 2025-02-16

## 2025-02-16 RX ADMIN — IOHEXOL 100 ML: 350 INJECTION, SOLUTION INTRAVENOUS at 19:23

## 2025-02-16 RX ADMIN — HYDROMORPHONE HYDROCHLORIDE 0.5 MG: 1 INJECTION, SOLUTION INTRAMUSCULAR; INTRAVENOUS; SUBCUTANEOUS at 18:46
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
## 2025-02-17 ENCOUNTER — HOSPITAL ENCOUNTER (OUTPATIENT)
Dept: VASCULAR ULTRASOUND | Facility: HOSPITAL | Age: 70
Discharge: HOME | End: 2025-02-17
Payer: MEDICARE

## 2025-02-17 ENCOUNTER — TELEPHONE (OUTPATIENT)
Age: 70
End: 2025-02-17

## 2025-02-17 DIAGNOSIS — R60.0 LOCALIZED EDEMA: ICD-10-CM

## 2025-02-17 LAB
ATRIAL RATE: 84 BPM
P AXIS: 70 DEGREES
PR INTERVAL: 168 MS
QRS AXIS: 77 DEGREES
QRSD INTERVAL: 84 MS
QT INTERVAL: 352 MS
QTC INTERVAL: 415 MS
T WAVE AXIS: 55 DEGREES
VENTRICULAR RATE: 84 BPM

## 2025-02-17 PROCEDURE — 93010 ELECTROCARDIOGRAM REPORT: CPT | Performed by: INTERNAL MEDICINE

## 2025-02-17 PROCEDURE — 93971 EXTREMITY STUDY: CPT

## 2025-02-17 PROCEDURE — 93971 EXTREMITY STUDY: CPT | Performed by: INTERNAL MEDICINE
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
## 2025-02-17 NOTE — TELEPHONE ENCOUNTER
Caller: patient    Doctor: Dr. boucher    Reason for call: Patients R leg is swollen and in pain, patient went to the ED on 2/16 because of this pain. Patient received a VAS LOWER LIMB VENOUS DUPLEX STUDY today 2/17 on her leg. Patient would like to inform dr and clinical team of testing that was done. Patient would like to be seen before 2/21 appointment but did not see anything available   Please advise     Call back#: [phone]

## 2025-02-17 NOTE — DISCHARGE INSTRUCTIONS
You have been evaluated in the Emergency Department today for right leg swelling, warmth, and tingling. Your evaluation, including CT scan and blood work, suggests that your symptoms are due to normal postop changes.  Please follow up with your surgeon and primary care physician within two days.  Return to the Emergency Department if you experience fever, chills, chest pain, shortness of breath, worsening or severe headache, visual disturbances, dizziness, severe leg pain, numbness, weakness, persistent tingling, or any other concerning symptoms.

## 2025-02-18 ENCOUNTER — OFFICE VISIT (OUTPATIENT)
Dept: PHYSICAL THERAPY | Facility: CLINIC | Age: 70
End: 2025-02-18
Payer: MEDICARE

## 2025-02-18 DIAGNOSIS — Z96.651 STATUS POST TOTAL RIGHT KNEE REPLACEMENT: ICD-10-CM

## 2025-02-18 DIAGNOSIS — M17.11 PRIMARY OSTEOARTHRITIS OF RIGHT KNEE: Primary | ICD-10-CM

## 2025-02-18 PROCEDURE — 97110 THERAPEUTIC EXERCISES: CPT

## 2025-02-18 PROCEDURE — 97112 NEUROMUSCULAR REEDUCATION: CPT

## 2025-02-18 NOTE — PROGRESS NOTES
"Daily Note     Today's date: 2025  Patient name: Shiva Shabazz  : 1955  MRN: [id number]  Referring provider: Deep Murdock,*  Dx:   Encounter Diagnosis     ICD-10-CM    1. Primary osteoarthritis of right knee  M17.11       2. Status post total right knee replacement  Z96.651                      Subjective: Pt reports she went to the ED room due to increased swelling.  Reports she was negative for DVT and blood work came back fine.  She reports her ace wrap was too tight.  Now, she states she is feeling much better and her pain is well controlled.       Objective: See treatment diary below      Assessment: Able to activate quads without VC/TC.  Achieves 0* resting knee extension with charted interventions.  AROM R knee flexion is 93* and 96* passively at this time.  Performs charted interventions without complications.  She is educated in icing with RLE elevation and demonstrates verbal understanding.      Plan: Continue per plan of care.      Precautions: polymyalgia rheumatica,         POC expires Unit limit Auth Expiration date PT/OT/ST + Visit Limit?   25 BOMN 25 BOMN                           Visit/Unit Tracking  AUTH Status:  Date             Requires 10 visit PN Used 1 2 3       10 V PN      Remaining                    [email or web address]  Access Code: [email or web address]      Manuals                                                               Neuro Re-Ed             Quad set HEP 3\"X20 3\"x20          Glute set  HEP             SAQ iso   3\" 2x10                                                              Ther Ex             Pt Edu KS  KS AF          PROM   AF          Ankle pumps  HEP            Heel slides  HEP x20 x20          LAQ HEP  x20          Dock kick   3 min           Gastroc stretch w srap   4x20\"  30\"x4          Seated knee flexion stretch   HEP           Seated knee extension stretch   HEP                      "   Ther Activity                                       Gait Training             Walker height adjustment   KS           TUG  33.43 sec w walker            Modalities

## 2025-02-20 ENCOUNTER — OFFICE VISIT (OUTPATIENT)
Dept: PHYSICAL THERAPY | Facility: CLINIC | Age: 70
End: 2025-02-20
Payer: MEDICARE

## 2025-02-20 DIAGNOSIS — Z96.651 STATUS POST TOTAL RIGHT KNEE REPLACEMENT: ICD-10-CM

## 2025-02-20 DIAGNOSIS — M17.11 PRIMARY OSTEOARTHRITIS OF RIGHT KNEE: Primary | ICD-10-CM

## 2025-02-20 PROCEDURE — 97110 THERAPEUTIC EXERCISES: CPT

## 2025-02-20 PROCEDURE — 97112 NEUROMUSCULAR REEDUCATION: CPT

## 2025-02-20 PROCEDURE — 97140 MANUAL THERAPY 1/> REGIONS: CPT

## 2025-02-20 NOTE — PROGRESS NOTES
"Daily Note     Today's date: 2025  Patient name: Shiva Shabazz  : 1955  MRN: [id number]  Referring provider: Deep Murdock,*  Dx:   Encounter Diagnosis     ICD-10-CM    1. Primary osteoarthritis of right knee  M17.11       2. Status post total right knee replacement  Z96.651           Start Time: 1115  Stop Time: 1157  Total time in clinic (min): 42 minutes    Subjective: Presents to therapy using rolling walker, noting her swelling has improved greatly since last session. States she is continuing to ice regularly and is able to sleep for at least six hours at a time during the night.       Objective: See treatment diary below      Assessment: Patient tolerated today's session well, continuing to focus on quad activation and right knee PROM. Initial muscle guarding present during manual PROM knee flexion, improving with repetitions. Reviewed heel toe sequencing during ambulation using rolling walker for improved gait mechanics. Patient demonstrated fatigue post treatment, exhibited good technique with therapeutic exercises, and would benefit from continued PT      Plan: Continue per plan of care.      Precautions: polymyalgia rheumatica,         POC expires Unit limit Auth Expiration date PT/OT/ST + Visit Limit?   25 BOMN 25 BOMN                           Visit/Unit Tracking  AUTH Status:  Date            Requires 10 visit PN Used 1 2 3 4      10 V PN      Remaining                    [email or web address]:[email or web address]  Access Code: [email or web address]      Manuals                                                              Neuro Re-Ed             Quad set HEP 3\"X20 3\"x20 5''x20         Glute set  HEP             SAQ iso   3\" 2x10 5''x20                                                             Ther Ex             Pt Edu KS  KS AF          PROM   AF JS         Ankle pumps  HEP            Heel slides  HEP x20 x20 5''x20         LAQ HEP  " "x20 x20         Dock kick   3 min  3'         Gastroc stretch w srap   4x20\"  30\"x4 4x30''         Seated knee flexion stretch   HEP           Seated knee extension stretch   HEP                        Ther Activity                                       Gait Training             Walker height adjustment   KS           TUG  33.43 sec w walker            Modalities                                              "

## 2025-02-21 ENCOUNTER — OFFICE VISIT (OUTPATIENT)
Dept: OBGYN CLINIC | Facility: MEDICAL CENTER | Age: 70
End: 2025-02-21

## 2025-02-21 ENCOUNTER — APPOINTMENT (OUTPATIENT)
Dept: RADIOLOGY | Facility: MEDICAL CENTER | Age: 70
End: 2025-02-21
Payer: MEDICARE

## 2025-02-21 VITALS — HEIGHT: 66 IN | BODY MASS INDEX: 27.97 KG/M2 | WEIGHT: 174 LBS

## 2025-02-21 DIAGNOSIS — Z96.651 STATUS POST TOTAL RIGHT KNEE REPLACEMENT: Primary | ICD-10-CM

## 2025-02-21 DIAGNOSIS — M17.11 PRIMARY OSTEOARTHRITIS OF RIGHT KNEE: ICD-10-CM

## 2025-02-21 DIAGNOSIS — Z96.651 STATUS POST TOTAL RIGHT KNEE REPLACEMENT: ICD-10-CM

## 2025-02-21 PROCEDURE — 99024 POSTOP FOLLOW-UP VISIT: CPT | Performed by: ORTHOPAEDIC SURGERY

## 2025-02-21 PROCEDURE — 73560 X-RAY EXAM OF KNEE 1 OR 2: CPT

## 2025-02-21 RX ORDER — OXYCODONE HYDROCHLORIDE 5 MG/1
5 TABLET ORAL EVERY 6 HOURS PRN
Qty: 30 TABLET | Refills: 0 | Status: SHIPPED | OUTPATIENT
Start: 2025-02-21 | End: 2025-03-03

## 2025-02-21 RX ORDER — TIZANIDINE 2 MG/1
2 TABLET ORAL EVERY 8 HOURS PRN
Qty: 60 TABLET | Refills: 0 | Status: SHIPPED | OUTPATIENT
Start: 2025-02-21
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
## 2025-02-24 ENCOUNTER — TELEPHONE (OUTPATIENT)
Age: 70
End: 2025-02-24

## 2025-02-24 ENCOUNTER — OFFICE VISIT (OUTPATIENT)
Dept: PHYSICAL THERAPY | Facility: CLINIC | Age: 70
End: 2025-02-24
Payer: MEDICARE

## 2025-02-24 DIAGNOSIS — M17.11 PRIMARY OSTEOARTHRITIS OF RIGHT KNEE: Primary | ICD-10-CM

## 2025-02-24 DIAGNOSIS — Z96.651 STATUS POST TOTAL RIGHT KNEE REPLACEMENT: ICD-10-CM

## 2025-02-24 PROCEDURE — 97110 THERAPEUTIC EXERCISES: CPT

## 2025-02-24 PROCEDURE — 97530 THERAPEUTIC ACTIVITIES: CPT

## 2025-02-24 NOTE — TELEPHONE ENCOUNTER
Caller: Patient     Doctor: Dr. Murdock    Reason for call: Patient call requested to cancel post op scheduled 2/28, I see in notes for office visit patient follow up 3/21, does patient need 2/28 appointment? Please assist.    Call back#: [phone]

## 2025-02-24 NOTE — PROGRESS NOTES
"Daily Note     Today's date: 2025  Patient name: Shiva Shabazz  : 1955  MRN: [id number]  Referring provider: Deep Murdock,*  Dx:   Encounter Diagnosis     ICD-10-CM    1. Primary osteoarthritis of right knee  M17.11       2. Status post total right knee replacement  Z96.651                      Subjective: Pt reports she is feeling good.  Her swelling is going down and she was able to close her shoe today.      Objective: See treatment diary below      Assessment: AROM R knee 6-87*.  Improves to 3-93* following ROM exercises.  She demonstrates fair quad control with some difficulty maintaining knee extension during SLR when fatigued.  Demonstrates appropriate awareness of proper stair navigation during today's session.  Pt would benefit from continued PT.      Plan: Continue per plan of care.      Precautions: polymyalgia rheumatica,         POC expires Unit limit Auth Expiration date PT/OT/ST + Visit Limit?   25 BOMN 25 BOMN                           Visit/Unit Tracking  AUTH Status:  Date           Requires 10 visit PN Used 1 2 3 4 5     10 V PN      Remaining                    Koality:[email or web address]  Access Code: [email or web address]      Manuals                                                             Neuro Re-Ed             Quad set HEP 3\"X20 3\"x20 5''x20 5\"x20        Glute set  HEP             SAQ iso   3\" 2x10 5''x20 5\"x30                                                            Ther Ex             Pt Edu KS  KS AF          PROM   AF JS AF        Bike for ROM     6' half        Ankle pumps  HEP            Heel slides  HEP x20 x20 5''x20 5\"x20        LAQ HEP  x20 x20 x30                     Dock kick   3 min  3'         Gastroc stretch w srap   4x20\"  30\"x4 4x30'' 30\"x4        Seated knee flexion stretch   HEP           Seated knee extension stretch   HEP           SLR x2     2x10        Ther Activity         "     Stair navigation     X10 & edu                     Gait Training             Walker height adjustment   KS           TUG  33.43 sec w walker            Modalities

## 2025-02-27 ENCOUNTER — OFFICE VISIT (OUTPATIENT)
Dept: PHYSICAL THERAPY | Facility: CLINIC | Age: 70
End: 2025-02-27
Payer: MEDICARE

## 2025-02-27 DIAGNOSIS — Z96.651 STATUS POST TOTAL RIGHT KNEE REPLACEMENT: ICD-10-CM

## 2025-02-27 DIAGNOSIS — M17.11 PRIMARY OSTEOARTHRITIS OF RIGHT KNEE: Primary | ICD-10-CM

## 2025-02-27 PROCEDURE — 97110 THERAPEUTIC EXERCISES: CPT

## 2025-02-27 PROCEDURE — 97112 NEUROMUSCULAR REEDUCATION: CPT

## 2025-02-27 NOTE — PROGRESS NOTES
"Daily Note     Today's date: 2025  Patient name: Shiva Shabazz  : 1955  MRN: [id number]  Referring provider: Deep Murdock,*  Dx:   Encounter Diagnosis     ICD-10-CM    1. Primary osteoarthritis of right knee  M17.11       2. Status post total right knee replacement  Z96.651                      Subjective: Pt feels she is progressing well.        Objective: See treatment diary below      Assessment: AROM 3-102*.  Achieves 0-110* passively without significant c/o pain.  Quad control continues to improve.  Added weights for increased quad strength this visit.  Pt completes exercises without significant complications.  Pt would benefit from continued PT.      Plan: Continue per plan of care.      Precautions: polymyalgia rheumatica,         POC expires Unit limit Auth Expiration date PT/OT/ST + Visit Limit?   25 BOMN 25 BOMN                           Visit/Unit Tracking  AUTH Status:  Date          Requires 10 visit PN Used 1 2 3 4 5 6    10 V PN      Remaining                    [employer]:[email or web address]  Access Code: [email or web address]      Manuals                                                            Neuro Re-Ed             Quad set HEP 3\"X20 3\"x20 5''x20 5\"x20 3\"x30       Glute set  HEP             SAQ iso   3\" 2x10 5''x20 5\"x30 2# 5\"x20                                                           Ther Ex             Pt Edu KS  KS AF          PROM   AF JS AF AF       Bike for ROM     6' half 6' full       Ankle pumps  HEP            Heel slides  HEP x20 x20 5''x20 5\"x20 5\"x20       LAQ HEP  x20 x20 x30 2# 2x10                    Dock kick   3 min  3'         Gastroc stretch w srap   4x20\"  30\"x4 4x30'' 30\"x4 30\"x4       Seated knee flexion stretch   HEP           Seated knee extension stretch   HEP           SLR x2     2x10 2x10       Ther Activity             Stair navigation     X10 & edu                   "   Gait Training             Walker height adjustment   KS           TUG  33.43 sec w walker            Modalities

## 2025-02-28 DIAGNOSIS — Z96.651 STATUS POST TOTAL RIGHT KNEE REPLACEMENT: ICD-10-CM

## 2025-02-28 RX ORDER — OXYCODONE HYDROCHLORIDE 5 MG/1
5 TABLET ORAL EVERY 6 HOURS PRN
Qty: 30 TABLET | Refills: 0 | Status: SHIPPED | OUTPATIENT
Start: 2025-02-28 | End: 2025-03-10

## 2025-02-28 NOTE — TELEPHONE ENCOUNTER
Reason for call:   [x] Refill   [] Prior Auth  [] Other:     Office:   [] PCP/Provider -   [x] Specialty/Provider -     Medication:   oxyCODONE (Roxicodone) 5 immediate release tablet       Dose/Frequency: Sig: Take 1 tablet (5 mg total) by mouth every 6 (six) hours as needed for moderate pain for up to 10 days Max Daily Amount: 20 mg      Quantity: 30    Pharmacy: [facility]  [address]  [address] [place] [address]  Phone: [phone]  Fax: [phone]    Does the patient have enough for 3 days?   [] Yes   [x] No - Send as HP to POD

## 2025-02-28 NOTE — TELEPHONE ENCOUNTER
Refill must be reviewed and completed by the office or provider. The refill is unable to be approved or denied by the medication management team.    Refill can not be delegated         Patient Id Prescription # Filled Written Drug Label Qty Days Strength MME** Prescriber Pharmacy Payment REFILL #/Auth State Detail  1 [id number] 02/10/2025 02/10/2025 oxyCODONE HCL (Tablet) 30.0 7 5 MG 32.14 [facility] Commercial Insurance 0 / 0 [place]

## 2025-03-04 ENCOUNTER — OFFICE VISIT (OUTPATIENT)
Dept: PHYSICAL THERAPY | Facility: CLINIC | Age: 70
End: 2025-03-04
Payer: MEDICARE

## 2025-03-04 DIAGNOSIS — Z96.651 STATUS POST TOTAL RIGHT KNEE REPLACEMENT: ICD-10-CM

## 2025-03-04 DIAGNOSIS — M17.11 PRIMARY OSTEOARTHRITIS OF RIGHT KNEE: Primary | ICD-10-CM

## 2025-03-04 PROCEDURE — 97112 NEUROMUSCULAR REEDUCATION: CPT

## 2025-03-04 PROCEDURE — 97110 THERAPEUTIC EXERCISES: CPT

## 2025-03-04 NOTE — PROGRESS NOTES
"Daily Note     Today's date: 3/4/2025  Patient name: Shiva Shabazz  : 1955  MRN: [id number]  Referring provider: Deep Murdock,*  Dx:   Encounter Diagnosis     ICD-10-CM    1. Primary osteoarthritis of right knee  M17.11       2. Status post total right knee replacement  Z96.651                      Subjective: Reports continued improvements in R knee.  Did experience episode of sciatica pain over the weekend.      Objective: See treatment diary below      Assessment: AROM R knee 6-105*.  Achieves 0-110* of PROM.  Advised continuation of ROM exercises at home for maximum benefit.  Pt does experience difficulty performing HR/TR secondary to underlying nerve damage from previous injury.  Performs charted interventions without complications.  Pt would benefit from continued PT.      Plan: Continue per plan of care.      Precautions: polymyalgia rheumatica,         POC expires Unit limit Auth Expiration date PT/OT/ST + Visit Limit?   25 BOMN 25 BOMN                           Visit/Unit Tracking  AUTH Status:  Date 1/27  2/13 2/18 2/20 2/24 2/27 3/        Requires 10 visit PN Used 1 2 3 4 5 6 7   10 V PN      Remaining                    Good Thing:[email or web address]  Access Code: [email or web address]      Manuals  3/                                                          Neuro Re-Ed             Quad set HEP 3\"X20 3\"x20 5''x20 5\"x20 3\"x30 3\"x30      Glute set  HEP             SAQ iso   3\" 2x10 5''x20 5\"x30 2# 5\"x20 2# 5\"x20                                                          Ther Ex             Pt Edu KS  KS AF          PROM   AF JS AF AF AF      Bike for ROM     6' half 6' full 6' full      Ankle pumps  HEP            Heel slides  HEP x20 x20 5''x20 5\"x20 5\"x20 5\"x20      LAQ HEP  x20 x20 x30 2# 2x10 2# 3x10                   Dock kick   3 min  3'         Gastroc stretch w srap   4x20\"  30\"x4 4x30'' 30\"x4 30\"x4 30\"x4      Seated knee flexion " "stretch   HEP           Seated knee extension stretch   HEP           SLR x2     2x10 2x10 2x10      Ther Activity             Stair navigation     X10 & edu        Step up       2x10 6\"      Gait Training             Walker height adjustment   KS           TUG  33.43 sec w walker            Modalities                                                    "

## 2025-03-06 ENCOUNTER — OFFICE VISIT (OUTPATIENT)
Dept: PHYSICAL THERAPY | Facility: CLINIC | Age: 70
End: 2025-03-06
Payer: MEDICARE

## 2025-03-06 DIAGNOSIS — Z96.651 STATUS POST TOTAL RIGHT KNEE REPLACEMENT: ICD-10-CM

## 2025-03-06 DIAGNOSIS — M17.11 PRIMARY OSTEOARTHRITIS OF RIGHT KNEE: Primary | ICD-10-CM

## 2025-03-06 PROCEDURE — 97530 THERAPEUTIC ACTIVITIES: CPT | Performed by: PHYSICAL THERAPIST

## 2025-03-06 PROCEDURE — 97112 NEUROMUSCULAR REEDUCATION: CPT | Performed by: PHYSICAL THERAPIST

## 2025-03-06 PROCEDURE — 97110 THERAPEUTIC EXERCISES: CPT | Performed by: PHYSICAL THERAPIST

## 2025-03-06 NOTE — PROGRESS NOTES
"Daily Note     Today's date: 3/6/2025  Patient name: Shiva Shabazz  : 1955  MRN: [id number]  Referring provider: Deep Murdock,*  Dx:   Encounter Diagnosis     ICD-10-CM    1. Primary osteoarthritis of right knee  M17.11       2. Status post total right knee replacement  Z96.651                      Subjective: Patient states she continues to experience R sciatica symptoms. She states her knee is doing better.        Objective: See treatment diary below  Active knee extension with quad set: 3*   Active knee flexion with heel slide: 107*    with overpressure: 114 degrees      Assessment:  Patient does well with session. She exhibits improved knee flexion ROM. Educated patient that she be experiencing sciatica symptoms as her leg alignment has changed since surgery and it may be affecting her gait.  She does well with session and is able to progress exercises. No complaints post session.        Plan: Continue per plan of care.      Precautions: polymyalgia rheumatica,         POC expires Unit limit Auth Expiration date PT/OT/ST + Visit Limit?   25 BOMN 25 BOMN                           Visit/Unit Tracking  AUTH Status:  Date  3/4 3/       Requires 10 visit PN Used 1 2 3 4 5 6 7 8  10 V PN      Remaining                    [email or web address]:[email or web address]  Access Code: [email or web address]      Manuals  3/4 3/6                                                         Neuro Re-Ed             Quad set HEP 3\"X20 3\"x20 5''x20 5\"x20 3\"x30 3\"x30 3\"x20      Glute set  HEP             SAQ iso   3\" 2x10 5''x20 5\"x30 2# 5\"x20 2# 5\"x20      Prone quad set         3\"x20      Biodex LOS         Static x 4                                Ther Ex             Pt Edu KS  KS AF     KS     PROM   AF JS AF AF AF KS     Bike for ROM     6' half 6' full 6' full Rock x5'; full x 3'      Ankle pumps  HEP            Heel slides  HEP x20 x20 5''x20 5\"x20 " "5\"x20 5\"x20 5\" x20      LAQ HEP  x20 x20 x30 2# 2x10 2# 3x10                   Dock kick   3 min  3'         Gastroc stretch w srap   4x20\"  30\"x4 4x30'' 30\"x4 30\"x4 30\"x4 4x20\"      HS stretch by PT         4x20\"      Seated knee flexion stretch   HEP           Seated knee extension stretch   HEP           SLR x2     2x10 2x10 2x10 2x10      Ther Activity             Lateral step overs         6\" 2x10      Step up       2x10 6\" 6\" 2x10      Step taps         Biodex x20      Gait Training             Walker height adjustment   KS           TUG  33.43 sec w walker            Modalities                                                    "

## 2025-03-11 ENCOUNTER — OFFICE VISIT (OUTPATIENT)
Dept: PHYSICAL THERAPY | Facility: CLINIC | Age: 70
End: 2025-03-11
Payer: MEDICARE

## 2025-03-11 DIAGNOSIS — M17.11 PRIMARY OSTEOARTHRITIS OF RIGHT KNEE: Primary | ICD-10-CM

## 2025-03-11 DIAGNOSIS — Z96.651 STATUS POST TOTAL RIGHT KNEE REPLACEMENT: ICD-10-CM

## 2025-03-11 PROCEDURE — 97110 THERAPEUTIC EXERCISES: CPT | Performed by: PHYSICAL THERAPIST

## 2025-03-11 PROCEDURE — 97112 NEUROMUSCULAR REEDUCATION: CPT | Performed by: PHYSICAL THERAPIST

## 2025-03-11 NOTE — PROGRESS NOTES
"Daily Note     Today's date: 3/11/2025  Patient name: Shiva Shabazz  : 1955  MRN: [id number]  Referring provider: Deep Murdock,*  Dx:   Encounter Diagnosis     ICD-10-CM    1. Primary osteoarthritis of right knee  M17.11       2. Status post total right knee replacement  Z96.651                      Subjective: Patient states she continues to experience R sciatica symptoms, but she states, \"I lost three lbs and if I can lose the 10 lbs I gained, I think I will feel better.\" She states she uses her SPC at home, but arrives today with walker.       Objective: See treatment diary below  Active knee extension with quad set: 0*   Active knee flexion with heel slide: 110*    with overpressure: 117 degrees      Assessment:  Patient does well with session. She continues to exhibit improved passive and active ROM. No difficulty during today's exercises. Overall, she is progressing well.  She performs ambulation in clinic with cane, but is advised to continue with walker in the community at this time.        Plan: Continue per plan of care.      Precautions: polymyalgia rheumatica,         POC expires Unit limit Auth Expiration date PT/OT/ST + Visit Limit?   25 BOMN 25 BOMN                           Visit/Unit Tracking  AUTH Status:  Date 1/27  2/13 2/18 2/20 2/24 2/27 3/4 3/6 3/11      Requires 10 visit PN Used 1 2 3 4 5 6 7 8 9 10 V PN      Remaining                    Delpor:[email or web address]  Access Code: [email or web address]      Manuals  3/ 3/6 3/11                                                        Neuro Re-Ed             Quad set HEP 3\"X20 3\"x20 5''x20 5\"x20 3\"x30 3\"x30 3\"x20  3\"X20     Glute set  HEP             SAQ iso   3\" 2x10 5''x20 5\"x30 2# 5\"x20 2# 5\"x20      Prone quad set         3\"x20      Biodex LOS         Static x 4  Lv 12 x 6                               Ther Ex             Pt Edu KS  KS AF     KS KS    PROM   AF JS AF AF AF KS " "KS    Bike for ROM     6' half 6' full 6' full Rock x5'; full x 3'  Full 7'     Ankle pumps  HEP            Heel slides  HEP x20 x20 5''x20 5\"x20 5\"x20 5\"x20 5\" x20  5\"X20     LAQ HEP  x20 x20 x30 2# 2x10 2# 3x10  2# 2x10 ea                  Dock kick   3 min  3'         Gastroc stretch w srap   4x20\"  30\"x4 4x30'' 30\"x4 30\"x4 30\"x4 4x20\"      HS stretch by PT         4x20\"      Seated knee flexion stretch   HEP       10\"X 10     Standing ham curl          2x10 ea     SLR x2     2x10 2x10 2x10 2x10  2# 2x10 flex     Ther Activity             Lateral step overs         6\" 2x10      Step up       2x10 6\" 6\" 2x10      Step taps         Biodex x20      Mini squat          2x10     TG squat          2x10     Gait Training             Walker height adjustment   KS           TUG  33.43 sec w walker            Modalities                                                    "

## 2025-03-13 ENCOUNTER — OFFICE VISIT (OUTPATIENT)
Dept: PHYSICAL THERAPY | Facility: CLINIC | Age: 70
End: 2025-03-13
Payer: MEDICARE

## 2025-03-13 DIAGNOSIS — M17.11 PRIMARY OSTEOARTHRITIS OF RIGHT KNEE: Primary | ICD-10-CM

## 2025-03-13 DIAGNOSIS — Z96.651 STATUS POST TOTAL RIGHT KNEE REPLACEMENT: ICD-10-CM

## 2025-03-13 PROCEDURE — 97110 THERAPEUTIC EXERCISES: CPT | Performed by: PHYSICAL THERAPIST

## 2025-03-13 PROCEDURE — 97112 NEUROMUSCULAR REEDUCATION: CPT | Performed by: PHYSICAL THERAPIST

## 2025-03-13 RX ORDER — OXYCODONE HYDROCHLORIDE 5 MG/1
5 TABLET ORAL EVERY 6 HOURS PRN
Qty: 30 TABLET | Refills: 0 | Status: SHIPPED | OUTPATIENT
Start: 2025-03-13 | End: 2025-03-23

## 2025-03-13 NOTE — PROGRESS NOTES
"10 Visit Progress Note      Today's date: 3/13/2025  Patient name: Shiva Shabazz  : 1955  MRN: [id number]  Referring provider: Deep Murdock,*  Dx:   Encounter Diagnosis     ICD-10-CM    1. Primary osteoarthritis of right knee  M17.11       2. Status post total right knee replacement  Z96.651                      Subjective: Patient continues to be pleased with her progress each visit.       Objective: See treatment diary below  Active knee extension with quad set: -1*   Active knee flexion with heel slide: 113*    with overpressure: 120 degrees    STG in 6 weeks:   1. Improve TUG to <13 sec with most appropriate AD.  TO BE ASSESSED   2. Improve knee extension to 0 degrees.  IN PROGRESS  3. Improve knee flexion to 120 degrees.  MET     LTG in 12 weeks:   1. Improve knee strength to 4+. IN PROGRESS  2. Patient to be able to perform reciprocal stairs.  IN PROGRESS  3. Patient to be independent in comprehensive HEP.  MET       Assessment:  Patient does well with session. Her knee ROM has achieved 120 degrees. Today, she is slightly limited with knee extension ROM. No complaints of pain during or post session. She does ambulate in clinic with SPC and is encouraged to practice with SPC while at home.        Plan: Continue per plan of care.      Precautions: polymyalgia rheumatica,         POC expires Unit limit Auth Expiration date PT/OT/ST + Visit Limit?   25 BOMN 25 BOMN                           Visit/Unit Tracking  AUTH Status:  Date  3/ 3/6 3/11 3/13     Requires 10 visit PN Used 1 2 3 4 5 6 7 8 9 10 V PN       Remaining                    [email or web address]:[email or web address]  Access Code: [email or web address]      Manuals  3/ 3/6 3/11 3/13                                                       Neuro Re-Ed             Quad set HEP 3\"X20 3\"x20 5''x20 5\"x20 3\"x30 3\"x30 3\"x20  3\"X20  3\"X20    Glute set  HEP             SAQ iso   3\" " "2x10 5''x20 5\"x30 2# 5\"x20 2# 5\"x20      Prone quad set         3\"x20      Biodex LOS         Static x 4  Lv 12 x 6  Lv 12/11 x 6                              Ther Ex             Pt Edu KS  KS AF     KS KS KS   PROM   AF JS AF AF AF KS KS KS   Bike for ROM     6' half 6' full 6' full Rock x5'; full x 3'  Full 7'  Full rev 8'    Ankle pumps  HEP            Heel slides  HEP x20 x20 5''x20 5\"x20 5\"x20 5\"x20 5\" x20  5\"X20  5\" x20    LAQ HEP  x20 x20 x30 2# 2x10 2# 3x10  2# 2x10 ea  2# 2x10 ea                 Dock kick   3 min  3'         Gastroc stretch w srap   4x20\"  30\"x4 4x30'' 30\"x4 30\"x4 30\"x4 4x20\"      HS stretch by PT         4x20\"      Seated knee flexion stretch   HEP       10\"X 10     Standing ham curl          2x10 ea     SLR x2     2x10 2x10 2x10 2x10  2# 2x10 flex  2# 2x10 ea flex   Ther Activity             Lateral step overs         6\" 2x10      Step up       2x10 6\" 6\" 2x10      Step taps         Biodex x20      Mini squat          2x10     TG squat          2x10  2x10    Gait Training             Amb with cane           Around clinic    TUG  33.43 sec w walker            Modalities                                                    "

## 2025-03-14 DIAGNOSIS — Z96.651 STATUS POST TOTAL RIGHT KNEE REPLACEMENT: ICD-10-CM

## 2025-03-14 RX ORDER — OXYCODONE HYDROCHLORIDE 5 MG/1
5 TABLET ORAL EVERY 6 HOURS PRN
Qty: 30 TABLET | Refills: 0 | Status: CANCELLED | OUTPATIENT
Start: 2025-03-14 | End: 2025-03-24

## 2025-03-14 NOTE — TELEPHONE ENCOUNTER
Patient called to request Oxycodone was informed a new Rx was sent on 3/13/25 for 30 tablets. She will contact pharmacy.

## 2025-03-15 ENCOUNTER — HOSPITAL ENCOUNTER (OUTPATIENT)
Dept: CT IMAGING | Facility: HOSPITAL | Age: 70
Discharge: HOME | End: 2025-03-15
Attending: OTOLARYNGOLOGY
Payer: MEDICARE

## 2025-03-15 DIAGNOSIS — J32.0 CHRONIC MAXILLARY SINUSITIS: ICD-10-CM

## 2025-03-15 DIAGNOSIS — J31.0 NONALLERGIC RHINITIS: ICD-10-CM

## 2025-03-15 PROCEDURE — 70486 CT MAXILLOFACIAL W/O DYE: CPT

## 2025-03-18 ENCOUNTER — OFFICE VISIT (OUTPATIENT)
Dept: PHYSICAL THERAPY | Facility: CLINIC | Age: 70
End: 2025-03-18
Payer: MEDICARE

## 2025-03-18 DIAGNOSIS — M17.11 PRIMARY OSTEOARTHRITIS OF RIGHT KNEE: Primary | ICD-10-CM

## 2025-03-18 DIAGNOSIS — Z96.651 STATUS POST TOTAL RIGHT KNEE REPLACEMENT: ICD-10-CM

## 2025-03-18 PROCEDURE — 97110 THERAPEUTIC EXERCISES: CPT | Performed by: PHYSICAL THERAPIST

## 2025-03-18 PROCEDURE — 97112 NEUROMUSCULAR REEDUCATION: CPT | Performed by: PHYSICAL THERAPIST

## 2025-03-18 NOTE — PROGRESS NOTES
"Daily Note     Today's date: 3/18/2025  Patient name: Shiva Shabazz  : 1955  MRN: [id number]  Referring provider: Deep Murdock,*  Dx:   Encounter Diagnosis     ICD-10-CM    1. Primary osteoarthritis of right knee  M17.11       2. Status post total right knee replacement  Z96.651                      Subjective: She states she didn't take pain meds for the last two nights. She reports doing well.       Objective: See treatment diary below      Assessment: Tolerated treatment well. No issues during or post session. She ambulates around clinic with SPC. She achieves 121 and 0 degrees of knee flexion and extension, respectively. Patient would benefit from continued PT      Plan: Continue per plan of care.      Precautions: polymyalgia rheumatica,         POC expires Unit limit Auth Expiration date PT/OT/ST + Visit Limit?   25 BOMN 25 BOMN                           Visit/Unit Tracking  AUTH Status:  Date  3/4 3/6 3/11 3/13 3/18     Requires 10 visit PN Used 1 2 3 4 5 6 7 8 9 10 V PN 11     Remaining                    [email or web address]  Access Code: [email or web address]      Manuals  3/4 3/6 3/11 3/18                                                        Neuro Re-Ed             Quad set HEP 3\"X20 3\"x20 5''x20 5\"x20 3\"x30 3\"x30 3\"x20  3\"X20  3\"x20    Glute set  HEP             SAQ iso   3\" 2x10 5''x20 5\"x30 2# 5\"x20 2# 5\"x20      Prone quad set         3\"x20      Biodex LOS         Static x 4  Lv 12 x 6  Lv 11 floor/foam x 3 ea                              Ther Ex             Pt Edu KS  KS AF     KS KS KS   PROM   AF JS AF AF AF KS KS    Bike for ROM     6' half 6' full 6' full Rock x5'; full x 3'  Full 7'  10' full    Ankle pumps  HEP            Heel slides  HEP x20 x20 5''x20 5\"x20 5\"x20 5\"x20 5\" x20  5\"X20  5\"x20    LAQ HEP  x20 x20 x30 2# 2x10 2# 3x10  2# 2x10 ea                  Dock kick   3 min  3'       " "  Gastroc stretch w srap   4x20\"  30\"x4 4x30'' 30\"x4 30\"x4 30\"x4 4x20\"      HS stretch by PT         4x20\"      Seated knee flexion stretch   HEP       10\"X 10     Standing ham curl          2x10 ea     SLR x2     2x10 2x10 2x10 2x10  2# 2x10 flex  2x10 w QS    Ther Activity             Lateral step overs         6\" 2x10   6\" x20    Step up       2x10 6\" 6\" 2x10   6\" 2x10 ea leg    Step taps         Biodex x20      Mini squat          2x10     TG squat          2x10     Gait Training             Amb with cane           Around clinic    TUG  33.43 sec w walker            Modalities                                                      "

## 2025-03-20 ENCOUNTER — OFFICE VISIT (OUTPATIENT)
Dept: PHYSICAL THERAPY | Facility: CLINIC | Age: 70
End: 2025-03-20
Payer: MEDICARE

## 2025-03-20 DIAGNOSIS — Z96.651 STATUS POST TOTAL RIGHT KNEE REPLACEMENT: ICD-10-CM

## 2025-03-20 DIAGNOSIS — M17.11 PRIMARY OSTEOARTHRITIS OF RIGHT KNEE: Primary | ICD-10-CM

## 2025-03-20 PROCEDURE — 97112 NEUROMUSCULAR REEDUCATION: CPT | Performed by: PHYSICAL THERAPIST

## 2025-03-20 PROCEDURE — 97110 THERAPEUTIC EXERCISES: CPT | Performed by: PHYSICAL THERAPIST

## 2025-03-20 NOTE — PROGRESS NOTES
"Daily Note     Today's date: 3/20/2025  Patient name: Shiva Shabazz  : 1955  MRN: [id number]  Referring provider: Deep Murdock,*  Dx:   Encounter Diagnosis     ICD-10-CM    1. Primary osteoarthritis of right knee  M17.11       2. Status post total right knee replacement  Z96.651                      Subjective: She states she only took two pain pills this week. States she is doing well. She arrives with SPC for ambulation.       Objective: See treatment diary below      Assessment: Tolerated treatment well.  She does well with session. No increased pain during or post session.  She ambulates with SPC; 13.39 sec for TUG this visit.  It's recommended that she continue with her cane for ambulation.  Patient would benefit from continued PT      Plan: Continue per plan of care.      Precautions: polymyalgia rheumatica,         POC expires Unit limit Auth Expiration date PT/OT/ST + Visit Limit?   25 BOMN 25 BOMN                           Visit/Unit Tracking  AUTH Status:  Date     3/4 3/6 3/11 3/13 3/18  3/20    Requires 10 visit PN Used    4 5 6 7 8 9 10 V PN 11 12    Remaining                    [email or web address]  Access Code: [email or web address]      Manuals 3/20    2/20 2/24 2/27 3/4 3/6 3/11 3/18                                                        Neuro Re-Ed             Quad set    5''x20 5\"x20 3\"x30 3\"x30 3\"x20  3\"X20  3\"x20    Glute set              SAQ iso    5''x20 5\"x30 2# 5\"x20 2# 5\"x20      Prone quad set  3\"X20        3\"x20      Biodex LOS  Lv 11 foam x 5        Static x 4  Lv 12 x 6  Lv 11 floor/foam x 3 ea                              Ther Ex             Pt Edu KS       KS KS KS   PROM    JS AF AF AF KS KS    Bike for ROM 10' full     6' half 6' full 6' full Rock x5'; full x 3'  Full 7'  10' full    Ankle pumps              Heel slides  5\"x20    5''x20 5\"x20 5\"x20 5\"x20 5\" x20  5\"X20  5\"x20    LAQ 2# 2x10 ea   x20 x30 2# 2x10 2# 3x10  2# 2x10 " "ea                  Dock kick     3'         Gastroc stretch w srap     4x30'' 30\"x4 30\"x4 30\"x4 4x20\"      HS stretch by PT         4x20\"      Seated knee flexion stretch           10\"X 10     Standing ham curl          2x10 ea     SLR x2 W QS flex 2# 2x10 ea    2x10 2x10 2x10 2x10  2# 2x10 flex  2x10 w QS    Ther Activity             Lateral step overs         6\" 2x10   6\" x20    Step up       2x10 6\" 6\" 2x10   6\" 2x10 ea leg    Step taps         Biodex x20      Mini squat  2x10         2x10     TG squat          2x10     Leg press  65# x10; 75# x10; 85# x10             Leg press- uni  45# 2x10             Gait Training             Amb with cane           Around clinic    TUG W cane 13.39 sec             Modalities                                                      "

## 2025-03-21 ENCOUNTER — OFFICE VISIT (OUTPATIENT)
Dept: OBGYN CLINIC | Facility: MEDICAL CENTER | Age: 70
End: 2025-03-21
Payer: MEDICARE

## 2025-03-21 VITALS — HEIGHT: 66 IN | WEIGHT: 180 LBS | BODY MASS INDEX: 28.93 KG/M2

## 2025-03-21 DIAGNOSIS — M17.12 PRIMARY OSTEOARTHRITIS OF LEFT KNEE: ICD-10-CM

## 2025-03-21 DIAGNOSIS — Z96.651 STATUS POST TOTAL RIGHT KNEE REPLACEMENT: Primary | ICD-10-CM

## 2025-03-21 PROCEDURE — 20610 DRAIN/INJ JOINT/BURSA W/O US: CPT | Performed by: ORTHOPAEDIC SURGERY

## 2025-03-21 PROCEDURE — 99213 OFFICE O/P EST LOW 20 MIN: CPT | Performed by: ORTHOPAEDIC SURGERY

## 2025-03-21 RX ORDER — LIDOCAINE HYDROCHLORIDE 10 MG/ML
2 INJECTION, SOLUTION INFILTRATION; PERINEURAL
Status: COMPLETED | OUTPATIENT
Start: 2025-03-21 | End: 2025-03-21

## 2025-03-21 RX ORDER — BETAMETHASONE SODIUM PHOSPHATE AND BETAMETHASONE ACETATE 3; 3 MG/ML; MG/ML
12 INJECTION, SUSPENSION INTRA-ARTICULAR; INTRALESIONAL; INTRAMUSCULAR; SOFT TISSUE
Status: COMPLETED | OUTPATIENT
Start: 2025-03-21 | End: 2025-03-21

## 2025-03-21 RX ORDER — BUPIVACAINE HYDROCHLORIDE 2.5 MG/ML
2 INJECTION, SOLUTION INFILTRATION; PERINEURAL
Status: COMPLETED | OUTPATIENT
Start: 2025-03-21 | End: 2025-03-21

## 2025-03-21 RX ADMIN — BETAMETHASONE SODIUM PHOSPHATE AND BETAMETHASONE ACETATE 12 MG: 3; 3 INJECTION, SUSPENSION INTRA-ARTICULAR; INTRALESIONAL; INTRAMUSCULAR; SOFT TISSUE at 14:00

## 2025-03-21 RX ADMIN — BUPIVACAINE HYDROCHLORIDE 2 ML: 2.5 INJECTION, SOLUTION INFILTRATION; PERINEURAL at 14:00

## 2025-03-21 RX ADMIN — LIDOCAINE HYDROCHLORIDE 2 ML: 10 INJECTION, SOLUTION INFILTRATION; PERINEURAL at 14:00
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
## 2025-03-25 ENCOUNTER — OFFICE VISIT (OUTPATIENT)
Dept: PHYSICAL THERAPY | Facility: CLINIC | Age: 70
End: 2025-03-25
Payer: MEDICARE

## 2025-03-25 DIAGNOSIS — M17.11 PRIMARY OSTEOARTHRITIS OF RIGHT KNEE: Primary | ICD-10-CM

## 2025-03-25 DIAGNOSIS — Z96.651 STATUS POST TOTAL RIGHT KNEE REPLACEMENT: ICD-10-CM

## 2025-03-25 PROCEDURE — 97110 THERAPEUTIC EXERCISES: CPT

## 2025-03-25 PROCEDURE — 97112 NEUROMUSCULAR REEDUCATION: CPT

## 2025-03-25 NOTE — PROGRESS NOTES
"Daily Note     Today's date: 3/25/2025  Patient name: Shiva Shabazz  : 1955  MRN: [id number]  Referring provider: Deep Murdock,*  Dx:   Encounter Diagnosis     ICD-10-CM    1. Primary osteoarthritis of right knee  M17.11       2. Status post total right knee replacement  Z96.651                      Subjective: Pt offers no new complaints upon arrival to session.      Objective: See treatment diary below      Assessment: AROM R knee 3-112*, PROM 0-123*.  She does demonstrate decreased functional TKE.  Performs charted interventions without complications.  Pt would benefit from continued PT.      Plan: Continue per plan of care.      Precautions: polymyalgia rheumatica,         POC expires Unit limit Auth Expiration date PT/OT/ST + Visit Limit?   25 BOMN 25 BOMN                           Visit/Unit Tracking  AUTH Status:  Date 3/25   2/20 2/24 2/27 3/4 3/6 3/11 3/13 3/18  3/20    Requires 10 visit PN Used 13   4 5 6 7 8 9 10 V PN 11 12    Remaining                    [employer]:[email or web address]  Access Code: [email or web address]      Manuals 3/20  3/25  2/20 2/24 2/27 3/4 3/6 3/11 3/18                                                        Neuro Re-Ed             Quad set    5''x20 5\"x20 3\"x30 3\"x30 3\"x20  3\"X20  3\"x20    Glute set              SAQ iso    5''x20 5\"x30 2# 5\"x20 2# 5\"x20      Prone quad set  3\"X20        3\"x20      Biodex LOS  Lv 11 foam x 5  L10 x4 foam      Static x 4  Lv 12 x 6  Lv 11 floor/foam x 3 ea    TKE  10# 3\"x20                        Ther Ex             Pt Edu KS AF      KS KS KS   PROM    JS AF AF AF KS KS    Bike for ROM 10' full  10' full   6' half 6' full 6' full Rock x5'; full x 3'  Full 7'  10' full    Ankle pumps              Heel slides  5\"x20  5\"x30  5''x20 5\"x20 5\"x20 5\"x20 5\" x20  5\"X20  5\"x20    LAQ 2# 2x10 ea 2# 3x10 ea  x20 x30 2# 2x10 2# 3x10  2# 2x10 ea                  Neo dewey     3'         Gastroc stretch w srap     4x30'' 30\"x4 " "30\"x4 30\"x4 4x20\"      HS stretch by PT         4x20\"      Seated knee flexion stretch           10\"X 10     Standing ham curl          2x10 ea     SLR x2 W QS flex 2# 2x10 ea C QS flex 2# 2x10 & abd   2x10 2x10 2x10 2x10  2# 2x10 flex  2x10 w QS    Ther Activity             Lateral step overs         6\" 2x10   6\" x20    Step up       2x10 6\" 6\" 2x10   6\" 2x10 ea leg    Step taps         Biodex x20      Mini squat  2x10         2x10     TG squat          2x10     Leg press  65# x10; 75# x10; 85# x10  85# 3x10           Leg press- uni  45# 2x10  45# 3x10           Gait Training             Amb with cane           Around clinic    TUG W cane 13.39 sec             Modalities                                                        "

## 2025-03-26 ENCOUNTER — RESULTS FOLLOW-UP (OUTPATIENT)
Dept: FAMILY MEDICINE CLINIC | Facility: CLINIC | Age: 70
End: 2025-03-26

## 2025-03-26 ENCOUNTER — HOSPITAL ENCOUNTER (OUTPATIENT)
Age: 70
Discharge: HOME | End: 2025-03-26
Payer: MEDICARE

## 2025-03-26 VITALS — BODY MASS INDEX: 29.16 KG/M2 | WEIGHT: 175 LBS | HEIGHT: 65 IN

## 2025-03-26 DIAGNOSIS — M85.89 OSTEOPENIA OF MULTIPLE SITES: ICD-10-CM

## 2025-03-26 PROCEDURE — 77080 DXA BONE DENSITY AXIAL: CPT

## 2025-03-27 ENCOUNTER — OFFICE VISIT (OUTPATIENT)
Dept: PHYSICAL THERAPY | Facility: CLINIC | Age: 70
End: 2025-03-27
Payer: MEDICARE

## 2025-03-27 DIAGNOSIS — Z96.651 STATUS POST TOTAL RIGHT KNEE REPLACEMENT: ICD-10-CM

## 2025-03-27 DIAGNOSIS — M17.11 PRIMARY OSTEOARTHRITIS OF RIGHT KNEE: Primary | ICD-10-CM

## 2025-03-27 PROCEDURE — 97112 NEUROMUSCULAR REEDUCATION: CPT | Performed by: PHYSICAL THERAPIST

## 2025-03-27 PROCEDURE — 97110 THERAPEUTIC EXERCISES: CPT | Performed by: PHYSICAL THERAPIST

## 2025-03-27 PROCEDURE — 97530 THERAPEUTIC ACTIVITIES: CPT | Performed by: PHYSICAL THERAPIST

## 2025-03-27 NOTE — PROGRESS NOTES
"Daily Note     Today's date: 3/27/2025  Patient name: Shiva Shabazz  : 1955  MRN: [id number]  Referring provider: Deep Murdock,*  Dx:   Encounter Diagnosis     ICD-10-CM    1. Primary osteoarthritis of right knee  M17.11       2. Status post total right knee replacement  Z96.651                        Subjective: Pt states she is doing very well.        Objective: See treatment diary below      Assessment: AROM R knee lacking a few degrees of extension initially, but achieve 0 degrees following stretches and when in long sit position.  Knee flexion to 118* without heel slide or other stretch.   She is doing well with listed exercises.  Focused on knee extension this visit.        Plan: Continue per plan of care.      Precautions: polymyalgia rheumatica,         POC expires Unit limit Auth Expiration date PT/OT/ST + Visit Limit?   25 BOMN 25 BOMN                           Visit/Unit Tracking  AUTH Status:  Date 3/25 3/27   2/20 2/24 2/27 3/4 3/6 3/11 3/13 3/18  3/20    Requires 10 visit PN Used 13 12  4 5 6 7 8 9 10 V PN 11 12    Remaining                    [employer]:[email or web address]  Access Code: [email or web address]      Manuals 3/20  3/25 3/27    2/27 3/4 3/6 3/11 3/18                                                        Neuro Re-Ed             Quad set      3\"x30 3\"x30 3\"x20  3\"X20  3\"x20    Glute set              SAQ iso      2# 5\"x20 2# 5\"x20      Prone quad set  3\"X20   3\"x20      3\"x20      Biodex LOS  Lv 11 foam x 5  L10 x4 foam L10 x5 foam      Static x 4  Lv 12 x 6  Lv 11 floor/foam x 3 ea    TKE  10# 3\"x20 NV          DLS seated PB push- TA    3\"X20           Ther Ex             Pt Edu KS AF KS     KS KS KS   PROM      AF AF KS KS    Bike for ROM 10' full  10' full 10' full    6' full 6' full Rock x5'; full x 3'  Full 7'  10' full    Ankle pumps              Heel slides  5\"x20  5\"x30    5\"x20 5\"x20 5\" x20  5\"X20  5\"x20    LAQ 2# 2x10 ea 2# 3x10 ea 2# 2x10 ea    2# " "2x10 2# 3x10  2# 2x10 ea                  Dock kick              Gastroc stretch w srap       30\"x4 30\"x4 4x20\"      HS stretch by PT         4x20\"      Seated knee flexion stretch           10\"X 10     Standing ham curl          2x10 ea     SLR x2 W QS flex 2# 2x10 ea C QS flex 2# 2x10 & abd W QS 2# 2x10    2x10 2x10 2x10  2# 2x10 flex  2x10 w QS    Knee hang for ext    2# x2 min          Ther Activity             Lateral step overs         6\" 2x10   6\" x20    Step up       2x10 6\" 6\" 2x10   6\" 2x10 ea leg    Step taps         Biodex x20      Mini squat  2x10   TRX 2x10       2x10     TG squat          2x10     Leg press  65# x10; 75# x10; 85# x10  85# 3x10 95# 2x10          Leg press- uni  45# 2x10  45# 3x10 45# 2x10           Gait Training             Amb with cane           Around clinic    TUG W cane 13.39 sec             Modalities                                                        "

## 2025-04-03 ENCOUNTER — OFFICE VISIT (OUTPATIENT)
Dept: PHYSICAL THERAPY | Facility: CLINIC | Age: 70
End: 2025-04-03
Payer: MEDICARE

## 2025-04-03 DIAGNOSIS — M17.11 PRIMARY OSTEOARTHRITIS OF RIGHT KNEE: Primary | ICD-10-CM

## 2025-04-03 DIAGNOSIS — Z96.651 STATUS POST TOTAL RIGHT KNEE REPLACEMENT: ICD-10-CM

## 2025-04-03 PROCEDURE — 97530 THERAPEUTIC ACTIVITIES: CPT | Performed by: PHYSICAL THERAPIST

## 2025-04-03 PROCEDURE — 97112 NEUROMUSCULAR REEDUCATION: CPT | Performed by: PHYSICAL THERAPIST

## 2025-04-03 PROCEDURE — 97110 THERAPEUTIC EXERCISES: CPT | Performed by: PHYSICAL THERAPIST

## 2025-04-03 NOTE — PROGRESS NOTES
"Daily Note     Today's date: 4/3/2025  Patient name: Shiva Shabazz  : 1955  MRN: [id number]  Referring provider: Deep Murdock,*  Dx:   Encounter Diagnosis     ICD-10-CM    1. Primary osteoarthritis of right knee  M17.11       2. Status post total right knee replacement  Z96.651                        Subjective: Pt states her whole body is achey due to weather. She states her back is bothering her.        Objective: See treatment diary below      Assessment: Increased time spent on patient education of importance of core and hip strength to improve walking and decrease back pain.  Added TA and adductor set this visit.  Reduced back pain from start to finish of session.  She achieves 0 degrees of knee extension and 116 degrees of knee flexion. Slightly tighter with knee flexion this visit due to weather.  She is encouraged to incorporate TA & Adductor set as part of HEP.       Plan: Continue per plan of care.      Precautions: polymyalgia rheumatica,         POC expires Unit limit Auth Expiration date PT/OT/ST + Visit Limit?   25 BOMN 25 BOMN                           Visit/Unit Tracking  AUTH Status:  Date 3/25 3/27  4/3    3/4 3/6 3/11 3/13 3/18  3/20    Requires 10 visit PN Used 13 14 15    7 8 9 10 V PN 11 12    Remaining                    [employer]:[email or web address]  Access Code: [email or web address]      Manuals 3/20  3/25 3/27  4/3  2/27 3/4 3/6 3/11 3/18                                                        Neuro Re-Ed             Quad set      3\"x30 3\"x30 3\"x20  3\"X20  3\"x20    Glute set              SAQ iso      2# 5\"x20 2# 5\"x20      Prone quad set  3\"X20   3\"x20      3\"x20      Biodex LOS  Lv 11 foam x 5  L10 x4 foam L10 x5 foam  L9 foam x 5     Static x 4  Lv 12 x 6  Lv 11 floor/foam x 3 ea    TKE  10# 3\"x20 NV          DLS seated PB push- TA    3\"X20           TA    3\"X20          Adductor set     3\"x20          Ther Ex             Pt Edu KS AF KS KS    KS KS KS " "  PROM      AF AF KS KS    Bike for ROM 10' full  10' full 10' full  10' full   6' full 6' full Rock x5'; full x 3'  Full 7'  10' full    Ankle pumps              Heel slides  5\"x20  5\"x30    5\"x20 5\"x20 5\" x20  5\"X20  5\"x20    LAQ 2# 2x10 ea 2# 3x10 ea 2# 2x10 ea  3# 2x10 ea  2# 2x10 2# 3x10  2# 2x10 ea                  Dock kick              Gastroc stretch w srap       30\"x4 30\"x4 4x20\"      HS stretch by PT         4x20\"      Seated knee flexion stretch           10\"X 10     Standing ham curl          2x10 ea     SLR x2 W QS flex 2# 2x10 ea C QS flex 2# 2x10 & abd W QS 2# 2x10    2x10 2x10 2x10  2# 2x10 flex  2x10 w QS    Knee hang for ext    2# x2 min          Ther Activity             Lateral step overs         6\" 2x10   6\" x20    Step up    Biodex 2x10 ea    2x10 6\" 6\" 2x10   6\" 2x10 ea leg    Step taps         Biodex x20      Mini squat  2x10   TRX 2x10  2x10      2x10     TG squat          2x10     Leg press  65# x10; 75# x10; 85# x10  85# 3x10 95# 2x10 95# 2x10; 105# x10          Leg press- uni  45# 2x10  45# 3x10 45# 2x10           Retrogait- Eckert     11# x10                       Gait Training                          TUG W cane 13.39 sec             Modalities                                                        "

## 2025-04-04 ENCOUNTER — RESULTS FOLLOW-UP (OUTPATIENT)
Dept: RHEUMATOLOGY | Facility: CLINIC | Age: 70
End: 2025-04-04

## 2025-04-04 ENCOUNTER — APPOINTMENT (OUTPATIENT)
Dept: LAB | Facility: HOSPITAL | Age: 70
End: 2025-04-04
Payer: MEDICARE

## 2025-04-04 DIAGNOSIS — M06.00 SERONEGATIVE RHEUMATOID ARTHRITIS (HCC): ICD-10-CM

## 2025-04-04 DIAGNOSIS — Z79.899 HIGH RISK MEDICATION USE: ICD-10-CM

## 2025-04-04 DIAGNOSIS — M15.0 PRIMARY OSTEOARTHRITIS INVOLVING MULTIPLE JOINTS: ICD-10-CM

## 2025-04-04 LAB
ALBUMIN SERPL BCG-MCNC: 4.2 G/DL (ref 3.5–5)
ALP SERPL-CCNC: 61 U/L (ref 34–104)
ALT SERPL W P-5'-P-CCNC: 16 U/L (ref 7–52)
ANION GAP SERPL CALCULATED.3IONS-SCNC: 8 MMOL/L (ref 4–13)
AST SERPL W P-5'-P-CCNC: 16 U/L (ref 13–39)
BASOPHILS # BLD AUTO: 0.06 THOUSANDS/ÂΜL (ref 0–0.1)
BASOPHILS NFR BLD AUTO: 1 % (ref 0–1)
BILIRUB SERPL-MCNC: 0.55 MG/DL (ref 0.2–1)
BUN SERPL-MCNC: 27 MG/DL (ref 5–25)
CALCIUM SERPL-MCNC: 9.6 MG/DL (ref 8.4–10.2)
CHLORIDE SERPL-SCNC: 103 MMOL/L (ref 96–108)
CO2 SERPL-SCNC: 30 MMOL/L (ref 21–32)
CREAT SERPL-MCNC: 0.78 MG/DL (ref 0.6–1.3)
CRP SERPL QL: 4.1 MG/L
EOSINOPHIL # BLD AUTO: 0.17 THOUSAND/ÂΜL (ref 0–0.61)
EOSINOPHIL NFR BLD AUTO: 3 % (ref 0–6)
ERYTHROCYTE [DISTWIDTH] IN BLOOD BY AUTOMATED COUNT: 13.7 % (ref 11.6–15.1)
ERYTHROCYTE [SEDIMENTATION RATE] IN BLOOD: 23 MM/HOUR (ref 0–29)
GFR SERPL CREATININE-BSD FRML MDRD: 77 ML/MIN/1.73SQ M
GLUCOSE P FAST SERPL-MCNC: 94 MG/DL (ref 65–99)
HCT VFR BLD AUTO: 38.6 % (ref 34.8–46.1)
HGB BLD-MCNC: 12.7 G/DL (ref 11.5–15.4)
IMM GRANULOCYTES # BLD AUTO: 0.02 THOUSAND/UL (ref 0–0.2)
IMM GRANULOCYTES NFR BLD AUTO: 0 % (ref 0–2)
LYMPHOCYTES # BLD AUTO: 1.32 THOUSANDS/ÂΜL (ref 0.6–4.47)
LYMPHOCYTES NFR BLD AUTO: 21 % (ref 14–44)
MCH RBC QN AUTO: 29.5 PG (ref 26.8–34.3)
MCHC RBC AUTO-ENTMCNC: 32.9 G/DL (ref 31.4–37.4)
MCV RBC AUTO: 90 FL (ref 82–98)
MONOCYTES # BLD AUTO: 0.59 THOUSAND/ÂΜL (ref 0.17–1.22)
MONOCYTES NFR BLD AUTO: 9 % (ref 4–12)
NEUTROPHILS # BLD AUTO: 4.1 THOUSANDS/ÂΜL (ref 1.85–7.62)
NEUTS SEG NFR BLD AUTO: 66 % (ref 43–75)
NRBC BLD AUTO-RTO: 0 /100 WBCS
PLATELET # BLD AUTO: 341 THOUSANDS/UL (ref 149–390)
PMV BLD AUTO: 10.5 FL (ref 8.9–12.7)
POTASSIUM SERPL-SCNC: 4 MMOL/L (ref 3.5–5.3)
PROT SERPL-MCNC: 7.5 G/DL (ref 6.4–8.4)
RBC # BLD AUTO: 4.3 MILLION/UL (ref 3.81–5.12)
SODIUM SERPL-SCNC: 141 MMOL/L (ref 135–147)
WBC # BLD AUTO: 6.26 THOUSAND/UL (ref 4.31–10.16)

## 2025-04-04 PROCEDURE — 80053 COMPREHEN METABOLIC PANEL: CPT

## 2025-04-04 PROCEDURE — 36415 COLL VENOUS BLD VENIPUNCTURE: CPT

## 2025-04-04 PROCEDURE — 85652 RBC SED RATE AUTOMATED: CPT

## 2025-04-04 PROCEDURE — 86140 C-REACTIVE PROTEIN: CPT

## 2025-04-04 PROCEDURE — 85025 COMPLETE CBC W/AUTO DIFF WBC: CPT

## 2025-04-08 ENCOUNTER — OFFICE VISIT (OUTPATIENT)
Dept: PHYSICAL THERAPY | Facility: CLINIC | Age: 70
End: 2025-04-08
Payer: MEDICARE

## 2025-04-08 DIAGNOSIS — Z96.651 STATUS POST TOTAL RIGHT KNEE REPLACEMENT: ICD-10-CM

## 2025-04-08 DIAGNOSIS — M17.11 PRIMARY OSTEOARTHRITIS OF RIGHT KNEE: Primary | ICD-10-CM

## 2025-04-08 PROCEDURE — 97112 NEUROMUSCULAR REEDUCATION: CPT

## 2025-04-08 PROCEDURE — 97110 THERAPEUTIC EXERCISES: CPT

## 2025-04-08 PROCEDURE — 97530 THERAPEUTIC ACTIVITIES: CPT

## 2025-04-08 NOTE — PROGRESS NOTES
"Daily Note     Today's date: 2025  Patient name: Shiva Shabazz  : 1955  MRN: [id number]  Referring provider: Deep Murdock,*  Dx:   Encounter Diagnosis     ICD-10-CM    1. Primary osteoarthritis of right knee  M17.11       2. Status post total right knee replacement  Z96.651                      Subjective: Pt reports her arthritis has been flaring up but she reports overall improvement since last visit.      Objective: See treatment diary below      Assessment: Demonstrates quad weakness still.  Lateral trunk lean with gait.  Consider additional lateral hip strengthening exercises nv.  Completes charted interventions without exacerbation of sx.  Pt would benefit from continued PT.      Plan: Continue per plan of care.      Precautions: polymyalgia rheumatica,         POC expires Unit limit Auth Expiration date PT/OT/ST + Visit Limit?   25 BOMN 25 BOMN                           Visit/Unit Tracking  AUTH Status:  Date 3/25 3/27  4/3 4/8   3/4 3/6 3/11 3/13 3/18  3/20    Requires 10 visit PN Used 13 14 15 16   7 8 9 10 V PN 11 12    Remaining                    [employer]:[email or web address]  Access Code: [email or web address]      Manuals 3/20  3/25 3/27  4/3 4/8  3/4 3/6 3/11 3/18                                                        Neuro Re-Ed             Quad set       3\"x30 3\"x20  3\"X20  3\"x20    Glute set              SAQ iso       2# 5\"x20      Prone quad set  3\"X20   3\"x20      3\"x20      Biodex LOS  Lv 11 foam x 5  L10 x4 foam L10 x5 foam  L9 foam x 5  L9 foam x5   Static x 4  Lv 12 x 6  Lv 11 floor/foam x 3 ea    TKE  10# 3\"x20 NV          DLS seated PB push- TA    3\"X20           TA    3\"X20  3\"x20        Adductor set     3\"x20  5\"x20        Ther Ex             Pt Edu KS AF KS KS AF   KS KS KS   PROM       AF KS KS    Bike for ROM 10' full  10' full 10' full  10' full  10' full  6' full Rock x5'; full x 3'  Full 7'  10' full    Ankle pumps              Heel slides  " "5\"x20  5\"x30     5\"x20 5\" x20  5\"X20  5\"x20    LAQ 2# 2x10 ea 2# 3x10 ea 2# 2x10 ea  3# 2x10 ea 3# 3x10  2# 3x10  2# 2x10 ea                  Dock kick              Gastroc stretch w srap        30\"x4 4x20\"      HS stretch by PT         4x20\"      Seated knee flexion stretch           10\"X 10     Standing ham curl          2x10 ea     SLR x2 W QS flex 2# 2x10 ea C QS flex 2# 2x10 & abd W QS 2# 2x10     2x10 2x10  2# 2x10 flex  2x10 w QS    Knee hang for ext    2# x2 min          Ther Activity             Lateral step overs         6\" 2x10   6\" x20    Step up    Biodex 2x10 ea  Biodex x30  2x10 6\" 6\" 2x10   6\" 2x10 ea leg    Step taps         Biodex x20      Mini squat  2x10   TRX 2x10  2x10  TRX 2x10    2x10     TG squat          2x10     Leg press  65# x10; 75# x10; 85# x10  85# 3x10 95# 2x10 95# 2x10; 105# x10  95# 3x10        Leg press- uni  45# 2x10  45# 3x10 45# 2x10           Retrogait- Natasha     11# x10  11# x15                     Gait Training                          TUG W cane 13.39 sec             Modalities                                                          "

## 2025-04-10 ENCOUNTER — OFFICE VISIT (OUTPATIENT)
Dept: PHYSICAL THERAPY | Facility: CLINIC | Age: 70
End: 2025-04-10
Payer: MEDICARE

## 2025-04-10 DIAGNOSIS — Z96.651 STATUS POST TOTAL RIGHT KNEE REPLACEMENT: ICD-10-CM

## 2025-04-10 DIAGNOSIS — M17.11 PRIMARY OSTEOARTHRITIS OF RIGHT KNEE: Primary | ICD-10-CM

## 2025-04-10 PROCEDURE — 97110 THERAPEUTIC EXERCISES: CPT

## 2025-04-10 PROCEDURE — 97530 THERAPEUTIC ACTIVITIES: CPT

## 2025-04-10 PROCEDURE — 97112 NEUROMUSCULAR REEDUCATION: CPT

## 2025-04-10 NOTE — PROGRESS NOTES
"Daily Note     Today's date: 4/10/2025  Patient name: Shiva Shabazz  : 1955  MRN: [id number]  Referring provider: Deep Murdock,*  Dx:   Encounter Diagnosis     ICD-10-CM    1. Primary osteoarthritis of right knee  M17.11       2. Status post total right knee replacement  Z96.651                      Subjective: Pt denies complications with R knee.  She reports continued LBP.      Objective: See treatment diary below      Assessment: Decreased eccentric quadriceps control present.  Relies on hand rail to perform step down.  Added glute med strengthening this visit secondary to reports of LBP and lateral trunk lean during gait.  Patient demonstrates lateral trunk lean with this and requires VC to avoid.  Pt would benefit from continued PT.      Plan: Continue per plan of care.      Precautions: polymyalgia rheumatica,         POC expires Unit limit Auth Expiration date PT/OT/ST + Visit Limit?   25 BOMN 25 BOMN                           Visit/Unit Tracking  AUTH Status:  Date 3/25 3/27  4/3 4/8 4/10  3/4 3/6 3/11 3/13 3/18  3/20    Requires 10 visit PN Used 13 14 15 16 17  7 8 9 10 V PN 11 12    Remaining                    [employer]:[email or web address]  Access Code: [email or web address]      Manuals 3/20  3/25 3/27  4/3 4/8 4/10  3/6 3/11 3/18                                                        Neuro Re-Ed             Quad set        3\"x20  3\"X20  3\"x20    Glute set              SAQ iso             Prone quad set  3\"X20   3\"x20      3\"x20      Biodex LOS  Lv 11 foam x 5  L10 x4 foam L10 x5 foam  L9 foam x 5  L9 foam x5 L9 foam x5  Static x 4  Lv 12 x 6  Lv 11 floor/foam x 3 ea    TKE  10# 3\"x20 NV          DLS seated PB push- TA    3\"X20           TA    3\"X20  3\"x20 3\"x20       Adductor set     3\"x20  5\"x20 C bridge 3\" 2x10       Ther Ex             Pt Edu KS AF KS KS AF   KS KS KS   PROM        KS KS    Bike for ROM 10' full  10' full 10' full  10' full  10' full 10' full  Rock " "x5'; full x 3'  Full 7'  10' full    Ankle pumps              Heel slides  5\"x20  5\"x30      5\" x20  5\"X20  5\"x20    LAQ 2# 2x10 ea 2# 3x10 ea 2# 2x10 ea  3# 2x10 ea 3# 3x10 3# 3x10   2# 2x10 ea     Standing hip abd      RTB 2x10       SLR x2 W QS flex 2# 2x10 ea C QS flex 2# 2x10 & abd W QS 2# 2x10      2x10  2# 2x10 flex  2x10 w QS    Ther Activity             Lateral step overs         6\" 2x10   6\" x20    Step up    Biodex 2x10 ea  Biodex x30 Biodex x30  6\" 2x10   6\" 2x10 ea leg    Step taps         Biodex x20      Mini squat  2x10   TRX 2x10  2x10  TRX 2x10 TRX 3x10   2x10     TG squat          2x10     Leg press  65# x10; 75# x10; 85# x10  85# 3x10 95# 2x10 95# 2x10; 105# x10  95# 3x10 105# 3x10       Leg press- uni  45# 2x10  45# 3x10 45# 2x10           Retrogait- Filion     11# x10  11# x15 11# x15       Step down      6\" x10       Gait Training                          TUG W cane 13.39 sec             Modalities                                                            "

## 2025-04-14 ENCOUNTER — OFFICE VISIT (OUTPATIENT)
Dept: FAMILY MEDICINE CLINIC | Facility: CLINIC | Age: 70
End: 2025-04-14
Payer: MEDICARE

## 2025-04-14 VITALS
OXYGEN SATURATION: 97 % | HEIGHT: 66 IN | HEART RATE: 67 BPM | SYSTOLIC BLOOD PRESSURE: 146 MMHG | BODY MASS INDEX: 28.77 KG/M2 | WEIGHT: 179 LBS | DIASTOLIC BLOOD PRESSURE: 78 MMHG | TEMPERATURE: 99.3 F

## 2025-04-14 DIAGNOSIS — R68.83 CHILLS: ICD-10-CM

## 2025-04-14 DIAGNOSIS — R52 GENERALIZED BODY ACHES: ICD-10-CM

## 2025-04-14 DIAGNOSIS — Z00.00 MEDICARE ANNUAL WELLNESS VISIT, SUBSEQUENT: Primary | ICD-10-CM

## 2025-04-14 DIAGNOSIS — R09.81 NASAL CONGESTION: ICD-10-CM

## 2025-04-14 DIAGNOSIS — I10 ESSENTIAL HYPERTENSION: ICD-10-CM

## 2025-04-14 DIAGNOSIS — R05.9 COUGH, UNSPECIFIED TYPE: ICD-10-CM

## 2025-04-14 DIAGNOSIS — J02.9 SORE THROAT: ICD-10-CM

## 2025-04-14 DIAGNOSIS — R09.89 CHEST CONGESTION: ICD-10-CM

## 2025-04-14 DIAGNOSIS — R51.9 NONINTRACTABLE HEADACHE, UNSPECIFIED CHRONICITY PATTERN, UNSPECIFIED HEADACHE TYPE: ICD-10-CM

## 2025-04-14 LAB
SARS-COV-2 AG UPPER RESP QL IA: NEGATIVE
SL AMB POCT RAPID FLU A: NEGATIVE
SL AMB POCT RAPID FLU B: NEGATIVE
VALID CONTROL: NORMAL

## 2025-04-14 PROCEDURE — G0439 PPPS, SUBSEQ VISIT: HCPCS | Performed by: NURSE PRACTITIONER

## 2025-04-14 PROCEDURE — G2211 COMPLEX E/M VISIT ADD ON: HCPCS | Performed by: NURSE PRACTITIONER

## 2025-04-14 PROCEDURE — 87811 SARS-COV-2 COVID19 W/OPTIC: CPT | Performed by: NURSE PRACTITIONER

## 2025-04-14 PROCEDURE — 87804 INFLUENZA ASSAY W/OPTIC: CPT | Performed by: NURSE PRACTITIONER

## 2025-04-14 PROCEDURE — 99213 OFFICE O/P EST LOW 20 MIN: CPT | Performed by: NURSE PRACTITIONER

## 2025-04-14 RX ORDER — OLMESARTAN MEDOXOMIL 40 MG/1
40 TABLET ORAL DAILY
Qty: 90 TABLET | Refills: 3 | Status: SHIPPED | OUTPATIENT
Start: 2025-04-14

## 2025-04-14 RX ORDER — HYDROCHLOROTHIAZIDE 25 MG/1
25 TABLET ORAL DAILY
Qty: 90 TABLET | Refills: 3 | Status: SHIPPED | OUTPATIENT
Start: 2025-04-14

## 2025-04-14 RX ORDER — METHYLPREDNISOLONE 4 MG/1
TABLET ORAL
Qty: 21 EACH | Refills: 0 | Status: SHIPPED | OUTPATIENT
Start: 2025-04-14

## 2025-04-14 NOTE — PROGRESS NOTES
Discussed lid hygiene, warm compress and eyelid wash. Name: Shiva Shabazz      : 1955      MRN: [id number]  Encounter Provider: VALARIE Howe  Encounter Date: 2025   Encounter department: [facility]  :  Assessment & Plan  Sore throat    Orders:    POCT Rapid Covid Ag    POCT rapid flu A and B    Nasal congestion    Orders:    POCT Rapid Covid Ag    POCT rapid flu A and B    methylPREDNISolone 4 MG tablet therapy pack; Use as directed on package    Cough, unspecified type    Orders:    POCT Rapid Covid Ag    POCT rapid flu A and B    methylPREDNISolone 4 MG tablet therapy pack; Use as directed on package    Chest congestion    Orders:    POCT Rapid Covid Ag    POCT rapid flu A and B    methylPREDNISolone 4 MG tablet therapy pack; Use as directed on package  Patient IH COVID and Flu were negative and discussed a viral infection supportive treatments advised and is having some wheezing will give a medrol dose pack and call if new or worsening symptoms   Chills    Orders:    POCT Rapid Covid Ag    POCT rapid flu A and B    Generalized body aches    Orders:    POCT Rapid Covid Ag    POCT rapid flu A and B    Nonintractable headache, unspecified chronicity pattern, unspecified headache type    Orders:    POCT Rapid Covid Ag    POCT rapid flu A and B    Medicare annual wellness visit, subsequent         Essential hypertension    Orders:    olmesartan (BENICAR) 40 mg tablet; Take 1 tablet (40 mg total) by mouth daily    hydroCHLOROthiazide 25 mg tablet; Take 1 tablet (25 mg total) by mouth daily  refill provided for the BP medications      Preventive health issues were discussed with patient, and age appropriate screening tests were ordered as noted in patient's After Visit Summary. Personalized health advice and appropriate referrals for health education or preventive services given if needed, as noted in patient's After Visit Summary.    History of Present Illness     Patient here today for her check up and medicare  wellness. Patient recently had her right knee replacement and is doing well had her knee replacement on 2/10. Patient is planning to have her left knee replacement for the fall and plans to see Dr. Murdock in the fall. Patient reports that she is having dry throat and sinus congestion and throat is dry since 3 days ago and is taking some home remedies and mild helpful. No sick contacts.     Sore Throat   Associated symptoms include congestion and headaches. Pertinent negatives include no abdominal pain, coughing, diarrhea, ear pain, shortness of breath or vomiting.   Cough  Associated symptoms include a fever, headaches, postnasal drip, rhinorrhea and a sore throat. Pertinent negatives include no chest pain, chills, ear pain, eye redness or shortness of breath.   Headache  Generalized Body Aches  Associated symptoms include congestion, headaches, rhinorrhea, a sore throat, fatigue and a fever. Pertinent negatives include no ear pain, eye pain, eye redness, hearing loss, chest pain, coughing, shortness of breath, abdominal pain, diarrhea, nausea or vomiting.      Patient Care Team:  VALARIE Howe as PCP - General (Family Medicine)  Maricel Arnold MD (Obstetrics and Gynecology)  April Farrar RN as Nurse Navigator (Orthopedics)    Review of Systems   Constitutional:  Positive for fatigue and fever. Negative for activity change, appetite change, chills, diaphoresis and unexpected weight change.   HENT:  Positive for congestion, postnasal drip, rhinorrhea and sore throat. Negative for ear pain, hearing loss, sinus pressure, sinus pain and sneezing.    Eyes:  Negative for pain, redness and visual disturbance.   Respiratory:  Positive for chest tightness. Negative for cough and shortness of breath.    Cardiovascular:  Negative for chest pain and leg swelling.   Gastrointestinal:  Negative for abdominal pain, diarrhea, nausea and vomiting.   Endocrine: Negative.    Genitourinary: Negative.     Musculoskeletal:  Positive for gait problem. Negative for arthralgias.   Skin: Negative.    Allergic/Immunologic: Negative.    Neurological:  Positive for headaches. Negative for dizziness and light-headedness.   Hematological: Negative.    Psychiatric/Behavioral:  Negative for behavioral problems and dysphoric mood.      Medical History Reviewed by provider this encounter:       Annual Wellness Visit Questionnaire   Shiva is here for her Subsequent Wellness visit. Last Medicare Wellness visit information reviewed, patient interviewed and updates made to the record today.      Health Risk Assessment:   Patient rates overall health as good. Patient feels that their physical health rating is slightly worse. Patient is very satisfied with their life. Eyesight was rated as same. Hearing was rated as same. Patient feels that their emotional and mental health rating is same. Patients states they are never, rarely angry. Patient states they are sometimes unusually tired/fatigued. Pain experienced in the last 7 days has been some. Patient's pain rating has been 10/10. Patient states that she has experienced no weight loss or gain in last 6 months. Lower back pain in the morning     Depression Screening:   PHQ-2 Score: 0      Fall Risk Screening:   In the past year, patient has experienced: no history of falling in past year      Urinary Incontinence Screening:   Patient has leaked urine accidently in the last six months.     Home Safety:  Patient has trouble with stairs inside or outside of their home. Patient has working smoke alarms and has working carbon monoxide detector. Home safety hazards include: none.     Nutrition:   Current diet is Regular.     Medications:   Patient is not currently taking any over-the-counter supplements. Patient is able to manage medications.     Activities of Daily Living (ADLs)/Instrumental Activities of Daily Living (IADLs):   Walk and transfer into and out of bed and chair?: Yes  Dress  and groom yourself?: Yes    Bathe or shower yourself?: Yes    Feed yourself? Yes  Do your laundry/housekeeping?: Yes  Manage your money, pay your bills and track your expenses?: Yes  Make your own meals?: Yes    Do your own shopping?: Yes    Previous Hospitalizations:   Any hospitalizations or ED visits within the last 12 months?: Yes    How many hospitalizations have you had in the last year?: 1-2    Advance Care Planning:   Living will: No    Durable POA for healthcare: No    Advanced directive: No    Advanced directive counseling given: Yes    ACP document given: Yes    End of Life Decisions reviewed with patient: Yes    Provider agrees with end of life decisions: Yes      Cognitive Screening:   Provider or family/friend/caregiver concerned regarding cognition?: No    Preventive Screenings      Cardiovascular Screening:    General: Screening Not Indicated and History Lipid Disorder      Diabetes Screening:     General: Screening Current      Colorectal Cancer Screening:     General: Screening Current      Breast Cancer Screening:     General: Screening Current      Cervical Cancer Screening:    General: Screening Not Indicated      Osteoporosis Screening:    General: Screening Current      Abdominal Aortic Aneurysm (AAA) Screening:        General: Risks and Benefits Discussed and Screening Not Indicated      Lung Cancer Screening:     General: Screening Not Indicated      Hepatitis C Screening:    General: Screening Current    Immunizations:  - Immunizations due: Zoster (Shingrix)    Screening, Brief Intervention, and Referral to Treatment (SBIRT)     Screening  Typical number of drinks in a day: 0    Single Item Drug Screening:  How often have you used an illegal drug (including marijuana) or a prescription medication for non-medical reasons in the past year? never    Single Item Drug Screen Score: 0  Interpretation: Negative screen for possible drug use disorder    Social Drivers of Health     Financial  "Resource Strain: Patient Declined (2/23/2024)    Overall Financial Resource Strain (CARDIA)     Difficulty of Paying Living Expenses: Patient declined   Food Insecurity: Patient Declined (4/14/2025)    Hunger Vital Sign     Worried About Running Out of Food in the Last Year: Patient declined     Ran Out of Food in the Last Year: Patient declined   Transportation Needs: Patient Declined (4/14/2025)    PRAPARE - Transportation     Lack of Transportation (Medical): Patient declined     Lack of Transportation (Non-Medical): Patient declined   Housing Stability: Patient Declined (4/14/2025)    Housing Stability Vital Sign     Unable to Pay for Housing in the Last Year: Patient declined     Number of Times Moved in the Last Year: 1     Homeless in the Last Year: Patient declined   Utilities: Patient Declined (4/14/2025)    [facility] Utilities     Threatened with loss of utilities: Patient declined     No results found.    Objective   /78 (BP Location: Left arm, Patient Position: Sitting, Cuff Size: Large)   Pulse 67   Temp 99.3 °F (37.4 °C)   Ht 5' 6\" (1.676 m)   Wt 81.2 kg (179 lb)   LMP  (LMP Unknown)   SpO2 97%   BMI 28.89 kg/m²     Physical Exam    "

## 2025-04-14 NOTE — ASSESSMENT & PLAN NOTE
Orders:    olmesartan (BENICAR) 40 mg tablet; Take 1 tablet (40 mg total) by mouth daily    hydroCHLOROthiazide 25 mg tablet; Take 1 tablet (25 mg total) by mouth daily  refill provided for the BP medications

## 2025-04-14 NOTE — ASSESSMENT & PLAN NOTE
Orders:    POCT Rapid Covid Ag    POCT rapid flu A and B    methylPREDNISolone 4 MG tablet therapy pack; Use as directed on package

## 2025-04-14 NOTE — PATIENT INSTRUCTIONS
Medicare Preventive Visit Patient Instructions  Thank you for completing your Welcome to Medicare Visit or Medicare Annual Wellness Visit today. Your next wellness visit will be due in one year (4/15/2026).  The screening/preventive services that you may require over the next 5-10 years are detailed below. Some tests may not apply to you based off risk factors and/or age. Screening tests ordered at today's visit but not completed yet may show as past due. Also, please note that scanned in results may not display below.  Preventive Screenings:  Service Recommendations Previous Testing/Comments   Colorectal Cancer Screening  * Colonoscopy    * Fecal Occult Blood Test (FOBT)/Fecal Immunochemical Test (FIT)  * Fecal DNA/Cologuard Test  * Flexible Sigmoidoscopy Age: 45-75 years old   Colonoscopy: every 10 years (may be performed more frequently if at higher risk)  OR  FOBT/FIT: every 1 year  OR  Cologuard: every 3 years  OR  Sigmoidoscopy: every 5 years  Screening may be recommended earlier than age 45 if at higher risk for colorectal cancer. Also, an individualized decision between you and your healthcare provider will decide whether screening between the ages of 76-85 would be appropriate. Colonoscopy: 02/15/2023  FOBT/FIT: Not on file  Cologuard: Not on file  Sigmoidoscopy: Not on file    Screening Current     Breast Cancer Screening Age: 40+ years old  Frequency: every 1-2 years  Not required if history of left and right mastectomy Mammogram: 09/23/2024    Screening Current   Cervical Cancer Screening Between the ages of 21-29, pap smear recommended once every 3 years.   Between the ages of 30-65, can perform pap smear with HPV co-testing every 5 years.   Recommendations may differ for women with a history of total hysterectomy, cervical cancer, or abnormal pap smears in past. Pap Smear: 07/10/2023    Screening Not Indicated   Hepatitis C Screening Once for adults born between 1945 and 1965  More frequently in  patients at high risk for Hepatitis C Hep C Antibody: 01/03/2020    Screening Current   Diabetes Screening 1-2 times per year if you're at risk for diabetes or have pre-diabetes Fasting glucose: 94 mg/dL (4/4/2025)  A1C: 5.2 % (1/17/2025)  Screening Current   Cholesterol Screening Once every 5 years if you don't have a lipid disorder. May order more often based on risk factors. Lipid panel: 07/18/2024    Screening Not Indicated  History Lipid Disorder     Other Preventive Screenings Covered by Medicare:  Abdominal Aortic Aneurysm (AAA) Screening: covered once if your at risk. You're considered to be at risk if you have a family history of AAA.  Lung Cancer Screening: covers low dose CT scan once per year if you meet all of the following conditions: (1) Age 55-77; (2) No signs or symptoms of lung cancer; (3) Current smoker or have quit smoking within the last 15 years; (4) You have a tobacco smoking history of at least 20 pack years (packs per day multiplied by number of years you smoked); (5) You get a written order from a healthcare provider.  Glaucoma Screening: covered annually if you're considered high risk: (1) You have diabetes OR (2) Family history of glaucoma OR (3)  aged 50 and older OR (4)  American aged 65 and older  Osteoporosis Screening: covered every 2 years if you meet one of the following conditions: (1) You're estrogen deficient and at risk for osteoporosis based off medical history and other findings; (2) Have a vertebral abnormality; (3) On glucocorticoid therapy for more than 3 months; (4) Have primary hyperparathyroidism; (5) On osteoporosis medications and need to assess response to drug therapy.   Last bone density test (DXA Scan): 03/26/2025.  HIV Screening: covered annually if you're between the age of 15-65. Also covered annually if you are younger than 15 and older than 65 with risk factors for HIV infection. For pregnant patients, it is covered up to 3 times per  pregnancy.    Immunizations:  Immunization Recommendations   Influenza Vaccine Annual influenza vaccination during flu season is recommended for all persons aged >= 6 months who do not have contraindications   Pneumococcal Vaccine   * Pneumococcal conjugate vaccine = PCV13 (Prevnar 13), PCV15 (Vaxneuvance), PCV20 (Prevnar 20)  * Pneumococcal polysaccharide vaccine = PPSV23 (Pneumovax) Adults 19-65 yo with certain risk factors or if 65+ yo  If never received any pneumonia vaccine: recommend Prevnar 20 (PCV20)  Give PCV20 if previously received 1 dose of PCV13 or PPSV23   Hepatitis B Vaccine 3 dose series if at intermediate or high risk (ex: diabetes, end stage renal disease, liver disease)   Respiratory syncytial virus (RSV) Vaccine - COVERED BY MEDICARE PART D  * RSVPreF3 (Arexvy) CDC recommends that adults 60 years of age and older may receive a single dose of RSV vaccine using shared clinical decision-making (SCDM)   Tetanus (Td) Vaccine - COST NOT COVERED BY MEDICARE PART B Following completion of primary series, a booster dose should be given every 10 years to maintain immunity against tetanus. Td may also be given as tetanus wound prophylaxis.   Tdap Vaccine - COST NOT COVERED BY MEDICARE PART B Recommended at least once for all adults. For pregnant patients, recommended with each pregnancy.   Shingles Vaccine (Shingrix) - COST NOT COVERED BY MEDICARE PART B  2 shot series recommended in those 19 years and older who have or will have weakened immune systems or those 50 years and older     Health Maintenance Due:      Topic Date Due   • Breast Cancer Screening: Mammogram  09/23/2025   • DXA SCAN  03/26/2027   • Colorectal Cancer Screening  02/14/2028   • Hepatitis C Screening  Completed     Immunizations Due:      Topic Date Due   • COVID-19 Vaccine (3 - Pfizer risk series) 07/02/2021     Advance Directives   What are advance directives?  Advance directives are legal documents that state your wishes and plans  for medical care. These plans are made ahead of time in case you lose your ability to make decisions for yourself. Advance directives can apply to any medical decision, such as the treatments you want, and if you want to donate organs.   What are the types of advance directives?  There are many types of advance directives, and each state has rules about how to use them. You may choose a combination of any of the following:  Living will:  This is a written record of the treatment you want. You can also choose which treatments you do not want, which to limit, and which to stop at a certain time. This includes surgery, medicine, IV fluid, and tube feedings.   Durable power of  for healthcare (DPAHC):  This is a written record that states who you want to make healthcare choices for you when you are unable to make them for yourself. This person, called a proxy, is usually a family member or a friend. You may choose more than 1 proxy.  Do not resuscitate (DNR) order:  A DNR order is used in case your heart stops beating or you stop breathing. It is a request not to have certain forms of treatment, such as CPR. A DNR order may be included in other types of advance directives.  Medical directive:  This covers the care that you want if you are in a coma, near death, or unable to make decisions for yourself. You can list the treatments you want for each condition. Treatment may include pain medicine, surgery, blood transfusions, dialysis, IV or tube feedings, and a ventilator (breathing machine).  Values history:  This document has questions about your views, beliefs, and how you feel and think about life. This information can help others choose the care that you would choose.  Why are advance directives important?  An advance directive helps you control your care. Although spoken wishes may be used, it is better to have your wishes written down. Spoken wishes can be misunderstood, or not followed. Treatments may be  given even if you do not want them. An advance directive may make it easier for your family to make difficult choices about your care.   Urinary Incontinence   Urinary incontinence (UI)  is when you lose control of your bladder. UI develops because your bladder cannot store or empty urine properly. The 3 most common types of UI are stress incontinence, urge incontinence, or both.  Medicines:   May be given to help strengthen your bladder control. Report any side effects of medication to your healthcare provider.  Do pelvic muscle exercises often:  Your pelvic muscles help you stop urinating. Squeeze these muscles tight for 5 seconds, then relax for 5 seconds. Gradually work up to squeezing for 10 seconds. Do 3 sets of 15 repetitions a day, or as directed. This will help strengthen your pelvic muscles and improve bladder control.  Train your bladder:  Go to the bathroom at set times, such as every 2 hours, even if you do not feel the urge to go. You can also try to hold your urine when you feel the urge to go. For example, hold your urine for 5 minutes when you feel the urge to go. As that becomes easier, hold your urine for 10 minutes.   Self-care:   Keep a UI record.  Write down how often you leak urine and how much you leak. Make a note of what you were doing when you leaked urine.  Drink liquids as directed. You may need to limit the amount of liquid you drink to help control your urine leakage. Do not drink any liquid right before you go to bed. Limit or do not have drinks that contain caffeine or alcohol.   Prevent constipation.  Eat a variety of high-fiber foods. Good examples are high-fiber cereals, beans, vegetables, and whole-grain breads. Walking is the best way to trigger your intestines to have a bowel movement.  Exercise regularly and maintain a healthy weight.  Weight loss and exercise will decrease pressure on your bladder and help you control your leakage.   Use a catheter as directed  to help empty  your bladder. A catheter is a tiny, plastic tube that is put into your bladder to drain your urine.   Go to behavior therapy as directed.  Behavior therapy may be used to help you learn to control your urge to urinate.    Weight Management   Why it is important to manage your weight:  Being overweight increases your risk of health conditions such as heart disease, high blood pressure, type 2 diabetes, and certain types of cancer. It can also increase your risk for osteoarthritis, sleep apnea, and other respiratory problems. Aim for a slow, steady weight loss. Even a small amount of weight loss can lower your risk of health problems.  How to lose weight safely:  A safe and healthy way to lose weight is to eat fewer calories and get regular exercise. You can lose up about 1 pound a week by decreasing the number of calories you eat by 500 calories each day.   Healthy meal plan for weight management:  A healthy meal plan includes a variety of foods, contains fewer calories, and helps you stay healthy. A healthy meal plan includes the following:  Eat whole-grain foods more often.  A healthy meal plan should contain fiber. Fiber is the part of grains, fruits, and vegetables that is not broken down by your body. Whole-grain foods are healthy and provide extra fiber in your diet. Some examples of whole-grain foods are whole-wheat breads and pastas, oatmeal, brown rice, and bulgur.  Eat a variety of vegetables every day.  Include dark, leafy greens such as spinach, kale, bethany greens, and mustard greens. Eat yellow and orange vegetables such as carrots, sweet potatoes, and winter squash.   Eat a variety of fruits every day.  Choose fresh or canned fruit (canned in its own juice or light syrup) instead of juice. Fruit juice has very little or no fiber.  Eat low-fat dairy foods.  Drink fat-free (skim) milk or 1% milk. Eat fat-free yogurt and low-fat cottage cheese. Try low-fat cheeses such as mozzarella and other reduced-fat  cheeses.  Choose meat and other protein foods that are low in fat.  Choose beans or other legumes such as split peas or lentils. Choose fish, skinless poultry (chicken or turkey), or lean cuts of red meat (beef or pork). Before you cook meat or poultry, cut off any visible fat.   Use less fat and oil.  Try baking foods instead of frying them. Add less fat, such as margarine, sour cream, regular salad dressing and mayonnaise to foods. Eat fewer high-fat foods. Some examples of high-fat foods include french fries, doughnuts, ice cream, and cakes.  Eat fewer sweets.  Limit foods and drinks that are high in sugar. This includes candy, cookies, regular soda, and sweetened drinks.  Exercise:  Exercise at least 30 minutes per day on most days of the week. Some examples of exercise include walking, biking, dancing, and swimming. You can also fit in more physical activity by taking the stairs instead of the elevator or parking farther away from stores. Ask your healthcare provider about the best exercise plan for you.      © Copyright [employer] 2018 Information is for End User's use only and may not be sold, redistributed or otherwise used for commercial purposes. All illustrations and images included in CareNotes® are the copyrighted property of A.[employer].A.M., Inc. or [employer]

## 2025-04-14 NOTE — ASSESSMENT & PLAN NOTE
Orders:    POCT Rapid Covid Ag    POCT rapid flu A and B    methylPREDNISolone 4 MG tablet therapy pack; Use as directed on package  Patient IH COVID and Flu were negative and discussed a viral infection supportive treatments advised and is having some wheezing will give a medrol dose pack and call if new or worsening symptoms

## 2025-04-15 ENCOUNTER — OFFICE VISIT (OUTPATIENT)
Dept: PHYSICAL THERAPY | Facility: CLINIC | Age: 70
End: 2025-04-15
Payer: MEDICARE

## 2025-04-15 DIAGNOSIS — M17.11 PRIMARY OSTEOARTHRITIS OF RIGHT KNEE: Primary | ICD-10-CM

## 2025-04-15 DIAGNOSIS — Z96.651 STATUS POST TOTAL RIGHT KNEE REPLACEMENT: ICD-10-CM

## 2025-04-15 PROCEDURE — 97110 THERAPEUTIC EXERCISES: CPT | Performed by: PHYSICAL THERAPIST

## 2025-04-15 PROCEDURE — 97530 THERAPEUTIC ACTIVITIES: CPT | Performed by: PHYSICAL THERAPIST

## 2025-04-15 PROCEDURE — 97112 NEUROMUSCULAR REEDUCATION: CPT | Performed by: PHYSICAL THERAPIST

## 2025-04-15 NOTE — PROGRESS NOTES
"Daily Note     Today's date: 4/15/2025  Patient name: Shiva Shabazz  : 1955  MRN: [id number]  Referring provider: Deep Murdock,*  Dx:   Encounter Diagnosis     ICD-10-CM    1. Primary osteoarthritis of right knee  M17.11       2. Status post total right knee replacement  Z96.651                      Subjective: Pt states her low back is getting a little better.       Objective: See treatment diary below      Assessment: Patient remains with glute med weakness and Trendelenburg.  She has no complaints of pain during or post session.  She achieves 0 degrees of knee extension and 116 degrees of knee flexion (without heel slides).  Overall, she is doing well, but will benefit from progression as tolerated.       Plan: Continue per plan of care.      Precautions: polymyalgia rheumatica,         POC expires Unit limit Auth Expiration date PT/OT/ST + Visit Limit?   25 BOMN 25 BOMN                           Visit/Unit Tracking  AUTH Status:  Date 3/25 3/27  4/3 4/8 4/10 4/15     3/13 3/18  3/20    Requires 10 visit PN Used 13 14 15 16 17 18    10 V PN 11 12    Remaining                    [employer]:[email or web address]  Access Code: [email or web address]      Manuals 3/20  3/25 3/27  4/3 4/8 4/10 4/15   3/11 3/18                                                        Neuro Re-Ed             Quad set         3\"X20  3\"x20    Glute set              SAQ iso             Prone quad set  3\"X20   3\"x20           Biodex LOS  Lv 11 foam x 5  L10 x4 foam L10 x5 foam  L9 foam x 5  L9 foam x5 L9 foam x5 L9 foam x 5   Lv 12 x 6  Lv 11 floor/foam x 3 ea    TKE  10# 3\"x20 NV          DLS seated PB push- TA    3\"X20           TA    3\"X20  3\"x20 3\"x20 Home       Adductor set     3\"x20  5\"x20 C bridge 3\" 2x10 Home       Ther Ex             Pt Edu KS AF KS KS AF  KS  KS KS   PROM         KS    Bike for ROM 10' full  10' full 10' full  10' full  10' full 10' full 10' full   Full 7'  10' full    Ankle pumps       " "       Heel slides  5\"x20  5\"x30       5\"X20  5\"x20    LAQ 2# 2x10 ea 2# 3x10 ea 2# 2x10 ea  3# 2x10 ea 3# 3x10 3# 3x10 3# 3x10 ea  2# 2x10 ea     Standing hip abd      RTB 2x10       SLR x2 W QS flex 2# 2x10 ea C QS flex 2# 2x10 & abd W QS 2# 2x10     3# 3x10 ea  2# 2x10 flex  2x10 w QS    Manual Stretch- HS, Piri        RLE KS       Ther Activity             Lateral step overs           6\" x20    Step up    Biodex 2x10 ea  Biodex x30 Biodex x30 Biodex 2x10 ea    6\" 2x10 ea leg    Step taps              Mini squat  2x10   TRX 2x10  2x10  TRX 2x10 TRX 3x10   2x10     TG squat          2x10     Leg press  65# x10; 75# x10; 85# x10  85# 3x10 95# 2x10 95# 2x10; 105# x10  95# 3x10 105# 3x10 105# 3x10       Leg press- uni  45# 2x10  45# 3x10 45# 2x10     45# 3x10       Retrogait- [place]     11# x10  11# x15 11# x15 15# x10       Side step- [place]        9# x10 ea       Step down      6\" x10                                 Gait Training                          TUG W cane 13.39 sec             Modalities                                                            "

## 2025-04-17 ENCOUNTER — OFFICE VISIT (OUTPATIENT)
Dept: PHYSICAL THERAPY | Facility: CLINIC | Age: 70
End: 2025-04-17
Payer: MEDICARE

## 2025-04-17 DIAGNOSIS — Z96.651 STATUS POST TOTAL RIGHT KNEE REPLACEMENT: ICD-10-CM

## 2025-04-17 DIAGNOSIS — M17.11 PRIMARY OSTEOARTHRITIS OF RIGHT KNEE: Primary | ICD-10-CM

## 2025-04-17 PROCEDURE — 97110 THERAPEUTIC EXERCISES: CPT

## 2025-04-17 PROCEDURE — 97530 THERAPEUTIC ACTIVITIES: CPT

## 2025-04-17 NOTE — PROGRESS NOTES
"Daily Note     Today's date: 2025  Patient name: Shiva Shabazz  : 1955  MRN: [id number]  Referring provider: Deep Murdock,*  Dx:   Encounter Diagnosis     ICD-10-CM    1. Primary osteoarthritis of right knee  M17.11       2. Status post total right knee replacement  Z96.651                      Subjective: Pt reports her knees feel good today.  She states her low back is improving and has transitioned to L side which is when it typically goes away.      Objective: See treatment diary below      Assessment: Presents without SPC.  Significant lateral trunk lean during gait.  Continued glute med strengthening with good tolerance.  She is challenged with lateral step ups and demonstrates quad dominance to perform with reliance on hand rail.  Pt would benefit from continued PT.      Plan: Continue per plan of care.      Precautions: polymyalgia rheumatica,         POC expires Unit limit Auth Expiration date PT/OT/ST + Visit Limit?   25 BOMN 25 BOMN                           Visit/Unit Tracking  AUTH Status:  Date 3/25 3/27  4/3 4/8 4/10 4/15  4/17   3/13 3/18  3/20    Requires 10 visit PN Used 13 14 15 16 17 18 19   10 V PN 11 12    Remaining                    [employer]:[email or web address]  Access Code: [email or web address]      Manuals 3/20  3/25 3/27  4/3 4/8 4/10 4/15  4/17  3/18                                                        Neuro Re-Ed             Quad set          3\"x20    Glute set              SAQ iso             Prone quad set  3\"X20   3\"x20           Biodex LOS  Lv 11 foam x 5  L10 x4 foam L10 x5 foam  L9 foam x 5  L9 foam x5 L9 foam x5 L9 foam x 5  L9 foam x5  Lv 11 floor/foam x 3 ea    TKE  10# 3\"x20 NV          DLS seated PB push- TA    3\"X20           TA    3\"X20  3\"x20 3\"x20 Home       Adductor set     3\"x20  5\"x20 C bridge 3\" 2x10 Home       Ther Ex             Pt Edu KS AF KS KS AF  KS   KS   PROM             Bike for ROM 10' full  10' full 10' full  10' " "full  10' full 10' full 10' full  10' full  10' full    Ankle pumps              Heel slides  5\"x20  5\"x30        5\"x20    LAQ 2# 2x10 ea 2# 3x10 ea 2# 2x10 ea  3# 2x10 ea 3# 3x10 3# 3x10 3# 3x10 ea 3# 3x10     Standing hip abd      RTB 2x10       SLR x2 W QS flex 2# 2x10 ea C QS flex 2# 2x10 & abd W QS 2# 2x10     3# 3x10 ea 3# 3x10 ea  2x10 w QS    Manual Stretch- HS, Piri        RLE KS       Ther Activity             Lateral step overs           6\" x20    Step up    Biodex 2x10 ea  Biodex x30 Biodex x30 Biodex 2x10 ea  Biodex lateral 3x10  6\" 2x10 ea leg    Step taps              Mini squat  2x10   TRX 2x10  2x10  TRX 2x10 TRX 3x10       TG squat              Leg press  65# x10; 75# x10; 85# x10  85# 3x10 95# 2x10 95# 2x10; 105# x10  95# 3x10 105# 3x10 105# 3x10  105# 3x10     Leg press- uni  45# 2x10  45# 3x10 45# 2x10     45# 3x10  45# 3x10     Retrogait- [place]     11# x10  11# x15 11# x15 15# x10  15# x10     Side step- [place]        9# x10 ea  9# x10     Step down      6\" x10                                 Gait Training                          TUG W cane 13.39 sec             Modalities                                                              "

## 2025-04-22 ENCOUNTER — OFFICE VISIT (OUTPATIENT)
Dept: PHYSICAL THERAPY | Facility: CLINIC | Age: 70
End: 2025-04-22
Payer: MEDICARE

## 2025-04-22 DIAGNOSIS — M17.11 PRIMARY OSTEOARTHRITIS OF RIGHT KNEE: Primary | ICD-10-CM

## 2025-04-22 DIAGNOSIS — Z96.651 STATUS POST TOTAL RIGHT KNEE REPLACEMENT: ICD-10-CM

## 2025-04-22 PROCEDURE — 97530 THERAPEUTIC ACTIVITIES: CPT

## 2025-04-22 PROCEDURE — 97110 THERAPEUTIC EXERCISES: CPT

## 2025-04-22 NOTE — PROGRESS NOTES
"Daily Note     Today's date: 2025  Patient name: Shiva Shabazz  : 1955  MRN: [id number]  Referring provider: Deep Murdock,*  Dx:   Encounter Diagnosis     ICD-10-CM    1. Primary osteoarthritis of right knee  M17.11       2. Status post total right knee replacement  Z96.651                      Subjective: Pt denies complications with b/l knees.      Objective: See treatment diary below      Assessment: Patient continues to have difficulty with lateral step ups due to glute weakness.  Patient continues to progress well with strengthening.  Pt would benefit from continued PT in order to address hip strength for improved function during daily activities.      Plan: Continue per plan of care.      Precautions: polymyalgia rheumatica,         POC expires Unit limit Auth Expiration date PT/OT/ST + Visit Limit?   25 BOMN 25 BOMN                           Visit/Unit Tracking  AUTH Status:  Date 3/25 3/27  4/3 4/8 4/10 4/15  4/17 4/22  3/13 3/18  3/20    Requires 10 visit PN Used 13 14 15 16 17 18   10 V PN 11 12    Remaining                    [email or web address]  Access Code: [email or web address]      Manuals 3/20  3/25 3/27  4/3 4/8 4/10 4/15  4/17 4/22                                                        Neuro Re-Ed             Quad set             Glute set              SAQ iso             Prone quad set  3\"X20   3\"x20           Biodex LOS  Lv 11 foam x 5  L10 x4 foam L10 x5 foam  L9 foam x 5  L9 foam x5 L9 foam x5 L9 foam x 5  L9 foam x5 L8 foam x5    TKE  10# 3\"x20 NV          DLS seated PB push- TA    3\"X20           TA    3\"X20  3\"x20 3\"x20 Home       Adductor set     3\"x20  5\"x20 C bridge 3\" 2x10 Home       Ther Ex             Pt Edu KS AF KS KS AF  KS      PROM             Bike for ROM 10' full  10' full 10' full  10' full  10' full 10' full 10' full  10' full 10' full    Ankle pumps              Heel slides  5\"x20  5\"x30           LAQ 2# 2x10 ea 2# 3x10 ea " "2# 2x10 ea  3# 2x10 ea 3# 3x10 3# 3x10 3# 3x10 ea 3# 3x10 3# 3x10     Standing hip abd      RTB 2x10       SLR x2 W QS flex 2# 2x10 ea C QS flex 2# 2x10 & abd W QS 2# 2x10     3# 3x10 ea 3# 3x10 ea 3# 3x10 ea    Manual Stretch- HS, Piri        RLE KS       Ther Activity             Lateral step overs              Step up    Biodex 2x10 ea  Biodex x30 Biodex x30 Biodex 2x10 ea  Biodex lateral 3x10 Biodex lat 3x10    Step taps              Mini squat  2x10   TRX 2x10  2x10  TRX 2x10 TRX 3x10       TG squat              Leg press  65# x10; 75# x10; 85# x10  85# 3x10 95# 2x10 95# 2x10; 105# x10  95# 3x10 105# 3x10 105# 3x10  105# 3x10 115# 3x10    Leg press- uni  45# 2x10  45# 3x10 45# 2x10     45# 3x10  45# 3x10 55# 3x10    Retrogait- [place]     11# x10  11# x15 11# x15 15# x10  15# x10 15# x10    Side step- [place]        9# x10 ea  9# x10 9# x10    Step down      6\" x10                                 Gait Training                          TUG W cane 13.39 sec             Modalities                                                                "

## 2025-04-24 ENCOUNTER — OFFICE VISIT (OUTPATIENT)
Dept: PHYSICAL THERAPY | Facility: CLINIC | Age: 70
End: 2025-04-24
Payer: MEDICARE

## 2025-04-24 DIAGNOSIS — Z96.651 STATUS POST TOTAL RIGHT KNEE REPLACEMENT: Primary | ICD-10-CM

## 2025-04-24 PROCEDURE — 97110 THERAPEUTIC EXERCISES: CPT | Performed by: PHYSICAL THERAPIST

## 2025-04-24 PROCEDURE — 97530 THERAPEUTIC ACTIVITIES: CPT | Performed by: PHYSICAL THERAPIST

## 2025-04-24 NOTE — PROGRESS NOTES
"Daily Note     Today's date: 2025  Patient name: Shiva Shabazz  : 1955  MRN: [id number]  Referring provider: Deep Murdock,*  Dx:   Encounter Diagnosis     ICD-10-CM    1. Status post total right knee replacement  Z96.651                      Subjective: Pt reports that her knee is feeling very good - its \"just the back \"   notes discomfort persists in LS region         Objective: See treatment diary below      Assessment: Patient demonstrated better control of lateral myesha walks today   she demonstrates no difficulty with program requests -     Plan: Continue per plan of care.      Precautions: polymyalgia rheumatica,         POC expires Unit limit Auth Expiration date PT/OT/ST + Visit Limit?   25 BOMN 25 BOMN                           Visit/Unit Tracking  AUTH Status:  Date 3/25 3/27  4/3 4/8 4/10 4/15  4/17 4/22 4/24      Requires 10 visit PN Used 13 14 15 16 17 18 19 20 21       Remaining                    [place] [employer]:[email or web address]  Access Code: [email or web address]      Manuals 3/20  3/25 3/27  4/3 4/8 4/10 4/15  4/17 4/22 4/24                                                       Neuro Re-Ed             Quad set             Glute set              SAQ iso             Prone quad set  3\"X20   3\"x20           Biodex LOS  Lv 11 foam x 5  L10 x4 foam L10 x5 foam  L9 foam x 5  L9 foam x5 L9 foam x5 L9 foam x 5  L9 foam x5 L8 foam x5 L8 foam x 5      TKE  10# 3\"x20 NV          DLS seated PB push- TA    3\"X20           TA    3\"X20  3\"x20 3\"x20 Home                    Adductor set     3\"x20  5\"x20 C bridge 3\" 2x10 Home       Ther Ex             Pt Edu KS AF KS KS AF  KS      PROM             Bike for ROM 10' full  10' full 10' full  10' full  10' full 10' full 10' full  10' full 10' full 10' full   Ankle pumps              Heel slides  5\"x20  5\"x30           LAQ 2# 2x10 ea 2# 3x10 ea 2# 2x10 ea  3# 2x10 ea 3# 3x10 3# 3x10 3# 3x10 ea 3# 3x10 3# 3x10  3#  10 x3   Standing hip " "abd      RTB 2x10       SLR x2 W QS flex 2# 2x10 ea C QS flex 2# 2x10 & abd W QS 2# 2x10     3# 3x10 ea 3# 3x10 ea 3# 3x10 ea 3#  10x 3   Manual Stretch- HS, Piri        RLE KS       Ther Activity             Lateral step overs              Step up    Biodex 2x10 ea  Biodex x30 Biodex x30 Biodex 2x10 ea  Biodex lateral 3x10 Biodex lat 3x10 F/L  8\"  10 x 3    Step taps              Mini squat  2x10   TRX 2x10  2x10  TRX 2x10 TRX 3x10       TG squat              Leg press  65# x10; 75# x10; 85# x10  85# 3x10 95# 2x10 95# 2x10; 105# x10  95# 3x10 105# 3x10 105# 3x10  105# 3x10 115# 3x10 115#  10 x 3    Leg press- uni  45# 2x10  45# 3x10 45# 2x10     45# 3x10  45# 3x10 55# 3x10 55#  10 x 3    Retrogait- Natasha     11# x10  11# x15 11# x15 15# x10  15# x10 15# x10 15# x 10    Side step- [place]        9# x10 ea  9# x10 9# x10 9#  x 10 bilat   Step down      6\" x10                                 Gait Training                          TUG W cane 13.39 sec             Modalities                                                                "

## 2025-04-29 ENCOUNTER — OFFICE VISIT (OUTPATIENT)
Dept: PHYSICAL THERAPY | Facility: CLINIC | Age: 70
End: 2025-04-29
Payer: MEDICARE

## 2025-04-29 DIAGNOSIS — M17.11 PRIMARY OSTEOARTHRITIS OF RIGHT KNEE: ICD-10-CM

## 2025-04-29 DIAGNOSIS — Z96.651 STATUS POST TOTAL RIGHT KNEE REPLACEMENT: Primary | ICD-10-CM

## 2025-04-29 PROCEDURE — 97110 THERAPEUTIC EXERCISES: CPT

## 2025-04-29 PROCEDURE — 97530 THERAPEUTIC ACTIVITIES: CPT

## 2025-04-29 NOTE — PROGRESS NOTES
"Daily Note     Today's date: 2025  Patient name: Shiva Shabazz  : 1955  MRN: [id number]  Referring provider: Deep Murdock,*  Dx:   Encounter Diagnosis     ICD-10-CM    1. Status post total right knee replacement  Z96.651       2. Primary osteoarthritis of right knee  M17.11                      Subjective: Pt reports her L knee is more painful than R.  Feels R knee is 100% now.      Objective: See treatment diary below      Assessment: Still some compensation patterns present with lateral step ups, partially due to lack of lumbo pelvic motor control.  Performs charted interventions without complications.       Plan: Continue per plan of care.      Precautions: polymyalgia rheumatica,         POC expires Unit limit Auth Expiration date PT/OT/ST + Visit Limit?   25 BOMN 25 BOMN                           Visit/Unit Tracking  AUTH Status:  Date 3/25 3/27  4/3 4/8 4/10 4/15  4/17 4/22 4/24 4/29     Requires 10 visit PN Used 13 14 15 16 17 18 19 20 21 22      Remaining                    better.:[email or web address]  Access Code: [email or web address]      Manuals 4/29 3/25 3/27  4/3 4/8 4/10 4/15  4/17 4/22 4/24                                                       Neuro Re-Ed             Quad set             Glute set              SAQ iso             Prone quad set    3\"x20           Biodex LOS  L8 foam x5 L10 x4 foam L10 x5 foam  L9 foam x 5  L9 foam x5 L9 foam x5 L9 foam x 5  L9 foam x5 L8 foam x5 L8 foam x 5      TKE  10# 3\"x20 NV          DLS seated PB push- TA    3\"X20           TA    3\"X20  3\"x20 3\"x20 Home                    Adductor set     3\"x20  5\"x20 C bridge 3\" 2x10 Home       Ther Ex             Pt Edu  AF KS KS AF  KS      PROM             Bike for ROM 10' full 10' full 10' full  10' full  10' full 10' full 10' full  10' full 10' full 10' full   Ankle pumps              Heel slides   5\"x30           LAQ 3# 3x10 2# 3x10 ea 2# 2x10 ea  3# 2x10 ea 3# 3x10 3# 3x10 3# " "3x10 ea 3# 3x10 3# 3x10  3#  10 x3   Standing hip abd      RTB 2x10       SLR x2 3# 3x10 C QS flex 2# 2x10 & abd W QS 2# 2x10     3# 3x10 ea 3# 3x10 ea 3# 3x10 ea 3#  10x 3   Manual Stretch- HS, Piri        RLE KS       Ther Activity             Lateral step overs              Step up Biodex lateral 3x10   Biodex 2x10 ea  Biodex x30 Biodex x30 Biodex 2x10 ea  Biodex lateral 3x10 Biodex lat 3x10 F/L  8\"  10 x 3    Step taps              Mini squat    TRX 2x10  2x10  TRX 2x10 TRX 3x10       TG squat              Leg press  115# 3x10 85# 3x10 95# 2x10 95# 2x10; 105# x10  95# 3x10 105# 3x10 105# 3x10  105# 3x10 115# 3x10 115#  10 x 3    Leg press- uni  55# 3x10 45# 3x10 45# 2x10     45# 3x10  45# 3x10 55# 3x10 55#  10 x 3    Retrogait- Natasha     11# x10  11# x15 11# x15 15# x10  15# x10 15# x10 15# x 10                 Side step- [place]        9# x10 ea  9# x10 9# x10 9#  x 10 bilat   Step down      6\" x10                                 Gait Training                          TUG             Modalities                                                                  "

## 2025-05-01 ENCOUNTER — OFFICE VISIT (OUTPATIENT)
Dept: PHYSICAL THERAPY | Facility: CLINIC | Age: 70
End: 2025-05-01
Payer: MEDICARE

## 2025-05-01 DIAGNOSIS — M17.11 PRIMARY OSTEOARTHRITIS OF RIGHT KNEE: ICD-10-CM

## 2025-05-01 DIAGNOSIS — Z96.651 STATUS POST TOTAL RIGHT KNEE REPLACEMENT: Primary | ICD-10-CM

## 2025-05-01 PROCEDURE — 97530 THERAPEUTIC ACTIVITIES: CPT | Performed by: PHYSICAL THERAPIST

## 2025-05-01 PROCEDURE — 97110 THERAPEUTIC EXERCISES: CPT | Performed by: PHYSICAL THERAPIST
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
## 2025-05-02 ENCOUNTER — OFFICE VISIT (OUTPATIENT)
Dept: OBGYN CLINIC | Facility: MEDICAL CENTER | Age: 70
End: 2025-05-02

## 2025-05-02 ENCOUNTER — APPOINTMENT (OUTPATIENT)
Dept: RADIOLOGY | Facility: MEDICAL CENTER | Age: 70
End: 2025-05-02
Attending: ORTHOPAEDIC SURGERY
Payer: MEDICARE

## 2025-05-02 VITALS — WEIGHT: 179.6 LBS | HEIGHT: 66 IN | BODY MASS INDEX: 28.87 KG/M2

## 2025-05-02 DIAGNOSIS — Z94.5 HISTORY OF SKIN GRAFT: ICD-10-CM

## 2025-05-02 DIAGNOSIS — M17.12 PRIMARY OSTEOARTHRITIS OF LEFT KNEE: ICD-10-CM

## 2025-05-02 DIAGNOSIS — Z96.651 STATUS POST TOTAL RIGHT KNEE REPLACEMENT: ICD-10-CM

## 2025-05-02 DIAGNOSIS — Z96.651 STATUS POST TOTAL RIGHT KNEE REPLACEMENT: Primary | ICD-10-CM

## 2025-05-02 PROCEDURE — 73560 X-RAY EXAM OF KNEE 1 OR 2: CPT

## 2025-05-02 PROCEDURE — 99024 POSTOP FOLLOW-UP VISIT: CPT | Performed by: ORTHOPAEDIC SURGERY
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
## 2025-05-14 PROBLEM — Z00.00 MEDICARE ANNUAL WELLNESS VISIT, SUBSEQUENT: Status: RESOLVED | Noted: 2025-04-14 | Resolved: 2025-05-14

## 2025-05-14 PROBLEM — R05.9 COUGH: Status: RESOLVED | Noted: 2025-04-14 | Resolved: 2025-05-14

## 2025-05-21 ENCOUNTER — OFFICE VISIT (OUTPATIENT)
Age: 70
End: 2025-05-21
Payer: MEDICARE

## 2025-05-21 VITALS
DIASTOLIC BLOOD PRESSURE: 88 MMHG | SYSTOLIC BLOOD PRESSURE: 153 MMHG | HEIGHT: 67 IN | WEIGHT: 179.06 LBS | HEART RATE: 60 BPM | TEMPERATURE: 97.9 F | BODY MASS INDEX: 28.1 KG/M2

## 2025-05-21 DIAGNOSIS — M17.12 ARTHRITIS OF LEFT KNEE: Primary | ICD-10-CM

## 2025-05-21 PROCEDURE — 99205 OFFICE O/P NEW HI 60 MIN: CPT | Performed by: STUDENT IN AN ORGANIZED HEALTH CARE EDUCATION/TRAINING PROGRAM

## 2025-05-21 RX ORDER — FOLIC ACID 1 MG/1
TABLET ORAL
COMMUNITY
Start: 2025-04-22

## 2025-05-22 DIAGNOSIS — B35.3 TINEA PEDIS OF BOTH FEET: ICD-10-CM

## 2025-05-22 DIAGNOSIS — G60.9 IDIOPATHIC PERIPHERAL NEUROPATHY: ICD-10-CM

## 2025-05-22 DIAGNOSIS — L24.9 IRRITANT CONTACT DERMATITIS, UNSPECIFIED TRIGGER: ICD-10-CM
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
## 2025-05-23 RX ORDER — GABAPENTIN 600 MG/1
600 TABLET ORAL 2 TIMES DAILY
Qty: 180 TABLET | Refills: 1 | Status: SHIPPED | OUTPATIENT
Start: 2025-05-23

## 2025-05-28 RX ORDER — TRIAMCINOLONE ACETONIDE 1 MG/G
OINTMENT TOPICAL
Qty: 30 G | Refills: 0 | Status: SHIPPED | OUTPATIENT
Start: 2025-05-28

## 2025-05-28 RX ORDER — CICLOPIROX OLAMINE 7.7 MG/G
CREAM TOPICAL
Qty: 30 G | Refills: 1 | Status: SHIPPED | OUTPATIENT
Start: 2025-05-28

## 2025-05-30 ENCOUNTER — TELEPHONE (OUTPATIENT)
Age: 70
End: 2025-05-30

## 2025-05-30 NOTE — TELEPHONE ENCOUNTER
Caller: Patient    Doctor: Dr. Murdock    Reason for call: Scheduled for her left knee in July, would like to know if you would be able to order her PT until her appt.     Call back#: [phone]

## 2025-06-02 DIAGNOSIS — M17.12 PRIMARY OSTEOARTHRITIS OF LEFT KNEE: Primary | ICD-10-CM

## 2025-06-09 ENCOUNTER — EVALUATION (OUTPATIENT)
Dept: PHYSICAL THERAPY | Facility: CLINIC | Age: 70
End: 2025-06-09
Attending: ORTHOPAEDIC SURGERY
Payer: MEDICARE

## 2025-06-09 DIAGNOSIS — G89.29 CHRONIC PAIN OF LEFT KNEE: Primary | ICD-10-CM

## 2025-06-09 DIAGNOSIS — M25.562 CHRONIC PAIN OF LEFT KNEE: Primary | ICD-10-CM

## 2025-06-09 PROCEDURE — 97110 THERAPEUTIC EXERCISES: CPT | Performed by: PHYSICAL THERAPIST

## 2025-06-09 PROCEDURE — 97162 PT EVAL MOD COMPLEX 30 MIN: CPT | Performed by: PHYSICAL THERAPIST
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
## 2025-06-11 ENCOUNTER — OFFICE VISIT (OUTPATIENT)
Dept: PHYSICAL THERAPY | Facility: CLINIC | Age: 70
End: 2025-06-11
Attending: ORTHOPAEDIC SURGERY
Payer: MEDICARE

## 2025-06-11 DIAGNOSIS — M25.562 CHRONIC PAIN OF LEFT KNEE: Primary | ICD-10-CM

## 2025-06-11 DIAGNOSIS — G89.29 CHRONIC PAIN OF LEFT KNEE: Primary | ICD-10-CM

## 2025-06-11 PROCEDURE — 97110 THERAPEUTIC EXERCISES: CPT | Performed by: PHYSICAL THERAPIST

## 2025-06-11 PROCEDURE — 97112 NEUROMUSCULAR REEDUCATION: CPT | Performed by: PHYSICAL THERAPIST

## 2025-06-11 PROCEDURE — 97140 MANUAL THERAPY 1/> REGIONS: CPT | Performed by: PHYSICAL THERAPIST

## 2025-06-11 NOTE — PROGRESS NOTES
"Daily Note     Today's date: 2025  Patient name: Shiva Shabazz  : 1955  MRN: [id number]  Referring provider: Deep Murdock,*  Dx:   Encounter Diagnosis     ICD-10-CM    1. Chronic pain of left knee  M25.562     G89.29                      Subjective: Patient states her knee is okay, but her left groin was bothering her. She states, \"I think I overdid it yesterday.\"      Objective: See treatment diary below      Assessment: Tolerated treatment well. Patient able to perform listed exercises without increased pain. Following exercises, she exhibits improved knee extension ROM.  Good response to knee extension mobs.  Post session, she still has some self reported discomfort, but less than when she started therapy.  Patient would benefit from continued PT      Plan: Continue per plan of care.      Precautions: polymyalgia rheumatica     POC expires Unit limit Auth Expiration date PT/OT/ST + Visit Limit?   25  BOMN N/a BOMN                           Visit/Unit Tracking  AUTH Status:  Date              N/a Used                Remaining                  MEDBRIDGE PROVIDED TO PT.       Manuals            L knee extension mob  KS                                                   Neuro Re-Ed             TKE Grn 3\"x20  Grn 3\"x20            Prone quad set 3\"x20  3\"x20            Quad set  3\"X20  3\"x20            Biodex LOS   Foam Lv 10 x 5            SLB- foam   2x30\" ea                                      Ther Ex                          Pt Edu  KS KS           Recumbent bike for strength  8 min  8 min           Prone quad stretch 5x20\"  By PT            Prone knee hang stretch w towel roll   3 min                                                   Ther Activity                                       Gait Training                                       Modalities                                            "

## 2025-06-17 ENCOUNTER — OFFICE VISIT (OUTPATIENT)
Dept: PHYSICAL THERAPY | Facility: CLINIC | Age: 70
End: 2025-06-17
Attending: ORTHOPAEDIC SURGERY
Payer: MEDICARE

## 2025-06-17 DIAGNOSIS — M25.562 CHRONIC PAIN OF LEFT KNEE: Primary | ICD-10-CM

## 2025-06-17 DIAGNOSIS — G89.29 CHRONIC PAIN OF LEFT KNEE: Primary | ICD-10-CM

## 2025-06-17 PROCEDURE — 97110 THERAPEUTIC EXERCISES: CPT | Performed by: PHYSICAL THERAPIST

## 2025-06-17 PROCEDURE — 97112 NEUROMUSCULAR REEDUCATION: CPT | Performed by: PHYSICAL THERAPIST

## 2025-06-17 NOTE — PROGRESS NOTES
"Daily Note     Today's date: 2025  Patient name: Shiva Shabazz  : 1955  MRN: [id number]  Referring provider: eDep Murdock,*  Dx:   Encounter Diagnosis     ICD-10-CM    1. Chronic pain of left knee  M25.562     G89.29                      Subjective: Patient states she thought she was doing slightly better, but her knee pain was pretty bad this weekend.       Objective: See treatment diary below      Assessment: Tolerated treatment well. Minimal to no reduction in pain from start to finish of session.  She does exhibit improved extension ROM following knee extension mobilization. No complaints of pain post session.  Patient would benefit from continued PT      Plan: Continue per plan of care.      Precautions: polymyalgia rheumatica     POC expires Unit limit Auth Expiration date PT/OT/ST + Visit Limit?   25  BOMN N/a BOMN                           Visit/Unit Tracking  AUTH Status:  Date              N/a Used 1 2 3             Remaining                  MEDBRIDGE PROVIDED TO PT.       Manuals           L knee extension mob  KS  KS                                                 Neuro Re-Ed             TKE Grn 3\"x20  Grn 3\"x20  Grn 3\"X20           Prone quad set 3\"x20  3\"x20  3\"X20           Quad set  3\"X20  3\"x20  3\"x20           Biodex LOS   Foam Lv 10 x 5  Foam Lv 10 x6          SLB- foam   2x30\" ea  2x30\" ea                                     Ther Ex                          Pt Edu  KS KS KS          Recumbent bike for strength  8 min  8 min 10 min           Prone quad stretch 5x20\"  By PT  By PT           Prone knee hang stretch w towel roll   3 min                                                   Ther Activity                                       Gait Training                                       Modalities                                            "

## 2025-06-19 ENCOUNTER — OFFICE VISIT (OUTPATIENT)
Dept: PHYSICAL THERAPY | Facility: CLINIC | Age: 70
End: 2025-06-19
Attending: ORTHOPAEDIC SURGERY
Payer: MEDICARE

## 2025-06-19 DIAGNOSIS — G89.29 CHRONIC PAIN OF LEFT KNEE: Primary | ICD-10-CM

## 2025-06-19 DIAGNOSIS — M25.562 CHRONIC PAIN OF LEFT KNEE: Primary | ICD-10-CM

## 2025-06-19 PROCEDURE — 97110 THERAPEUTIC EXERCISES: CPT | Performed by: PHYSICAL THERAPIST

## 2025-06-19 PROCEDURE — 97112 NEUROMUSCULAR REEDUCATION: CPT | Performed by: PHYSICAL THERAPIST

## 2025-06-19 PROCEDURE — 97530 THERAPEUTIC ACTIVITIES: CPT | Performed by: PHYSICAL THERAPIST

## 2025-06-19 NOTE — PROGRESS NOTES
"Daily Note     Today's date: 2025  Patient name: Shiva Shabazz  : 1955  MRN: [id number]  Referring provider: Deep Murdock,*  Dx:   Encounter Diagnosis     ICD-10-CM    1. Chronic pain of left knee  M25.562     G89.29                      Subjective: Patient states she figured out she's getting more pain because she's not taking her calcium supplement.       Objective: See treatment diary below      Assessment: Tolerated treatment well. Patient exhibits some instances of knee pain or knee \"locking/giving out\" feeling.  This was during TKE into SLB and also when walking post session. Otherwise, she exhibits improved knee extension ROM and has no other complaint during session.  Patient would benefit from continued PT      Plan: Continue per plan of care.      Precautions: polymyalgia rheumatica     POC expires Unit limit Auth Expiration date PT/OT/ST + Visit Limit?   25  BOMN N/a BOMN                           Visit/Unit Tracking  AUTH Status:  Date             N/a Used 1 2 3 4            Remaining                  MEDBRIDGE PROVIDED TO PT.       Manuals           L knee extension mob  KS  KS KS                                                Neuro Re-Ed             TKE Grn 3\"x20  Grn 3\"x20  Grn 3\"X20  Grn 3\"x20 (into SLB x 7 only)          Prone quad set 3\"x20  3\"x20  3\"X20  3\"x20          Quad set  3\"X20  3\"x20  3\"x20  3\"x20          Biodex LOS   Foam Lv 10 x 5  Foam Lv 10 x6 Floor/foam x 4 ea Lv 10-9         SLB- foam   2x30\" ea  2x30\" ea                                     Ther Ex                          Pt Edu  KS KS KS KS         Recumbent bike for strength  8 min  8 min 10 min  10 min         Prone quad stretch 5x20\"  By PT  By PT  W strap 5x20\"          Prone knee hang stretch w towel roll   3 min            Pt Edu in aquatic exercises    KS                                   Ther Activity             Leg press    105# 2x10          SL leg " press    55# 2x10          Gait Training                                       Modalities

## 2025-06-23 ENCOUNTER — OFFICE VISIT (OUTPATIENT)
Dept: PHYSICAL THERAPY | Facility: CLINIC | Age: 70
End: 2025-06-23
Attending: ORTHOPAEDIC SURGERY
Payer: MEDICARE

## 2025-06-23 DIAGNOSIS — M25.562 CHRONIC PAIN OF LEFT KNEE: Primary | ICD-10-CM

## 2025-06-23 DIAGNOSIS — G89.29 CHRONIC PAIN OF LEFT KNEE: Primary | ICD-10-CM

## 2025-06-23 PROCEDURE — 97112 NEUROMUSCULAR REEDUCATION: CPT | Performed by: PHYSICAL THERAPIST

## 2025-06-23 PROCEDURE — 97110 THERAPEUTIC EXERCISES: CPT | Performed by: PHYSICAL THERAPIST

## 2025-06-23 NOTE — PROGRESS NOTES
"Daily Note     Today's date: 2025  Patient name: Shiva Shabazz  : 1955  MRN: [id number]  Referring provider: Deep Murdock,*  Dx:   Encounter Diagnosis     ICD-10-CM    1. Chronic pain of left knee  M25.562     G89.29                      Subjective: Patient states she figured out she's getting more pain because she's not taking her calcium supplement.       Objective: See treatment diary below      Assessment: Tolerated treatment well. Patient exhibits some instances of knee pain or knee \"locking/giving out\" feeling.  This was during TKE into SLB and also when walking post session. Otherwise, she exhibits improved knee extension ROM and has no other complaint during session.  Patient would benefit from continued PT      Plan: Continue per plan of care.      Precautions: polymyalgia rheumatica     POC expires Unit limit Auth Expiration date PT/OT/ST + Visit Limit?   25  BOMN N/a BOMN                           Visit/Unit Tracking  AUTH Status:  Date             N/a Used 1 2 3 4            Remaining                  MEDBRIDGE PROVIDED TO PT.       Manuals         L knee extension mob  KS  KS KS KS                                               Neuro Re-Ed             TKE Grn 3\"x20  Grn 3\"x20  Grn 3\"X20  Grn 3\"x20 (into SLB x 7 only)  Home         Prone quad set 3\"x20  3\"x20  3\"X20  3\"x20  3\"x20         Quad set  3\"X20  3\"x20  3\"x20  3\"x20          Biodex LOS   Foam Lv 10 x 5  Foam Lv 10 x6 Floor/foam x 4 ea Lv 10-9 Floor/foam Lv 10 x 3 ea         SLB- foam   2x30\" ea  2x30\" ea                                     Ther Ex                          Pt Edu  KS KS KS KS KS        Recumbent bike for strength  8 min  8 min 10 min  10 min 15 min        Prone quad stretch 5x20\"  By PT  By PT  W strap 5x20\"  By PT         Prone knee hang stretch w towel roll   3 min            Pt Edu in aquatic exercises    KS         Standing hip abd/ext     Grn 2x10 ea    "                   Ther Activity             Leg press    105# 2x10  105# 2x10         SL leg press    55# 2x10  55# 2x10         Gait Training                                       Modalities

## 2025-06-24 ENCOUNTER — OFFICE VISIT (OUTPATIENT)
Dept: FAMILY MEDICINE CLINIC | Facility: CLINIC | Age: 70
End: 2025-06-24
Payer: MEDICARE

## 2025-06-24 VITALS
SYSTOLIC BLOOD PRESSURE: 124 MMHG | OXYGEN SATURATION: 96 % | DIASTOLIC BLOOD PRESSURE: 78 MMHG | WEIGHT: 181 LBS | TEMPERATURE: 99.4 F | HEIGHT: 66 IN | BODY MASS INDEX: 29.09 KG/M2 | HEART RATE: 65 BPM

## 2025-06-24 DIAGNOSIS — L98.9 SKIN LESION OF BACK: ICD-10-CM

## 2025-06-24 DIAGNOSIS — K21.9 GASTROESOPHAGEAL REFLUX DISEASE WITHOUT ESOPHAGITIS: Primary | ICD-10-CM

## 2025-06-24 PROBLEM — J02.9 SORE THROAT: Status: RESOLVED | Noted: 2025-04-14 | Resolved: 2025-06-24

## 2025-06-24 PROBLEM — R09.81 NASAL CONGESTION: Status: RESOLVED | Noted: 2025-04-14 | Resolved: 2025-06-24

## 2025-06-24 PROBLEM — R09.89 CHEST CONGESTION: Status: RESOLVED | Noted: 2025-04-14 | Resolved: 2025-06-24

## 2025-06-24 PROBLEM — R68.83 CHILLS: Status: RESOLVED | Noted: 2025-04-14 | Resolved: 2025-06-24

## 2025-06-24 PROBLEM — R52 GENERALIZED BODY ACHES: Status: RESOLVED | Noted: 2025-04-14 | Resolved: 2025-06-24

## 2025-06-24 PROBLEM — Z01.818 PREOP EXAM FOR INTERNAL MEDICINE: Status: RESOLVED | Noted: 2025-01-21 | Resolved: 2025-06-24

## 2025-06-24 PROCEDURE — G2211 COMPLEX E/M VISIT ADD ON: HCPCS | Performed by: NURSE PRACTITIONER

## 2025-06-24 PROCEDURE — 99213 OFFICE O/P EST LOW 20 MIN: CPT | Performed by: NURSE PRACTITIONER

## 2025-06-24 RX ORDER — OMEPRAZOLE 40 MG/1
40 CAPSULE, DELAYED RELEASE ORAL DAILY
Qty: 30 CAPSULE | Refills: 1 | Status: SHIPPED | OUTPATIENT
Start: 2025-06-24 | End: 2025-07-24

## 2025-06-24 NOTE — ASSESSMENT & PLAN NOTE
Patient with nonhealing skin lesion on back concerns for possible SCC will refer to dermatology has a visit coming up in 12/2025

## 2025-06-24 NOTE — PROGRESS NOTES
"Name: Shiva Shabazz      : 1955      MRN: [id number]  Encounter Provider: VALARIE Howe  Encounter Date: 2025   Encounter department: [facility]  :  Assessment & Plan  Gastroesophageal reflux disease without esophagitis    Orders:    omeprazole (PriLOSEC) 40 MG capsule; Take 1 capsule (40 mg total) by mouth daily    Skin lesion of back  Patient with nonhealing skin lesion on back concerns for possible SCC will refer to dermatology has a visit coming up in 2025       Will try a PPI and f/u in 4 weeks       History of Present Illness   Patient here today and rpeorts that she is having a burning in her throat and having gerd symptoms and present for a while and taking TUMS and not helping. No issues with foods getting stuck and having the issues during the day       Review of Systems   Constitutional:  Negative for activity change, appetite change, chills, diaphoresis, fatigue, fever and unexpected weight change.   HENT:  Negative for congestion, ear pain, hearing loss, postnasal drip, sinus pressure, sinus pain, sneezing and sore throat.    Eyes:  Negative for pain, redness and visual disturbance.   Respiratory:  Negative for cough and shortness of breath.    Cardiovascular:  Negative for chest pain and leg swelling.   Gastrointestinal:  Negative for abdominal pain, diarrhea, nausea and vomiting.   Musculoskeletal:  Negative for arthralgias.   Neurological:  Negative for dizziness and light-headedness.   Psychiatric/Behavioral:  Negative for behavioral problems and dysphoric mood.        Objective   /78 (BP Location: Left arm, Patient Position: Sitting, Cuff Size: Large)   Pulse 65   Temp 99.4 °F (37.4 °C)   Ht 5' 6\" (1.676 m)   Wt 82.1 kg (181 lb)   LMP  (LMP Unknown)   SpO2 96%   BMI 29.21 kg/m²      Physical Exam  Constitutional:       General: She is not in acute distress.     Appearance: She is well-developed.   HENT:      Head: Normocephalic " and atraumatic.     Eyes:      Pupils: Pupils are equal, round, and reactive to light.     Neck:      Thyroid: No thyromegaly.     Cardiovascular:      Rate and Rhythm: Normal rate and regular rhythm.      Heart sounds: Normal heart sounds. No murmur heard.  Pulmonary:      Effort: Pulmonary effort is normal. No respiratory distress.      Breath sounds: Normal breath sounds. No wheezing.   Abdominal:      General: Bowel sounds are normal.      Palpations: Abdomen is soft.     Musculoskeletal:         General: Normal range of motion.      Cervical back: Normal range of motion.     Skin:     General: Skin is warm and dry.     Neurological:      Mental Status: She is alert and oriented to person, place, and time.

## 2025-06-26 ENCOUNTER — OFFICE VISIT (OUTPATIENT)
Dept: PHYSICAL THERAPY | Facility: CLINIC | Age: 70
End: 2025-06-26
Attending: ORTHOPAEDIC SURGERY
Payer: MEDICARE

## 2025-06-26 ENCOUNTER — APPOINTMENT (OUTPATIENT)
Dept: LAB | Facility: HOSPITAL | Age: 70
End: 2025-06-26
Attending: PHYSICIAN ASSISTANT
Payer: MEDICARE

## 2025-06-26 DIAGNOSIS — M15.0 PRIMARY OSTEOARTHRITIS INVOLVING MULTIPLE JOINTS: ICD-10-CM

## 2025-06-26 DIAGNOSIS — M06.00 SERONEGATIVE RHEUMATOID ARTHRITIS (HCC): ICD-10-CM

## 2025-06-26 DIAGNOSIS — Z79.899 HIGH RISK MEDICATION USE: ICD-10-CM

## 2025-06-26 DIAGNOSIS — M25.562 CHRONIC PAIN OF LEFT KNEE: Primary | ICD-10-CM

## 2025-06-26 DIAGNOSIS — G89.29 CHRONIC PAIN OF LEFT KNEE: Primary | ICD-10-CM

## 2025-06-26 LAB
ALBUMIN SERPL BCG-MCNC: 4.1 G/DL (ref 3.5–5)
ALP SERPL-CCNC: 56 U/L (ref 34–104)
ALT SERPL W P-5'-P-CCNC: 15 U/L (ref 7–52)
ANION GAP SERPL CALCULATED.3IONS-SCNC: 7 MMOL/L (ref 4–13)
AST SERPL W P-5'-P-CCNC: 17 U/L (ref 13–39)
BASOPHILS # BLD AUTO: 0.04 THOUSANDS/ÂΜL (ref 0–0.1)
BASOPHILS NFR BLD AUTO: 1 % (ref 0–1)
BILIRUB SERPL-MCNC: 0.77 MG/DL (ref 0.2–1)
BUN SERPL-MCNC: 27 MG/DL (ref 5–25)
CALCIUM SERPL-MCNC: 9.6 MG/DL (ref 8.4–10.2)
CHLORIDE SERPL-SCNC: 103 MMOL/L (ref 96–108)
CO2 SERPL-SCNC: 29 MMOL/L (ref 21–32)
CREAT SERPL-MCNC: 0.9 MG/DL (ref 0.6–1.3)
EOSINOPHIL # BLD AUTO: 0.2 THOUSAND/ÂΜL (ref 0–0.61)
EOSINOPHIL NFR BLD AUTO: 4 % (ref 0–6)
ERYTHROCYTE [DISTWIDTH] IN BLOOD BY AUTOMATED COUNT: 14.3 % (ref 11.6–15.1)
GFR SERPL CREATININE-BSD FRML MDRD: 65 ML/MIN/1.73SQ M
GLUCOSE P FAST SERPL-MCNC: 88 MG/DL (ref 65–99)
HCT VFR BLD AUTO: 34.8 % (ref 34.8–46.1)
HGB BLD-MCNC: 11.4 G/DL (ref 11.5–15.4)
IMM GRANULOCYTES # BLD AUTO: 0.01 THOUSAND/UL (ref 0–0.2)
IMM GRANULOCYTES NFR BLD AUTO: 0 % (ref 0–2)
LYMPHOCYTES # BLD AUTO: 1.08 THOUSANDS/ÂΜL (ref 0.6–4.47)
LYMPHOCYTES NFR BLD AUTO: 19 % (ref 14–44)
MCH RBC QN AUTO: 29.8 PG (ref 26.8–34.3)
MCHC RBC AUTO-ENTMCNC: 32.8 G/DL (ref 31.4–37.4)
MCV RBC AUTO: 91 FL (ref 82–98)
MONOCYTES # BLD AUTO: 0.62 THOUSAND/ÂΜL (ref 0.17–1.22)
MONOCYTES NFR BLD AUTO: 11 % (ref 4–12)
NEUTROPHILS # BLD AUTO: 3.72 THOUSANDS/ÂΜL (ref 1.85–7.62)
NEUTS SEG NFR BLD AUTO: 65 % (ref 43–75)
NRBC BLD AUTO-RTO: 0 /100 WBCS
PLATELET # BLD AUTO: 243 THOUSANDS/UL (ref 149–390)
PMV BLD AUTO: 11.6 FL (ref 8.9–12.7)
POTASSIUM SERPL-SCNC: 3.9 MMOL/L (ref 3.5–5.3)
PROT SERPL-MCNC: 7.2 G/DL (ref 6.4–8.4)
RBC # BLD AUTO: 3.83 MILLION/UL (ref 3.81–5.12)
SODIUM SERPL-SCNC: 139 MMOL/L (ref 135–147)
WBC # BLD AUTO: 5.67 THOUSAND/UL (ref 4.31–10.16)

## 2025-06-26 PROCEDURE — 97530 THERAPEUTIC ACTIVITIES: CPT

## 2025-06-26 PROCEDURE — 80053 COMPREHEN METABOLIC PANEL: CPT

## 2025-06-26 PROCEDURE — 97112 NEUROMUSCULAR REEDUCATION: CPT

## 2025-06-26 PROCEDURE — 36415 COLL VENOUS BLD VENIPUNCTURE: CPT

## 2025-06-26 PROCEDURE — 97110 THERAPEUTIC EXERCISES: CPT | Performed by: PHYSICAL THERAPIST

## 2025-06-26 PROCEDURE — 85025 COMPLETE CBC W/AUTO DIFF WBC: CPT
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
## 2025-07-23 NOTE — PROGRESS NOTES
Rheumatology Follow-up Visit  Name: Shiva Shabazz      : 1955      MRN: [id number]  Encounter Provider: Sherley Britton MD  Encounter Date: 2025   Encounter department: [facility]  :  Assessment & Plan  Seronegative rheumatoid arthritis (HCC)  70 year old female who presents for follow-up of seronegative rheumatoid arthritis. Her inflammatory arthritis has remained well controlled on her current therapy of methotrexate 15mg weekly. We discussed trial of decrease to 12.5mg weekly and patient agreeable. Monitoring labs up to date. Continue folic acid daily and lab monitoring Q3 months. Follow-up in 6 months.  Orders:    methotrexate 2.5 MG tablet; Take 6 tablets (15 mg total) by mouth once a week    High risk medication use    Orders:    methotrexate 2.5 MG tablet; Take 6 tablets (15 mg total) by mouth once a week        History of Present Illness   HPI  Shiva Shabazz is a 70 y.o. female who presents for follow-up.    Interval History:  Patient reports right knee replacement surgery went very well.  It is feeling significantly better.  Unfortunately her left knee has been causing her more problems. She has seen Ortho and Plastic Surgery and unfortunately due to her prior hx of skin grafts surgery may not be offered.    Permanent History:  Patient was diagnosed with seronegative rheumatoid arthritis by outside provider.  Established with [facility] in 2021.  Workup showed negative RF, CCP and x-rays of the hands and shoulders were most consistent with osteoarthritis with right calcific tendinitis.  Ultrasound of the hands showed synovial proliferation at the MCPs without synovitis or erosions.  There was a concern for possible PMR given distribution of symptoms and elevated inflammatory markers.  Ultimately diagnosed with seronegative rheumatoid arthritis and tried on hydroxychloroquine and sulfasalazine without improvement.  Then started on methotrexate in  "2023 with improvement in symptoms.    Review of Systems  Complete ROS conducted as per HPI. In addition, denies:  Fever  Photosensitive rash  Sicca symptoms  Recurrent oral ulcers  Muscle weakness  Uveitis  Dactylitis  Chest pain  SOB  Pleurisy  Gross hematuria  Foamy urine  Raynaud's  Joint issues other than noted above    Objective   /86   Pulse 63   Temp (!) 97.4 °F (36.3 °C) (Tympanic)   Ht 5' 6\" (1.676 m)   Wt 79.4 kg (175 lb)   LMP  (LMP Unknown)   SpO2 96%   BMI 28.25 kg/m²      Physical Exam  Physical Exam  Constitutional: well appearing, no acute distress  HEENT: normocephalic, sclera clear, no visible oral or nasal ulcers  Neck: supple, no palpable cervical adenopathy  CV: regular rate and rhythm, no murmur  Pulm: normal respiratory effort, lungs clear to auscultation b/l  Skin: no rashes, no skin thickening  Extremities: warm and well perfused, edema left lower extremity, evidence of prior skin grafting and surgeries of the lower extremities  MSK: Chronic OA changes bilateral hands, no synovitis or effusions, R knee TKR    Labs and Imaging  I have personally reviewed pertinent labs and imaging.   "

## 2025-07-24 ENCOUNTER — OFFICE VISIT (OUTPATIENT)
Dept: FAMILY MEDICINE CLINIC | Facility: CLINIC | Age: 70
End: 2025-07-24
Payer: MEDICARE

## 2025-07-24 VITALS
HEART RATE: 76 BPM | BODY MASS INDEX: 28.64 KG/M2 | TEMPERATURE: 97.8 F | DIASTOLIC BLOOD PRESSURE: 82 MMHG | HEIGHT: 66 IN | OXYGEN SATURATION: 97 % | WEIGHT: 178.2 LBS | SYSTOLIC BLOOD PRESSURE: 128 MMHG

## 2025-07-24 DIAGNOSIS — K21.9 GASTROESOPHAGEAL REFLUX DISEASE WITHOUT ESOPHAGITIS: Primary | ICD-10-CM

## 2025-07-24 PROCEDURE — G2211 COMPLEX E/M VISIT ADD ON: HCPCS | Performed by: NURSE PRACTITIONER

## 2025-07-24 PROCEDURE — 99213 OFFICE O/P EST LOW 20 MIN: CPT | Performed by: NURSE PRACTITIONER

## 2025-07-24 RX ORDER — OMEPRAZOLE 20 MG/1
20 CAPSULE, DELAYED RELEASE ORAL DAILY
Qty: 30 CAPSULE | Refills: 1 | Status: SHIPPED | OUTPATIENT
Start: 2025-07-24 | End: 2025-08-23

## 2025-07-24 NOTE — ASSESSMENT & PLAN NOTE
Patient has improved with the Prilosec 40 mg and will decrease to 20 mg daily     Orders:    omeprazole (PriLOSEC) 20 mg delayed release capsule; Take 1 capsule (20 mg total) by mouth daily

## 2025-07-24 NOTE — PROGRESS NOTES
"Name: Shiva Shabazz      : 1955      MRN: [id number]  Encounter Provider: VALARIE Howe  Encounter Date: 2025   Encounter department: [facility]  :  Assessment & Plan  Gastroesophageal reflux disease without esophagitis  Patient has improved with the Prilosec 40 mg and will decrease to 20 mg daily     Orders:    omeprazole (PriLOSEC) 20 mg delayed release capsule; Take 1 capsule (20 mg total) by mouth daily           History of Present Illness   Patient here today for recheck on her cough/GERD and reports that she is taking the Prilosec 40 mg and has completed the month of the medication and is feeling better and would like to decrease to 20 mg       Review of Systems   Constitutional:  Negative for activity change, appetite change, chills, diaphoresis, fatigue, fever and unexpected weight change.   HENT:  Negative for congestion, ear pain, hearing loss, postnasal drip, sinus pressure, sinus pain, sneezing and sore throat.    Eyes:  Negative for pain, redness and visual disturbance.   Respiratory:  Negative for cough and shortness of breath.    Cardiovascular:  Negative for chest pain and leg swelling.   Gastrointestinal:  Negative for abdominal pain, diarrhea, nausea and vomiting.   Musculoskeletal:  Negative for arthralgias.   Neurological:  Negative for dizziness and light-headedness.   Psychiatric/Behavioral:  Negative for behavioral problems and dysphoric mood.        Objective   /82 (BP Location: Right arm, Patient Position: Sitting)   Pulse 76   Temp 97.8 °F (36.6 °C) (Temporal)   Ht 5' 6\" (1.676 m)   Wt 80.8 kg (178 lb 3.2 oz)   LMP  (LMP Unknown)   SpO2 97%   BMI 28.76 kg/m²      Physical Exam  Constitutional:       General: She is not in acute distress.     Appearance: She is well-developed.   HENT:      Head: Normocephalic and atraumatic.     Eyes:      Pupils: Pupils are equal, round, and reactive to light.     Neck:      Thyroid: No " thyromegaly.     Cardiovascular:      Rate and Rhythm: Normal rate and regular rhythm.      Heart sounds: Normal heart sounds. No murmur heard.  Pulmonary:      Effort: Pulmonary effort is normal. No respiratory distress.      Breath sounds: Normal breath sounds. No wheezing.   Abdominal:      General: Bowel sounds are normal.      Palpations: Abdomen is soft.     Musculoskeletal:         General: Normal range of motion.      Cervical back: Normal range of motion.     Skin:     General: Skin is warm and dry.     Neurological:      Mental Status: She is alert and oriented to person, place, and time.

## 2025-07-25 ENCOUNTER — OFFICE VISIT (OUTPATIENT)
Dept: OBGYN CLINIC | Facility: MEDICAL CENTER | Age: 70
End: 2025-07-25
Payer: MEDICARE

## 2025-07-25 ENCOUNTER — APPOINTMENT (OUTPATIENT)
Dept: RADIOLOGY | Facility: MEDICAL CENTER | Age: 70
End: 2025-07-25
Attending: ORTHOPAEDIC SURGERY
Payer: MEDICARE

## 2025-07-25 ENCOUNTER — OFFICE VISIT (OUTPATIENT)
Dept: RHEUMATOLOGY | Facility: CLINIC | Age: 70
End: 2025-07-25
Payer: MEDICARE

## 2025-07-25 VITALS
SYSTOLIC BLOOD PRESSURE: 130 MMHG | TEMPERATURE: 97.4 F | WEIGHT: 175 LBS | OXYGEN SATURATION: 96 % | HEART RATE: 63 BPM | BODY MASS INDEX: 28.12 KG/M2 | DIASTOLIC BLOOD PRESSURE: 86 MMHG | HEIGHT: 66 IN

## 2025-07-25 DIAGNOSIS — M06.00 SERONEGATIVE RHEUMATOID ARTHRITIS (HCC): Primary | ICD-10-CM

## 2025-07-25 DIAGNOSIS — M17.12 PRIMARY OSTEOARTHRITIS OF LEFT KNEE: Primary | ICD-10-CM

## 2025-07-25 DIAGNOSIS — Z79.899 HIGH RISK MEDICATION USE: ICD-10-CM

## 2025-07-25 DIAGNOSIS — M25.562 LEFT KNEE PAIN, UNSPECIFIED CHRONICITY: ICD-10-CM

## 2025-07-25 PROCEDURE — G2211 COMPLEX E/M VISIT ADD ON: HCPCS | Performed by: STUDENT IN AN ORGANIZED HEALTH CARE EDUCATION/TRAINING PROGRAM

## 2025-07-25 PROCEDURE — 99214 OFFICE O/P EST MOD 30 MIN: CPT | Performed by: STUDENT IN AN ORGANIZED HEALTH CARE EDUCATION/TRAINING PROGRAM

## 2025-07-25 PROCEDURE — 73560 X-RAY EXAM OF KNEE 1 OR 2: CPT

## 2025-07-25 PROCEDURE — 99213 OFFICE O/P EST LOW 20 MIN: CPT | Performed by: ORTHOPAEDIC SURGERY

## 2025-07-25 RX ORDER — METHOTREXATE 2.5 MG/1
15 TABLET ORAL WEEKLY
Qty: 72 TABLET | Refills: 3 | Status: SHIPPED | OUTPATIENT
Start: 2025-07-25 | End: 2025-10-23
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
## 2025-08-20 DIAGNOSIS — I10 ESSENTIAL HYPERTENSION: ICD-10-CM

## 2025-08-21 RX ORDER — HYDROCHLOROTHIAZIDE 25 MG/1
25 TABLET ORAL DAILY
Qty: 90 TABLET | Refills: 1 | Status: SHIPPED | OUTPATIENT
Start: 2025-08-21

## (undated) DEVICE — VELYS BLADE SAW OSC 85 X 19 X 2MM

## (undated) DEVICE — ABDOMINAL PAD: Brand: DERMACEA

## (undated) DEVICE — TRAY FOLEY 16FR URIMETER SILICONE SURESTEP

## (undated) DEVICE — WET SKIN PREP TRAY: Brand: MEDLINE INDUSTRIES, INC.

## (undated) DEVICE — BAG POLY CLEAR 12 X 8 X 30

## (undated) DEVICE — BLADE SAW RECIP 12.5 X 76MM 0.9/0.9MM THCK

## (undated) DEVICE — GLOVE INDICATOR PI UNDERGLOVE SZ 7 BLUE

## (undated) DEVICE — GLOVE INDICATOR PI UNDERGLOVE SZ 7.5 BLUE

## (undated) DEVICE — STOCKINETTE REGULAR

## (undated) DEVICE — SUT VICRYL PLUS 2-0 CTB-1 27 IN VCPB259H

## (undated) DEVICE — STIRRUP STRAP ADULT DISP

## (undated) DEVICE — DRAPE EQUIPMENT RF WAND

## (undated) DEVICE — PAD CAST 4 IN COTTON NON STERILE

## (undated) DEVICE — GLOVE SRG BIOGEL 6.5

## (undated) DEVICE — JP 3-SPRING RES W/10FR PVC DRAIN/TR: Brand: CARDINAL HEALTH

## (undated) DEVICE — VELYS ROBOTIC-ASSISTED SOLUTION ARRAY SET - KNEE: Brand: VELYS

## (undated) DEVICE — VELYS ROBOT-ASSISTED SOLUTION ARRAY DRILL PIN 125 MM X 4 MM: Brand: VELYS

## (undated) DEVICE — VELYS ROBOT DRAPE

## (undated) DEVICE — VELYS SATEL DRAPE

## (undated) DEVICE — 3M™ IOBAN™ 2 ANTIMICROBIAL INCISE DRAPE 6650EZ: Brand: IOBAN™ 2

## (undated) DEVICE — HOOD: Brand: T7PLUS

## (undated) DEVICE — PADDING CAST 4 IN  COTTON STRL

## (undated) DEVICE — ACE WRAP 6 IN UNSTERILE

## (undated) DEVICE — ACE WRAP 6 IN STERILE

## (undated) DEVICE — CAPIT KNEE ATTUNE FB W/DOM AOX

## (undated) DEVICE — GLOVE INDICATOR PI UNDERGLOVE SZ 8.5 BLUE

## (undated) DEVICE — SILVER-COATED ANTIMICROBIAL BARRIER DRESSING: Brand: ACTICOAT   4" X 8"

## (undated) DEVICE — BETHLEHEM UNIV TOTAL KNEE, KIT: Brand: CARDINAL HEALTH

## (undated) DEVICE — PAD CAST 6 IN COTTON NON STERILE

## (undated) DEVICE — HANDPIECE SET WITH RETRACTABLE COAXIAL FAN SPRAY TIP AND SUCTION TUBE: Brand: INTERPULSE

## (undated) DEVICE — GLOVE SRG BIOGEL 7.5

## (undated) DEVICE — SUT VICRYL PLUS 1 CTB-1 36 IN VCPB947H

## (undated) DEVICE — 3 BONE CEMENT MIXER: Brand: MIXEVAC

## (undated) DEVICE — ASTOUND STANDARD SURGICAL GOWN, XL: Brand: CONVERTORS

## (undated) DEVICE — SAW BLADE OSCILLATING BRAZOL 167

## (undated) DEVICE — PLUMEPEN PRO 10FT

## (undated) DEVICE — GLOVE SRG BIOGEL 8

## (undated) DEVICE — DRESSING EXUDERM SATIN HYDROCOLLOID 4 X 4 IN

## (undated) DEVICE — HOOD WITH PEEL AWAY FACE SHIELD: Brand: T7PLUS

## (undated) DEVICE — NO-SCRATCH ™ SMALL WHITNEY CURETTE ™ IS A SINGLE-USE, PLASTIC CURETTE FOR QUICKLY APPLYING, MANIPULATING AND REMOVING BONE CEMENT DURING HIP AND KNEE REPLACEMENT SURGERY. THE PLASTIC IS SOFTER THAN STEEL INSTRUMENTS, REDUCING THE RISK OF DAMAGING THE PROSTHESIS WITH METAL INSTRUMENTS.  THE CURETTE’S 6MM TIP REMOVES EXCESS CEMENT FROM REPLACEMENT HIPS AND KNEES. EASY-TO-MANEUVER, THE SMALL BLUE CURETTE LETS YOU REMOVE CEMENT FROM ALL EDGES OF THE PROSTHESIS.NO-SCRATCH WHITNEY SMALL CURETTE FEATURES:SAFER THAN STEEL- MADE OF PLASTIC - STURDY YET SOFTER THAN SURGICAL STEEL.HANDIER- EACH TOOL HAS A MOLDED-IN THUMB INDENTATION INSTANTLY ORIENTING THE TOOL.- EASIER TO MANEUVER IN HARD TO SEE PLACES.- COLOR-CODED FOR EASY IDENTIFICATION.FASTER- COMES INDIVIDUALLY PACKAGED IN STERILE, PEEL OPEN POUCH, READY TO GO.- APPLIES, MANIPULATES, OR REMOVES CEMENT WITH FINGERTIP PRECISION.ECONOMICAL- THE COST OF A SINGLE REVISION DWARFS THE COST OF A SINGLE-USE CURETTE. - DISPOSABLE – THERE’S NO NEED TO WASTE TIME REMOVING HARDENED CEMENT OR RE-STERILIZING TOOLS.- LESS EXPENSIVE TO BUY AND INVENTORY - ORDER ONLY THE TOOL YOU USE.- PACKAGED 25 INDIVIDUALLY WRAPPED TOOLS TO A CARTON FOR CONVENIENT SHELF STORAGE.: Brand: WHITNEY NO-SCRATCH CURETTE (SMALL)